# Patient Record
Sex: FEMALE | HISPANIC OR LATINO | Employment: FULL TIME | URBAN - METROPOLITAN AREA
[De-identification: names, ages, dates, MRNs, and addresses within clinical notes are randomized per-mention and may not be internally consistent; named-entity substitution may affect disease eponyms.]

---

## 2017-10-20 ENCOUNTER — TELEPHONE (OUTPATIENT)
Dept: MEDICAL GROUP | Age: 62
End: 2017-10-20

## 2017-10-20 NOTE — TELEPHONE ENCOUNTER
ESTABLISHED PATIENT PRE-VISIT PLANNING     Note: Patient will not be contacted if there is no indication to call.     1.  Reviewed notes from the last few office visits within the medical group: Yes    2.  If any orders were placed at last visit or intended to be done for this visit (i.e. 6 mos follow-up), do we have Results/Consult Notes?        •  Labs - patient coming in for HB   Note: If patient appointment is for lab review and patient did not complete labs,                check with provider if OK to reschedule patient until labs completed.       •  Imaging - Imaging was not ordered at last office visit.       •  Referrals - No referrals were ordered at last office visit.    3. Is this appointment scheduled as a Hospital Follow-Up? No    4.  Immunizations were updated in Epic using WebIZ?: Epic matches WebIZ       •  Web Iz Recommendations: FLU, TD and ZOSTAVAX (Shingles)    5.  Patient is due for the following Health Maintenance Topics:   Health Maintenance Due   Topic Date Due   • IMM ZOSTER VACCINE  11/10/2016   • MAMMOGRAM  03/08/2017   • PAP SMEAR  07/13/2017   • IMM INFLUENZA (1) 09/01/2017           6.  Patient was NOT informed to arrive 15 min prior to their scheduled appointment and bring in their medication bottles.

## 2017-10-21 NOTE — ASSESSMENT & PLAN NOTE
She stopped simvastatin 10 mg. Simvastatin was started on 12/9/16. But she did not take it as instructed due to lack of follow-up and lack of insurance. [facility] on 10[facility]14/16 showed that total cholesterol 239, triglycerides 182, , HDL 57, non-HDL cholesterol 182. Last lipid panel on 10/14/16 refluxed the level of cholesterol without statin treatment.    Follow-up blood tests were ordered on 12/9/16 but patient has not done yet.  Patient agreed to take simvastatin daily.

## 2017-10-21 NOTE — ASSESSMENT & PLAN NOTE
Patient is taking atenolol 25 mg daily. Her blood pressure is not well controlled with current regimen. Patient was told by her pharmacy that atenolol is no longer available. She agrees to switch to alternate medicine, metoprolol. We discussed to take metoprolol XL 25 mg daily.

## 2017-10-21 NOTE — ASSESSMENT & PLAN NOTE
Patient had right breast biopsy on 4/1/16 showed benign rupture cyst with calcification without malignancy or atypical cells. Radiologist recommended to repeat diagnostic mammogram on right breast in 6 months for follow-up. I ordered a diagnostic mammogram of right breast for her, but she has not done yet. I reprinted mammogram ordered for her and strongly advised her to repeat mammogram for close observation.  I also reviewed her previous mammogram and biopsy report with her.

## 2017-10-23 ENCOUNTER — OFFICE VISIT (OUTPATIENT)
Dept: MEDICAL GROUP | Age: 62
End: 2017-10-23

## 2017-10-23 VITALS
HEIGHT: 63 IN | DIASTOLIC BLOOD PRESSURE: 72 MMHG | TEMPERATURE: 97.5 F | HEART RATE: 70 BPM | SYSTOLIC BLOOD PRESSURE: 128 MMHG | OXYGEN SATURATION: 98 % | WEIGHT: 161.4 LBS | BODY MASS INDEX: 28.6 KG/M2

## 2017-10-23 DIAGNOSIS — I10 ESSENTIAL HYPERTENSION: ICD-10-CM

## 2017-10-23 DIAGNOSIS — R92.0 MAMMOGRAPHIC MICROCALCIFICATION: ICD-10-CM

## 2017-10-23 DIAGNOSIS — E78.5 DYSLIPIDEMIA: ICD-10-CM

## 2017-10-23 PROCEDURE — 99214 OFFICE O/P EST MOD 30 MIN: CPT | Performed by: INTERNAL MEDICINE

## 2017-10-23 RX ORDER — SIMVASTATIN 10 MG
10 TABLET ORAL EVERY EVENING
Qty: 90 TAB | Refills: 3 | Status: SHIPPED | OUTPATIENT
Start: 2017-10-23 | End: 2018-06-21

## 2017-10-23 RX ORDER — METOPROLOL SUCCINATE 25 MG/1
25 TABLET, EXTENDED RELEASE ORAL DAILY
Qty: 90 TAB | Refills: 3 | Status: SHIPPED | OUTPATIENT
Start: 2017-10-23 | End: 2018-09-26 | Stop reason: SDUPTHER

## 2017-10-23 ASSESSMENT — PAIN SCALES - GENERAL: PAINLEVEL: 1=MINIMAL PAIN

## 2017-10-23 NOTE — LETTER
[facility]  Janet Sheffield M.D.  [address]  [facility] [address]  Fax: [phone]   Authorization for Release/Disclosure of   Protected Health Information   Name: NILA AJ : 1955 SSN: [id number]   Address: [address] [place] [address] Phone:    [phone] (home)    I authorize the entity listed below to release/disclose the PHI below to:   [facility]/Janet Sheffield M.D. and Janet Sheffield M.D.   Provider or Entity Name:  [facility]   Address   City, State, [facility]   Phone:      Fax:     Reason for request: continuity of care   Information to be released:    [  ] LAST COLONOSCOPY,  including any PATH REPORT and follow-up  [  ] LAST FIT/COLOGUARD RESULT [  ] LAST DEXA  [  ] LAST MAMMOGRAM  [xxxx  ] LAST PAP  [  ] LAST LABS [  ] RETINA EXAM REPORT  [  ] IMMUNIZATION RECORDS  [  ] Release all info      [  ] Check here and initial the line next to each item to release ALL health information INCLUDING  _____ Care and treatment for drug and / or alcohol abuse  _____ HIV testing, infection status, or AIDS  _____ Genetic Testing    DATES OF SERVICE OR TIME PERIOD TO BE DISCLOSED: _____________  I understand and acknowledge that:  * This Authorization may be revoked at any time by you in writing, except if your health information has already been used or disclosed.  * Your health information that will be used or disclosed as a result of you signing this authorization could be re-disclosed by the recipient. If this occurs, your re-disclosed health information may no longer be protected by State or Federal laws.  * You may refuse to sign this Authorization. Your refusal will not affect your ability to obtain treatment.  * This Authorization becomes effective upon signing and will  on (date) __________.      If no date is indicated, this Authorization will  one (1) year from the signature date.    Name: Nila Aj    Signature:   Date:     10/23/2017            PLEASE FAX REQUESTED RECORDS BACK TO: [phone]

## 2017-10-23 NOTE — PROGRESS NOTES
Subjective:   Nila Quispe is a 61 y.o. female here today for evaluation and management of:    Patient speaks Kyrgyz only. This encounter is translated by her daughter.    Mammographic microcalcification on right breast and biopsy on 4/1/16 ruled out malignancy   Patient had right breast biopsy on 4/1/16 showed benign rupture cyst with calcification without malignancy or atypical cells. Radiologist recommended to repeat diagnostic mammogram on right breast in 6 months for follow-up. I ordered a diagnostic mammogram of right breast for her, but she has not done yet. I reprinted mammogram ordered for her and strongly advised her to repeat mammogram for close observation.  I also reviewed her previous mammogram and biopsy report with her.    Essential hypertension  Patient is taking atenolol 25 mg daily. Her blood pressure is not well controlled with current regimen. Patient was told by her pharmacy that atenolol is no longer available. She agrees to switch to alternate medicine, metoprolol. We discussed to take metoprolol XL 25 mg daily.    Dyslipidemia  She stopped simvastatin 10 mg. Simvastatin was started on 12/9/16. But she did not take it as instructed due to lack of follow-up and lack of insurance. [facility]16 showed that total cholesterol 239, triglycerides 182, , HDL 57, non-HDL cholesterol 182. Last lipid panel on 10/14/16 refluxed the level of cholesterol without statin treatment.    Follow-up blood tests were ordered on 12/9/16 but patient has not done yet.  Patient agreed to take simvastatin daily.         Current medicines (including changes today)  Current Outpatient Prescriptions   Medication Sig Dispense Refill   • metoprolol SR (TOPROL XL) 25 MG TABLET SR 24 HR Take 1 Tab by mouth every day. 90 Tab 3   • simvastatin (ZOCOR) 10 MG Tab Take 1 Tab by mouth every evening. 90 Tab 3     No current facility-administered medications for this visit.   "    She  has a past medical history of Hyperlipidemia and Hypertension.    ROS   No chest pain, no shortness of breath, no abdominal pain       Objective:     Blood pressure 128/72, pulse 70, temperature 36.4 °C (97.5 °F), height 1.598 m (5' 2.9\"), weight 73.2 kg (161 lb 6.4 oz), SpO2 98 %, not currently breastfeeding. Body mass index is 28.68 kg/m².   Physical Exam:  General: Alert, oriented and no acute distress.  Eye contact is good, speech goal directed, affect calm  HEENT: conjunctiva non-injected, sclera non-icteric.  Oral mucous membranes pink and moist with no lesions.  Pinna normal.   Lungs: Normal respiratory effort, clear to auscultation bilaterally with good excursion.  CV: regular rate and rhythm. No murmurs.   Abdomen: soft, non distended, nontender, Bowel sound normal.  Ext: no edema, color normal, vascularity normal, temperature normal      Assessment and Plan:   The following treatment plan was discussed     1. Mammographic microcalcification on right breast and biopsy on 4/1/16 ruled out malignancy   - Review previous mammogram report and biopsy report with patient and daughter. Reprinted right breast diagnostic mammogram to her.   - Counseling for importance of follow-up with mammogram.     2. Essential hypertension  - Well-controlled. Continue current regimens. Recheck lab 1-2 weeks before next follow up visit.  - Switched atenolol to metoprolol due to shortage of atenolol.  - Recommend to monitor blood pressure and heart rate at home.   - metoprolol SR (TOPROL XL) 25 MG TABLET SR 24 HR; Take 1 Tab by mouth every day.  Dispense: 90 Tab; Refill: 3  - CBC WITH DIFFERENTIAL; Future  - COMP METABOLIC PANEL; Future    3. Dyslipidemia  - Restart simvastatin 10 mg every evening. Review potential side effects of simvastatin with her.  - Advised to eat low fat, low carbohydrate and high fiber diet as well as do cardio physical exercise regularly.   - simvastatin (ZOCOR) 10 MG Tab; Take 1 Tab by mouth " every evening.  Dispense: 90 Tab; Refill: 3  - COMP METABOLIC PANEL; Future  - LIPID PROFILE; Future    4. Health Maintenance   - Patient stated that she had Pap smear in [facility] clinic 2 years ago and a report was normal. We will request the report for Pap smear. Patient will schedule for MA visit for Flu vaccine.     Followup: Return in about 3 months (around 1/23/2018), or if symptoms worsen or fail to improve, for hypertension, hyperlipidemia, lab review.      Please note that this dictation was created using voice recognition software. I have made every reasonable attempt to correct obvious errors, but I expect that there may have unintended errors in text, spelling, punctuation, or grammar that I did not discover.

## 2018-06-21 ENCOUNTER — OFFICE VISIT (OUTPATIENT)
Dept: MEDICAL GROUP | Age: 63
End: 2018-06-21
Payer: COMMERCIAL

## 2018-06-21 ENCOUNTER — HOSPITAL ENCOUNTER (OUTPATIENT)
Facility: MEDICAL CENTER | Age: 63
End: 2018-06-21
Attending: INTERNAL MEDICINE
Payer: COMMERCIAL

## 2018-06-21 VITALS
SYSTOLIC BLOOD PRESSURE: 122 MMHG | WEIGHT: 167 LBS | TEMPERATURE: 97.4 F | HEIGHT: 62 IN | BODY MASS INDEX: 30.73 KG/M2 | OXYGEN SATURATION: 98 % | DIASTOLIC BLOOD PRESSURE: 68 MMHG | HEART RATE: 61 BPM

## 2018-06-21 DIAGNOSIS — E66.9 OBESITY (BMI 30.0-34.9): ICD-10-CM

## 2018-06-21 DIAGNOSIS — E78.5 DYSLIPIDEMIA: ICD-10-CM

## 2018-06-21 DIAGNOSIS — Z12.31 VISIT FOR SCREENING MAMMOGRAM: ICD-10-CM

## 2018-06-21 DIAGNOSIS — Z01.419 ENCOUNTER FOR GYNECOLOGICAL EXAMINATION: ICD-10-CM

## 2018-06-21 DIAGNOSIS — Z12.4 SCREENING FOR CERVICAL CANCER: ICD-10-CM

## 2018-06-21 DIAGNOSIS — R10.2 PELVIC PAIN IN FEMALE: ICD-10-CM

## 2018-06-21 DIAGNOSIS — I10 ESSENTIAL HYPERTENSION: ICD-10-CM

## 2018-06-21 PROCEDURE — 99396 PREV VISIT EST AGE 40-64: CPT | Mod: 25 | Performed by: INTERNAL MEDICINE

## 2018-06-21 PROCEDURE — 99214 OFFICE O/P EST MOD 30 MIN: CPT | Performed by: INTERNAL MEDICINE

## 2018-06-21 PROCEDURE — 88175 CYTOPATH C/V AUTO FLUID REDO: CPT

## 2018-06-21 PROCEDURE — 87624 HPV HI-RISK TYP POOLED RSLT: CPT

## 2018-06-21 RX ORDER — LOVASTATIN 10 MG/1
10 TABLET ORAL EVERY EVENING
Qty: 90 TAB | Refills: 3 | Status: SHIPPED | OUTPATIENT
Start: 2018-06-21 | End: 2018-09-26 | Stop reason: CLARIF

## 2018-06-21 ASSESSMENT — PATIENT HEALTH QUESTIONNAIRE - PHQ9: CLINICAL INTERPRETATION OF PHQ2 SCORE: 0

## 2018-06-21 NOTE — ASSESSMENT & PLAN NOTE
Patient reported that she stopped taking simvastatin more than 6 months as she had rash from taking simvastatin.  Her cholesterol is not well controlled yet.  Patient agreed to try different statin.  She will try lovastatin 10 mg 1 tablet every evening.  I reviewed the risks and benefits of lovastatin with patient.

## 2018-06-21 NOTE — ASSESSMENT & PLAN NOTE
Patient reported that she has pain on left pelvic for 4 months.  Her symptoms is intermittent. She did not notice any aggravating factor or relieving factor.  Patient reported  that her mom  from cervical cancer at age 56.  Patient had 5 pregnancies in the past and she had 1 miscarriage,  3 C sections and one normal vaginal delivery.  Patient denied stress incontinence.

## 2018-06-21 NOTE — ASSESSMENT & PLAN NOTE
Patient states that she is taking metoprolol XL 25 mg daily.  Her blood pressure is stable and well controlled with current regimen.  She has been taking beta-blocker for many years without complication or side effects, so I am not going to switch to other antihypertensive medication.

## 2018-06-22 DIAGNOSIS — Z12.4 SCREENING FOR CERVICAL CANCER: ICD-10-CM

## 2018-06-22 NOTE — PROGRESS NOTES
SUBJECTIVE: 62 y.o. female for annual routine gynecologic exam  Chief Complaint   Patient presents with   • Gynecologic Exam     PAP   • Pelvic Pain     Left pelvic for 4 months   • Hyperlipidemia   • Hypertension       Obstetric History     No data available      Last Pap: 16  History   Sexual Activity   • Sexual activity: Yes   • Partners: Male   • Birth control/ protection: Post-Menopausal     Sexual history: previously sexually active but currently abstaining   H/O Abnormal Pap: No but has HPV positive in previous Pap on 16  She  reports that she has never smoked. She has never used smokeless tobacco.     HPI:    Pelvic pain in female  Patient reported that she has pain on left pelvic for 4 months.  Her symptoms is intermittent. She did not notice any aggravating factor or relieving factor.  Patient reported  that her mom  from cervical cancer at age 56.  Patient had 5 pregnancies in the past and she had 1 miscarriage,  3 C sections and one normal vaginal delivery.  Patient denied stress incontinence.    Dyslipidemia  Patient reported that she stopped taking simvastatin more than 6 months as she had rash from taking simvastatin.  Her cholesterol is not well controlled yet.  Patient agreed to try different statin.  She will try lovastatin 10 mg 1 tablet every evening.  I reviewed the risks and benefits of lovastatin with patient.    Essential hypertension  Patient states that she is taking metoprolol XL 25 mg daily.  Her blood pressure is stable and well controlled with current regimen.  She has been taking beta-blocker for many years without complication or side effects, so I am not going to switch to other antihypertensive medication.        LMP Date:  (none)   Allergies: Penicillins     ROS:    Reports no menopause symptoms of hot flashes, night sweats, sleep disruption, mood changes.Denies vaginal dryness.   No significant bloating/fluid retention, pelvic pain, or dyspareunia. No vaginal  "discharge   No breast tenderness, mass, nipple discharge, changes in size or contour, or abnormal cyclic discomfort.  No urinary tract symptoms, no incontinence.   No abdominal pain, change in bowel habits, black or bloody stools.    No unusual headaches, no visual changes, menstrual migraines   No prolonged cough. No dyspnea or chest pain on exertion.  No depression, labile mood, anxiety, libido changes, insomnia.  No polydipsia, polyuria, temperature intolerance.  No new/concerning skin lesions, concerns.     Exercise: no regular exercise program  Preventive Care: Counseling for breast cancer screening with mammogram.    Current medicines (including changes today)  Current Outpatient Prescriptions   Medication Sig Dispense Refill   • lovastatin (MEVACOR) 10 MG tablet Take 1 Tab by mouth every evening. 90 Tab 3   • metoprolol SR (TOPROL XL) 25 MG TABLET SR 24 HR Take 1 Tab by mouth every day. 90 Tab 3     No current facility-administered medications for this visit.      She  has a past medical history of Hyperlipidemia and Hypertension.  She  has a past surgical history that includes breast biopsy; us-needle core bx-breast panel (Left); cholecystectomy (2009); and primary c section.     Family History:   Family History   Problem Relation Age of Onset   • Cancer Mother      Cervical Cancer   • Heart Attack Father           OBJECTIVE:   /68   Pulse 61   Temp 36.3 °C (97.4 °F)   Ht 1.57 m (5' 1.8\")   Wt 75.8 kg (167 lb)   SpO2 98%   BMI 30.74 kg/m²   Body mass index is 30.74 kg/m².    HEAD AND NECK:  Ears normal.  Throat, oral cavity and tongue normal.  Neck supple. No adenopathy or masses in the neck or supraclavicular regions.  No carotid bruits. No thyromegaly. NEURO: Cranial nerves are normal. DTR's normal and symmetric.  CHEST:  Clear, good air entry, no wheezes or rales. HEART:  Regular rate and rhythm.  S1 and S2 normal. No edema or JVD. ABDOMEN:  Soft without tenderness, guarding, mass or " organomegaly.  No CVA tenderness or inguinal adenopathy. EXTREMITIES:  Extremities, reflexes and peripheral pulses are normal. SKIN: color normal, vascularity normal, no edema, temperature normal   No rashes or suspicious skin lesions noted.     Breast Exam: Performed with instruction during examination. No axillary lymphadenopathy, no skin changes, no dominant masses. No nipple retraction  Pelvic Exam -  Normal external genitalia with no lesions. Normal vaginal mucosa with normal rugation and no discharge. Cervix with no visible lesions. No cervical motion tenderness. Uterus is normal sized with no masses. No adnexal tenderness or enlargement appreciated. Thin Prep Pap is obtained, vaginal swab is not obtained and specimen(s) sent to lab  Rectal: deferred    <ASSESSMENT and PLAN>  1. Encounter for gynecological examination      Counseling for self breast exam once a month, annual screening mammogram, Kegel's exercise, vitamin D and calcium supplements daily.   2. Screening for cervical cancer  THINPREP PAP WITH HPV    Obtained Pap and sent to lab. Will advise for result.   3. Visit for screening mammogram  MA-SCREEN MAMMO W/CAD-BILAT    Counseled for breast cancer screening.  Ordered for screening mammogram.   4. Dyslipidemia  lovastatin (MEVACOR) 10 MG tablet    Discontinue simvastatin as patient reported having rash from taking it.  Cholesterol is not well controlled yet.  She agreed to try lovastatin 10 mg every evening.  Reviewed potential side effects of lovastatin with patient.   5. Pelvic pain in female  US-GYN-PELVIS TRANSVAGINAL    Discussed possible causes with patient.  Ordered pelvic ultrasound for further evaluation.   6. Essential hypertension      Well-controlled. Continue metoprolol XL 25 mg daily. Advised to check blood pressure and pulse at home. Reprinted blood tests order for patient.   7. Obesity (BMI 30.0-34.9)  Patient identified as having weight management issue.  Appropriate orders and  counseling given.    Counseled for healthy diet and regular physical exercise to lose weight.        Discussed  breast self exam, mammography screening, adequate intake of calcium and vitamin D, diet and exercise, Kegel's exercises   Follow-up in 1 years for next Gyn exam and 3 years for next Pap.   Next office visit for recheck of chronic medical conditions is due in 3 months.

## 2018-06-26 LAB
CYTOLOGY REG CYTOL: ABNORMAL
HPV HR 12 DNA CVX QL NAA+PROBE: POSITIVE
HPV16 DNA SPEC QL NAA+PROBE: NEGATIVE
HPV18 DNA SPEC QL NAA+PROBE: NEGATIVE
SPECIMEN SOURCE: ABNORMAL

## 2018-08-24 ENCOUNTER — HOSPITAL ENCOUNTER (OUTPATIENT)
Dept: LAB | Facility: MEDICAL CENTER | Age: 63
End: 2018-08-24
Attending: INTERNAL MEDICINE
Payer: COMMERCIAL

## 2018-08-24 DIAGNOSIS — E78.5 DYSLIPIDEMIA: ICD-10-CM

## 2018-08-24 DIAGNOSIS — I10 ESSENTIAL HYPERTENSION: ICD-10-CM

## 2018-08-24 LAB
ALBUMIN SERPL BCP-MCNC: 3.8 G/DL (ref 3.2–4.9)
ALBUMIN/GLOB SERPL: 1.1 G/DL
ALP SERPL-CCNC: 117 U/L (ref 30–99)
ALT SERPL-CCNC: 19 U/L (ref 2–50)
ANION GAP SERPL CALC-SCNC: 6 MMOL/L (ref 0–11.9)
AST SERPL-CCNC: 21 U/L (ref 12–45)
BASOPHILS # BLD AUTO: 0.7 % (ref 0–1.8)
BASOPHILS # BLD: 0.06 K/UL (ref 0–0.12)
BILIRUB SERPL-MCNC: 0.8 MG/DL (ref 0.1–1.5)
BUN SERPL-MCNC: 17 MG/DL (ref 8–22)
CALCIUM SERPL-MCNC: 9.1 MG/DL (ref 8.5–10.5)
CHLORIDE SERPL-SCNC: 105 MMOL/L (ref 96–112)
CHOLEST SERPL-MCNC: 221 MG/DL (ref 100–199)
CO2 SERPL-SCNC: 27 MMOL/L (ref 20–33)
CREAT SERPL-MCNC: 0.73 MG/DL (ref 0.5–1.4)
EOSINOPHIL # BLD AUTO: 0.17 K/UL (ref 0–0.51)
EOSINOPHIL NFR BLD: 2.1 % (ref 0–6.9)
ERYTHROCYTE [DISTWIDTH] IN BLOOD BY AUTOMATED COUNT: 44.6 FL (ref 35.9–50)
GLOBULIN SER CALC-MCNC: 3.6 G/DL (ref 1.9–3.5)
GLUCOSE SERPL-MCNC: 91 MG/DL (ref 65–99)
HCT VFR BLD AUTO: 40.5 % (ref 37–47)
HDLC SERPL-MCNC: 53 MG/DL
HGB BLD-MCNC: 13.6 G/DL (ref 12–16)
IMM GRANULOCYTES # BLD AUTO: 0.04 K/UL (ref 0–0.11)
IMM GRANULOCYTES NFR BLD AUTO: 0.5 % (ref 0–0.9)
LDLC SERPL CALC-MCNC: 132 MG/DL
LYMPHOCYTES # BLD AUTO: 3.34 K/UL (ref 1–4.8)
LYMPHOCYTES NFR BLD: 41.2 % (ref 22–41)
MCH RBC QN AUTO: 31.3 PG (ref 27–33)
MCHC RBC AUTO-ENTMCNC: 33.6 G/DL (ref 33.6–35)
MCV RBC AUTO: 93.1 FL (ref 81.4–97.8)
MONOCYTES # BLD AUTO: 0.62 K/UL (ref 0–0.85)
MONOCYTES NFR BLD AUTO: 7.6 % (ref 0–13.4)
NEUTROPHILS # BLD AUTO: 3.88 K/UL (ref 2–7.15)
NEUTROPHILS NFR BLD: 47.9 % (ref 44–72)
NRBC # BLD AUTO: 0 K/UL
NRBC BLD-RTO: 0 /100 WBC
PLATELET # BLD AUTO: 265 K/UL (ref 164–446)
PMV BLD AUTO: 10.4 FL (ref 9–12.9)
POTASSIUM SERPL-SCNC: 4.1 MMOL/L (ref 3.6–5.5)
PROT SERPL-MCNC: 7.4 G/DL (ref 6–8.2)
RBC # BLD AUTO: 4.35 M/UL (ref 4.2–5.4)
SODIUM SERPL-SCNC: 138 MMOL/L (ref 135–145)
TRIGL SERPL-MCNC: 182 MG/DL (ref 0–149)
WBC # BLD AUTO: 8.1 K/UL (ref 4.8–10.8)

## 2018-08-24 PROCEDURE — 80061 LIPID PANEL: CPT

## 2018-08-24 PROCEDURE — 80053 COMPREHEN METABOLIC PANEL: CPT

## 2018-08-24 PROCEDURE — 36415 COLL VENOUS BLD VENIPUNCTURE: CPT

## 2018-08-24 PROCEDURE — 85025 COMPLETE CBC W/AUTO DIFF WBC: CPT

## 2018-08-28 ENCOUNTER — HOSPITAL ENCOUNTER (OUTPATIENT)
Dept: RADIOLOGY | Facility: MEDICAL CENTER | Age: 63
End: 2018-08-28
Attending: INTERNAL MEDICINE
Payer: COMMERCIAL

## 2018-08-28 ENCOUNTER — TELEPHONE (OUTPATIENT)
Dept: MEDICAL GROUP | Age: 63
End: 2018-08-28

## 2018-08-28 DIAGNOSIS — R10.2 PELVIC PAIN IN FEMALE: ICD-10-CM

## 2018-08-28 DIAGNOSIS — Z12.31 VISIT FOR SCREENING MAMMOGRAM: ICD-10-CM

## 2018-08-28 PROCEDURE — 76830 TRANSVAGINAL US NON-OB: CPT

## 2018-08-28 PROCEDURE — 77067 SCR MAMMO BI INCL CAD: CPT

## 2018-08-28 NOTE — LETTER
August 29, 2018        Nila Rodríguez Brittaney  [address] [facility] [place] [address]        Dear Nila,    We've been trying to get in contact with you in regards to your recent results of your pelvic ultrasound. Everything came out normal. No tumor was found.       If you have any questions regarding your results, please give us a call at [phone].      Sincerely,        [facility]

## 2018-08-28 NOTE — TELEPHONE ENCOUNTER
----- Message from Janet Sheffield M.D. sent at 8/28/2018  9:14 AM PDT -----  Please call to inform patient that her recent pelvic ultrasound is normal and ovaries are not visualized in ultrasound.  No tumor is found on ultrasound.    Thanks!  Janet Sheffield M.D.

## 2018-08-28 NOTE — TELEPHONE ENCOUNTER
Phone Number Called: [phone] (home)       Message: LVM for patient to discuss note below.     Left Message for patient to call back: yes

## 2018-08-29 ENCOUNTER — TELEPHONE (OUTPATIENT)
Dept: MEDICAL GROUP | Age: 63
End: 2018-08-29

## 2018-08-29 NOTE — TELEPHONE ENCOUNTER
----- Message from Janet Sheffield M.D. sent at 8/29/2018 12:29 PM PDT -----  Normal mammogram  Recommend monthly self examinations  Followup mammogram in one year    Janet Sheffield M.D.

## 2018-08-29 NOTE — TELEPHONE ENCOUNTER
Phone Number Called: [phone] (home)       Message: LVM for patient to discuss note below.     Left Message for patient to call back: yes

## 2018-08-29 NOTE — LETTER
August 30, 2018        Nila Rodríguez Brittaney  [address] [facility] [address]        Dear Nila    We have been trying to contact you in regards to your results  Your recent mammogram came out normal.  It is still recommended to perform a monthly self breast exam and to do a follow up mammogram in 1 year.    If you have any questions, please contact us at [phone].        Sincerely,    Carol Romero

## 2018-08-29 NOTE — TELEPHONE ENCOUNTER
Phone Number Called: [phone] (home)       Message: LVM for patient.    Left Message for patient to call back: yes

## 2018-08-30 NOTE — TELEPHONE ENCOUNTER
Phone Number Called: [phone] (home)       Message: LVM for patient. Letter being sent.     Left Message for patient to call back: yes

## 2018-09-26 ENCOUNTER — OFFICE VISIT (OUTPATIENT)
Dept: MEDICAL GROUP | Age: 63
End: 2018-09-26
Payer: COMMERCIAL

## 2018-09-26 VITALS
OXYGEN SATURATION: 99 % | TEMPERATURE: 97.4 F | DIASTOLIC BLOOD PRESSURE: 62 MMHG | BODY MASS INDEX: 30.91 KG/M2 | WEIGHT: 168 LBS | SYSTOLIC BLOOD PRESSURE: 118 MMHG | HEART RATE: 51 BPM | HEIGHT: 62 IN

## 2018-09-26 DIAGNOSIS — R10.2 PELVIC PAIN IN FEMALE: ICD-10-CM

## 2018-09-26 DIAGNOSIS — Z23 NEED FOR VACCINATION: ICD-10-CM

## 2018-09-26 DIAGNOSIS — E78.5 DYSLIPIDEMIA: ICD-10-CM

## 2018-09-26 DIAGNOSIS — I10 ESSENTIAL HYPERTENSION: ICD-10-CM

## 2018-09-26 PROCEDURE — 90471 IMMUNIZATION ADMIN: CPT | Performed by: INTERNAL MEDICINE

## 2018-09-26 PROCEDURE — 99214 OFFICE O/P EST MOD 30 MIN: CPT | Mod: 25 | Performed by: INTERNAL MEDICINE

## 2018-09-26 PROCEDURE — 90686 IIV4 VACC NO PRSV 0.5 ML IM: CPT | Performed by: INTERNAL MEDICINE

## 2018-09-26 RX ORDER — METOPROLOL SUCCINATE 25 MG/1
25 TABLET, EXTENDED RELEASE ORAL DAILY
Qty: 90 TAB | Refills: 3 | Status: SHIPPED | OUTPATIENT
Start: 2018-09-26 | End: 2019-10-01 | Stop reason: SDUPTHER

## 2018-09-26 RX ORDER — ATORVASTATIN CALCIUM 20 MG/1
20 TABLET, FILM COATED ORAL EVERY EVENING
Qty: 90 TAB | Refills: 3 | Status: SHIPPED | OUTPATIENT
Start: 2018-09-26 | End: 2019-10-01 | Stop reason: SDUPTHER

## 2018-09-26 NOTE — ASSESSMENT & PLAN NOTE
Patient reported that her left-sided pelvic pain and lower abdominal pain resolved.  Her pelvic ultrasound was done on 18 and I reviewed ultrasound report with her in clinic today.  Ultrasound report showed that uterus is unremarkable, ovaries are not visualized, no evidence of adnexal mass, no free fluid.  I provided reassurance to patient for having normal pelvic ultrasound report.  Patient also stated that her pelvic pain resolved.  Patient reported that her mom  from cervical cancer at age 56.  Patient has Pap smear on 18 showed that normal Pap with positive HPV.  Patient is advised to repeat Pap smear within a year for follow-up.  Patient agreed with the plan.

## 2018-09-26 NOTE — PROGRESS NOTES
Subjective:   Amos Quispe is a 62 y.o. female here today for evaluation and management of:      Pelvic pain in female  Patient reported that her left-sided pelvic pain and lower abdominal pain resolved.  Her pelvic ultrasound was done on 18 and I reviewed ultrasound report with her in clinic today.  Ultrasound report showed that uterus is unremarkable, ovaries are not visualized, no evidence of adnexal mass, no free fluid.  I provided reassurance to patient for having normal pelvic ultrasound report.  Patient also stated that her pelvic pain resolved.  Patient reported that her mom  from cervical cancer at age 56.  Patient has Pap smear on 18 showed that normal Pap with positive HPV.  Patient is advised to repeat Pap smear within a year for follow-up.  Patient agreed with the plan.    Essential hypertension  Patient is taking metoprolol XL 25 mg daily.  She did take metoprolol for palpitation and blood pressure control.  Her blood pressure and heart rate improved with metoprolol.  She denies side effects from taking metoprolol.  She requested to refill medication today.    Dyslipidemia  Patient reported having rash from taking simvastatin.  She stopped taking simvastatin last year.  Given her high cholesterol, we restarted lovastatin 10 mg on 18.  After 3 months trial of lovastatin, her cholesterol remains high.  She denies side effects from taking lovastatin.  Given her uncontrolled high cholesterol, we discussed to switch lovastatin to atorvastatin 20 mg daily.  She agreed to try atorvastatin.  I reviewed the risks and benefits of atorvastatin with patient.  I reviewed blood test result with her in clinic today.  She has slightly elevated alkaline phosphatase but the rest of liver enzymes are normal.  She denied abdominal pain or jaundice or anorexia.    Results for AMOS RAMIRES (MRN [id number]) as of 2018 11:59   Ref. Range 2018 06:49   Cholesterol,Tot  "Latest Ref Range: 100 - 199 mg/dL 221 (H)   Triglycerides Latest Ref Range: 0 - 149 mg/dL 182 (H)   HDL Latest Ref Range: >=40 mg/dL 53   LDL Latest Ref Range: <100 mg/dL 132 (H)          Current medicines (including changes today)  Current Outpatient Prescriptions   Medication Sig Dispense Refill   • atorvastatin (LIPITOR) 20 MG Tab Take 1 Tab by mouth every evening. 90 Tab 3   • metoprolol SR (TOPROL XL) 25 MG TABLET SR 24 HR Take 1 Tab by mouth every day. 90 Tab 3     No current facility-administered medications for this visit.      She  has a past medical history of Hyperlipidemia and Hypertension.    ROS   No chest pain, no shortness of breath, no abdominal pain       Objective:     Blood pressure 118/62, pulse (!) 51, temperature 36.3 °C (97.4 °F), temperature source Temporal, height 1.57 m (5' 1.8\"), weight 76.2 kg (168 lb), SpO2 99 %, not currently breastfeeding. Body mass index is 30.93 kg/m².   Physical Exam:  General: Alert, oriented and no acute distress.  Eye contact is good, speech goal directed, affect calm  HEENT: conjunctiva non-injected, sclera non-icteric.  Oral mucous membranes pink and moist with no lesions.  Pinna normal.   Lungs: Normal respiratory effort, clear to auscultation bilaterally with good excursion.  CV: regular rate and rhythm. No murmurs.  Abdomen: soft, non distended, nontender, Bowel sound normal.  Ext: no edema, color normal, vascularity normal, temperature normal        Assessment and Plan:   The following treatment plan was discussed     1. Essential hypertension  - Well-controlled. Continue current regimens, metoprolol XL 25 mg daily. Recheck lab 1-2 weeks before next follow up visit.  - Reviewed the risks and benefits as well as potential side effects of medications with patient.  Patient denies side effects from taking metoprolol or denied fatigue or tiredness from taking metoprolol.  - Discussed to eat low-sodium diet and encouraged to do regular physical exercise.  - " Recommend to monitor blood pressure and heart rate at home.  - metoprolol SR (TOPROL XL) 25 MG TABLET SR 24 HR; Take 1 Tab by mouth every day.  Dispense: 90 Tab; Refill: 3  - COMP METABOLIC PANEL; Future    2. Dyslipidemia  - Not well-controlled with lovastatin 10 mg.  Discontinue lovastatin.  Start atorvastatin 20 mg 1 tab every evening.  Recheck lab 1-2 weeks before next follow up visit.  - Reviewed the risks and benefits of treatment and potential side effects of medication.  - Advised to eat low fat, low carbohydrate and high fiber diet as well as do cardio physical exercise regularly.  - atorvastatin (LIPITOR) 20 MG Tab; Take 1 Tab by mouth every evening.  Dispense: 90 Tab; Refill: 3  - COMP METABOLIC PANEL; Future  - LIPID PROFILE; Future    3. Pelvic pain in female  - Pain resolved.  Pelvic ultrasound on 8/28/18 was normal.  Provide reassurance.  Patient has HPV positive with normal Pap smear on 6/21/18.  She is advised to repeat Pap smear in 1 year for follow-up.    4. Need for vaccination  - Influenza vaccine was given today after reviewing risks and benefits as well as side effects of vaccine.  - Influenza Vaccine Quad Injection >3Y (PF)        Followup: Return in about 6 months (around 3/26/2019) for Hyperlipidemia, Hypertension, Lab review.      Please note that this dictation was created using voice recognition software. I have made every reasonable attempt to correct obvious errors, but I expect that there may have unintended errors in text, spelling, punctuation, or grammar that I did not discover.

## 2018-09-26 NOTE — ASSESSMENT & PLAN NOTE
Patient is taking metoprolol XL 25 mg daily.  She did take metoprolol for palpitation and blood pressure control.  Her blood pressure and heart rate improved with metoprolol.  She denies side effects from taking metoprolol.  She requested to refill medication today.

## 2018-09-26 NOTE — ASSESSMENT & PLAN NOTE
Patient reported having rash from taking simvastatin.  She stopped taking simvastatin last year.  Given her high cholesterol, we restarted lovastatin 10 mg on 6/21/18.  After 3 months trial of lovastatin, her cholesterol remains high.  She denies side effects from taking lovastatin.  Given her uncontrolled high cholesterol, we discussed to switch lovastatin to atorvastatin 20 mg daily.  She agreed to try atorvastatin.  I reviewed the risks and benefits of atorvastatin with patient.  I reviewed blood test result with her in clinic today.  She has slightly elevated alkaline phosphatase but the rest of liver enzymes are normal.  She denied abdominal pain or jaundice or anorexia.    Results for AMOS RAMIRES (MRN [id number]) as of 9/26/2018 11:59   Ref. Range 8/24/2018 06:49   Cholesterol,Tot Latest Ref Range: 100 - 199 mg/dL 221 (H)   Triglycerides Latest Ref Range: 0 - 149 mg/dL 182 (H)   HDL Latest Ref Range: >=40 mg/dL 53   LDL Latest Ref Range: <100 mg/dL 132 (H)

## 2019-02-26 ENCOUNTER — TELEPHONE (OUTPATIENT)
Dept: MEDICAL GROUP | Age: 64
End: 2019-02-26

## 2019-02-26 NOTE — TELEPHONE ENCOUNTER
Patient stated medication has been  and would like her future pharmacy to be   [facility]/pharmacy #[id number] - [place], [place] - [id number] [facility]  [place] [address]  Phone: [phone] Fax: [phone]

## 2019-02-26 NOTE — TELEPHONE ENCOUNTER
[place] SPEAKER NEEDED        1. Caller Name: Nila Quispe                                           Call Back Number: [phone] (home)         Patient approves a detailed voicemail message: yes      Patient called requesting refill of medication Metoprolol, she states that [facility] [address] has closed.      Need to call patient to see which pharmacy she would like

## 2019-10-01 ENCOUNTER — HOSPITAL ENCOUNTER (OUTPATIENT)
Facility: MEDICAL CENTER | Age: 64
End: 2019-10-01
Attending: INTERNAL MEDICINE
Payer: COMMERCIAL

## 2019-10-01 ENCOUNTER — OFFICE VISIT (OUTPATIENT)
Dept: MEDICAL GROUP | Age: 64
End: 2019-10-01
Payer: COMMERCIAL

## 2019-10-01 VITALS
HEIGHT: 63 IN | BODY MASS INDEX: 31.82 KG/M2 | SYSTOLIC BLOOD PRESSURE: 110 MMHG | TEMPERATURE: 98 F | DIASTOLIC BLOOD PRESSURE: 64 MMHG | WEIGHT: 179.6 LBS | HEART RATE: 54 BPM | OXYGEN SATURATION: 97 %

## 2019-10-01 DIAGNOSIS — Z11.3 SCREEN FOR STD (SEXUALLY TRANSMITTED DISEASE): ICD-10-CM

## 2019-10-01 DIAGNOSIS — Z01.419 ENCOUNTER FOR GYNECOLOGICAL EXAMINATION: ICD-10-CM

## 2019-10-01 DIAGNOSIS — E66.9 OBESITY (BMI 30.0-34.9): ICD-10-CM

## 2019-10-01 DIAGNOSIS — E78.5 DYSLIPIDEMIA: ICD-10-CM

## 2019-10-01 DIAGNOSIS — Z12.4 SCREENING FOR CERVICAL CANCER: ICD-10-CM

## 2019-10-01 DIAGNOSIS — Z11.59 NEED FOR HEPATITIS C SCREENING TEST: ICD-10-CM

## 2019-10-01 DIAGNOSIS — R10.13 EPIGASTRIC PAIN: ICD-10-CM

## 2019-10-01 DIAGNOSIS — I10 ESSENTIAL HYPERTENSION: ICD-10-CM

## 2019-10-01 DIAGNOSIS — Z12.31 VISIT FOR SCREENING MAMMOGRAM: ICD-10-CM

## 2019-10-01 PROCEDURE — 87510 GARDNER VAG DNA DIR PROBE: CPT

## 2019-10-01 PROCEDURE — 87624 HPV HI-RISK TYP POOLED RSLT: CPT

## 2019-10-01 PROCEDURE — 87480 CANDIDA DNA DIR PROBE: CPT

## 2019-10-01 PROCEDURE — 87491 CHLMYD TRACH DNA AMP PROBE: CPT

## 2019-10-01 PROCEDURE — 99396 PREV VISIT EST AGE 40-64: CPT | Mod: 25 | Performed by: INTERNAL MEDICINE

## 2019-10-01 PROCEDURE — 88175 CYTOPATH C/V AUTO FLUID REDO: CPT

## 2019-10-01 PROCEDURE — 87660 TRICHOMONAS VAGIN DIR PROBE: CPT

## 2019-10-01 PROCEDURE — 99214 OFFICE O/P EST MOD 30 MIN: CPT | Performed by: INTERNAL MEDICINE

## 2019-10-01 PROCEDURE — 87591 N.GONORRHOEAE DNA AMP PROB: CPT

## 2019-10-01 RX ORDER — METOPROLOL SUCCINATE 25 MG/1
25 TABLET, EXTENDED RELEASE ORAL DAILY
Qty: 90 TAB | Refills: 3 | Status: SHIPPED | OUTPATIENT
Start: 2019-10-01 | End: 2020-03-13 | Stop reason: SDUPTHER

## 2019-10-01 RX ORDER — RANITIDINE 150 MG/1
150 TABLET ORAL 2 TIMES DAILY
Qty: 60 TAB | Refills: 11 | Status: SHIPPED | OUTPATIENT
Start: 2019-10-01 | End: 2019-10-29

## 2019-10-01 RX ORDER — ATORVASTATIN CALCIUM 20 MG/1
20 TABLET, FILM COATED ORAL EVERY EVENING
Qty: 90 TAB | Refills: 3 | Status: SHIPPED | OUTPATIENT
Start: 2019-10-01 | End: 2020-03-13 | Stop reason: SDUPTHER

## 2019-10-01 ASSESSMENT — PAIN SCALES - GENERAL: PAINLEVEL: 5=MODERATE PAIN

## 2019-10-01 ASSESSMENT — PATIENT HEALTH QUESTIONNAIRE - PHQ9: CLINICAL INTERPRETATION OF PHQ2 SCORE: 0

## 2019-10-01 NOTE — ASSESSMENT & PLAN NOTE
This is a new problem.  Patient reported that she has epigastric discomfort and pain for 2 weeks.  She also noticed loose to in the last 2 days.  She denies blood in stool or black tarry stool.  She denies nausea or vomiting or fever or chills or anorexia or jaundice or unintentional weight loss.  Patient reported that she tried Prilosec last year and she could not tolerate Prilosec due to itchy rash.

## 2019-10-01 NOTE — PROGRESS NOTES
SUBJECTIVE: 63 y.o. female for annual routine gynecologic exam  Chief Complaint   Patient presents with   • Gynecologic Exam     pap   • Hypertension   • Hyperlipidemia   • Epigastric Pain       OB History   No data available      Last Pap: 6/21/18  Social History     Substance and Sexual Activity   Sexual Activity Yes   • Partners: Male   • Birth control/protection: Post-Menopausal     Sexual history: currently sexually active, single partner, history of chlamydia   H/O Abnormal Pap yes.  Patient has normal Pap smear with positive HPV on 6/21/2018.  She  reports that she has never smoked. She has never used smokeless tobacco.        Allergies: Penicillins     ROS:    Reports none menopause symptoms of hot flashes, night sweats, sleep disruption, mood changes.Denies vaginal dryness.   No significant bloating/fluid retention, pelvic pain, or dyspareunia. No vaginal discharge   No breast tenderness, mass, nipple discharge, changes in size or contour, or abnormal cyclic discomfort.  No urinary tract symptoms, no incontinence.   No abdominal pain, change in bowel habits, black or bloody stools.    No unusual headaches, no visual changes, menstrual migraines   No prolonged cough. No dyspnea or chest pain on exertion.  No depression, labile mood, anxiety, libido changes, insomnia.  No polydipsia, polyuria, temperature intolerance.  No new/concerning skin lesions, concerns.     Exercise: no regular exercise program  Preventive Care: Patient is due for influenza vaccine and shingles vaccine.  She is advised to receive influenza vaccine and shingles vaccine in the local pharmacy due to clinic shortage.  Patient has normal screening mammogram on 8/28/2018.  She will repeat mammogram this year.  Screening mammogram was placed today.    Current medicines (including changes today)  Current Outpatient Medications   Medication Sig Dispense Refill   • atorvastatin (LIPITOR) 20 MG Tab Take 1 Tab by mouth every evening. 90 Tab 3   •  "metoprolol SR (TOPROL XL) 25 MG TABLET SR 24 HR Take 1 Tab by mouth every day. 90 Tab 3   • raNITidine (ZANTAC) 150 MG Tab Take 1 Tab by mouth 2 times a day. 60 Tab 11     No current facility-administered medications for this visit.      She  has a past medical history of Hyperlipidemia and Hypertension.  She  has a past surgical history that includes breast biopsy; us-needle core bx-breast panel (Left); cholecystectomy (2009); and primary c section.     Family History:   Family History   Problem Relation Age of Onset   • Cancer Mother         Cervical Cancer   • Heart Attack Father           OBJECTIVE:   /64 (BP Location: Left arm, Patient Position: Sitting, BP Cuff Size: Adult)   Pulse (!) 54   Temp 36.7 °C (98 °F) (Temporal)   Ht 1.6 m (5' 3\")   Wt 81.5 kg (179 lb 9.6 oz)   SpO2 97%   BMI 31.81 kg/m²   Body mass index is 31.81 kg/m².    HEAD AND NECK:  Ears normal.  Throat, oral cavity and tongue normal.  Neck supple. No adenopathy or masses in the neck or supraclavicular regions.  No carotid bruits. No thyromegaly. NEURO: Cranial nerves are normal. DTR's normal and symmetric.  CHEST:  Clear, good air entry, no wheezes or rales. HEART:  Regular rate and rhythm.  S1 and S2 normal. No edema or JVD. ABDOMEN:  Soft without tenderness, guarding, mass or organomegaly.  No CVA tenderness or inguinal adenopathy. EXTREMITIES:  Extremities, reflexes and peripheral pulses are normal. SKIN: color normal, vascularity normal, no edema, temperature normal   No rashes or suspicious skin lesions noted.     Breast Exam: Performed with instruction during examination. No axillary lymphadenopathy, no skin changes, no dominant masses. No nipple retraction  Pelvic Exam -  Normal external genitalia with no lesions. Normal vaginal mucosa with normal rugation and no discharge. Cervix with no visible lesions. No cervical motion tenderness. Uterus is normal sized with no masses. No adnexal tenderness or enlargement appreciated. " Thin Prep Pap is obtained, vaginal swab is obtained and specimen(s) sent to lab  Rectal: deferred    <ASSESSMENT and PLAN>  1. Encounter for gynecological examination      Counseled for healthy diet, regular physical exercise, vitamin D and calcium supplement, Kegel exercise, self breast exam once a month and annual screening mammogram.     2. Screening for cervical cancer  THINPREP PAP WITH HPV    Obtained Pap smear and sent to lab.  Will advise for result.     3. Visit for screening mammogram  MA-SCREEN MAMMO W/CAD-BILAT    Counseled for breast cancer screening.  Patient agreed to do screening mammogram.  Ordered screening mammogram.     4. Dyslipidemia  atorvastatin (LIPITOR) 20 MG Tab    Comp Metabolic Panel    Lipid Profile    Not well controlled yet. Continue atorvastatin 20 mg every evening.  Recheck lab in 4 weeks and follow-up in clinic.  Advised to eat low fat, low carbohydrate and high fiber diet as well as do cardio physical exercise regularly.     5. Essential hypertension  metoprolol SR (TOPROL XL) 25 MG TABLET SR 24 HR    CBC WITH DIFFERENTIAL    Comp Metabolic Panel    Well-controlled.  Continue metoprolol XL 25 mg daily. Recommend to monitor blood pressure and heart rate at home.     6. Epigastric pain  raNITidine (ZANTAC) 150 MG Tab    I discussed possible causes with patient including, but not limited to gastritis or gastroenteritis or GERD. Will try ranitidine 150 mg twice a day for 1 to 2 weeks.  Advised to eat salt and bland diet and avoid spicy foods and acidic food.  Advised patient to avoid over-the-counter NSAIDs.  She is advised to return back to clinic sooner if she has any worsening symptoms.  Patient is advised to seek urgent medical attention or go to ER if she has worsening abdominal pain, or black tarry stool or bloody bowel movement or intractable nausea and vomiting.  Otherwise, she will try ranitidine as prescribed and add probiotic once a day for 4-week and return back to clinic  in 4 weeks for follow-up.     7. Need for hepatitis C screening test  HEP C VIRUS ANTIBODY    Counseled for hepatitis C screening.  Patient agreed to do the hepatitis C screening blood test.     8. Obesity (BMI 30.0-34.9)  Patient identified as having weight management issue.  Appropriate orders and counseling given.    Counseled for healthy diet and regular physical exercise to lose weight.      9. Screen for STD (sexually transmitted disease)  VAGINAL PATHOGENS DNA PANEL    Chlamydia/GC PCR Urine Or Swab    Patient reported that she has history of chlamydia and was treated 8 years ago.  She has one sexual partner, her  for 40 years.  She denies any symptoms currently.  However, patient has HPV positive and previous Pap smear on 6/21/2018. Obtained vaginal swab for STD screening.         Discussed  breast self exam, mammography screening, menopause, adequate intake of calcium and vitamin D, diet and exercise, Kegel's exercises   Follow-up in 1 years for next Gyn exam and 3 years for next Pap.   Next office visit for recheck of chronic medical conditions is due in 1 month.

## 2019-10-01 NOTE — ASSESSMENT & PLAN NOTE
Patient is taking metoprolol XL 25 mg daily for palpitation and blood pressure control.  She denies side effects from taking it.  Her blood pressure is stable and well controlled.  Patient request to refill medication today.

## 2019-10-01 NOTE — ASSESSMENT & PLAN NOTE
Patient is taking atorvastatin 20 mg every evening.  She denies side effects from taking it.  She request to refill medication today.  Previous lipid panel showed that her cholesterol is not well controlled on 8/24/2018.  So we discussed to switch simvastatin to atorvastatin.  Patient did not complete blood test due to lack of insurance.  She request to reorder blood test for cholesterol follow-up.  I reviewed previous blood test result with her in clinic today.    Results for BRAD AJ AMOS COLLINS (MRN [id number]) as of 10/1/2019 11:54   Ref. Range 8/24/2018 06:49   Cholesterol,Tot Latest Ref Range: 100 - 199 mg/dL 221 (H)   Triglycerides Latest Ref Range: 0 - 149 mg/dL 182 (H)   HDL Latest Ref Range: >=40 mg/dL 53   LDL Latest Ref Range: <100 mg/dL 132 (H)

## 2019-10-02 LAB
CANDIDA DNA VAG QL PROBE+SIG AMP: NEGATIVE
CYTOLOGY REG CYTOL: ABNORMAL
G VAGINALIS DNA VAG QL PROBE+SIG AMP: NEGATIVE
HPV HR 12 DNA CVX QL NAA+PROBE: POSITIVE
HPV16 DNA SPEC QL NAA+PROBE: NEGATIVE
HPV18 DNA SPEC QL NAA+PROBE: NEGATIVE
SPECIMEN SOURCE: ABNORMAL
T VAGINALIS DNA VAG QL PROBE+SIG AMP: NEGATIVE

## 2019-10-03 DIAGNOSIS — R87.810 CERVICAL HIGH RISK HPV (HUMAN PAPILLOMAVIRUS) TEST POSITIVE: ICD-10-CM

## 2019-10-03 LAB
C TRACH DNA SPEC QL NAA+PROBE: NEGATIVE
N GONORRHOEA DNA SPEC QL NAA+PROBE: NEGATIVE
SPECIMEN SOURCE: NORMAL

## 2019-10-04 NOTE — PROGRESS NOTES
Patient has positive high risk HPV twice in consecutive Pap smear.  The referral to gynecologist was placed for further evaluation and possible colposcopy.    Janet Sheffield M.D.

## 2019-10-17 ENCOUNTER — TELEPHONE (OUTPATIENT)
Dept: MEDICAL GROUP | Facility: MEDICAL CENTER | Age: 64
End: 2019-10-17

## 2019-10-17 LAB
ALBUMIN SERPL-MCNC: 3.7 G/DL (ref 3.6–4.8)
ALBUMIN/GLOB SERPL: 1.1 {RATIO} (ref 1.2–2.2)
ALP SERPL-CCNC: 116 IU/L (ref 39–117)
ALT SERPL-CCNC: 10 IU/L (ref 0–32)
AST SERPL-CCNC: 20 IU/L (ref 0–40)
BASOPHILS # BLD AUTO: 0 X10E3/UL (ref 0–0.2)
BASOPHILS NFR BLD AUTO: 0 %
BILIRUB SERPL-MCNC: 0.5 MG/DL (ref 0–1.2)
BUN SERPL-MCNC: 12 MG/DL (ref 8–27)
BUN/CREAT SERPL: 19 (ref 12–28)
CALCIUM SERPL-MCNC: 9 MG/DL (ref 8.7–10.3)
CHLORIDE SERPL-SCNC: 107 MMOL/L (ref 96–106)
CHOLEST SERPL-MCNC: 190 MG/DL (ref 100–199)
CO2 SERPL-SCNC: 22 MMOL/L (ref 20–29)
CREAT SERPL-MCNC: 0.64 MG/DL (ref 0.57–1)
EOSINOPHIL # BLD AUTO: 0.1 X10E3/UL (ref 0–0.4)
EOSINOPHIL NFR BLD AUTO: 1 %
ERYTHROCYTE [DISTWIDTH] IN BLOOD BY AUTOMATED COUNT: 14.1 % (ref 12.3–15.4)
GLOBULIN SER CALC-MCNC: 3.3 G/DL (ref 1.5–4.5)
GLUCOSE SERPL-MCNC: 90 MG/DL (ref 65–99)
HCT VFR BLD AUTO: 40.1 % (ref 34–46.6)
HCV AB S/CO SERPL IA: 0.1 S/CO RATIO (ref 0–0.9)
HDLC SERPL-MCNC: 48 MG/DL
HGB BLD-MCNC: 13.3 G/DL (ref 11.1–15.9)
IMM GRANULOCYTES # BLD AUTO: 0 X10E3/UL (ref 0–0.1)
IMM GRANULOCYTES NFR BLD AUTO: 0 %
IMMATURE CELLS  115398: NORMAL
LABORATORY COMMENT REPORT: ABNORMAL
LDLC SERPL CALC-MCNC: 110 MG/DL (ref 0–99)
LYMPHOCYTES # BLD AUTO: 2.4 X10E3/UL (ref 0.7–3.1)
LYMPHOCYTES NFR BLD AUTO: 32 %
MCH RBC QN AUTO: 30.1 PG (ref 26.6–33)
MCHC RBC AUTO-ENTMCNC: 33.2 G/DL (ref 31.5–35.7)
MCV RBC AUTO: 91 FL (ref 79–97)
MONOCYTES # BLD AUTO: 0.7 X10E3/UL (ref 0.1–0.9)
MONOCYTES NFR BLD AUTO: 9 %
MORPHOLOGY BLD-IMP: NORMAL
NEUTROPHILS # BLD AUTO: 4.3 X10E3/UL (ref 1.4–7)
NEUTROPHILS NFR BLD AUTO: 58 %
NRBC BLD AUTO-RTO: NORMAL %
PLATELET # BLD AUTO: 280 X10E3/UL (ref 150–450)
POTASSIUM SERPL-SCNC: 4.4 MMOL/L (ref 3.5–5.2)
PROT SERPL-MCNC: 7 G/DL (ref 6–8.5)
RBC # BLD AUTO: 4.42 X10E6/UL (ref 3.77–5.28)
SODIUM SERPL-SCNC: 143 MMOL/L (ref 134–144)
TRIGL SERPL-MCNC: 159 MG/DL (ref 0–149)
VLDLC SERPL CALC-MCNC: 32 MG/DL (ref 5–40)
WBC # BLD AUTO: 7.6 X10E3/UL (ref 3.4–10.8)

## 2019-10-17 NOTE — TELEPHONE ENCOUNTER
----- Message from Janet Sheffield M.D. sent at 10/3/2019  8:01 PM PDT -----  Please call to inform patient that her recent Pap smear result is negative for cancer.  However, she has positive HPV twice in recent Pap smear result and previous test 1 year ago.  So she needs to follow with gynecologist to have colposcopy for further evaluation.  The referral to gynecologist was already placed and the referral coordinator will call her in 1 to 2 weeks.  She has negative vaginal swabs for gonorrhea, chlamydia, candida, trichomonas, bacterial vaginosis.    Janet Sheffield M.D.

## 2019-10-24 ENCOUNTER — HOSPITAL ENCOUNTER (OUTPATIENT)
Dept: RADIOLOGY | Facility: MEDICAL CENTER | Age: 64
End: 2019-10-24
Attending: INTERNAL MEDICINE
Payer: COMMERCIAL

## 2019-10-24 DIAGNOSIS — Z12.31 VISIT FOR SCREENING MAMMOGRAM: ICD-10-CM

## 2019-10-24 PROCEDURE — 77067 SCR MAMMO BI INCL CAD: CPT

## 2019-10-29 ENCOUNTER — OFFICE VISIT (OUTPATIENT)
Dept: MEDICAL GROUP | Age: 64
End: 2019-10-29
Payer: COMMERCIAL

## 2019-10-29 VITALS
HEIGHT: 63 IN | SYSTOLIC BLOOD PRESSURE: 124 MMHG | HEART RATE: 65 BPM | TEMPERATURE: 97.1 F | OXYGEN SATURATION: 97 % | WEIGHT: 179.8 LBS | DIASTOLIC BLOOD PRESSURE: 80 MMHG | BODY MASS INDEX: 31.86 KG/M2

## 2019-10-29 DIAGNOSIS — R87.810 CERVICAL HIGH RISK HPV (HUMAN PAPILLOMAVIRUS) TEST POSITIVE: ICD-10-CM

## 2019-10-29 DIAGNOSIS — Z23 NEED FOR VACCINATION: ICD-10-CM

## 2019-10-29 DIAGNOSIS — R10.13 EPIGASTRIC PAIN: ICD-10-CM

## 2019-10-29 DIAGNOSIS — I10 ESSENTIAL HYPERTENSION: ICD-10-CM

## 2019-10-29 DIAGNOSIS — E78.5 DYSLIPIDEMIA: ICD-10-CM

## 2019-10-29 PROCEDURE — 90686 IIV4 VACC NO PRSV 0.5 ML IM: CPT | Performed by: INTERNAL MEDICINE

## 2019-10-29 PROCEDURE — 99214 OFFICE O/P EST MOD 30 MIN: CPT | Mod: 25 | Performed by: INTERNAL MEDICINE

## 2019-10-29 PROCEDURE — 90471 IMMUNIZATION ADMIN: CPT | Performed by: INTERNAL MEDICINE

## 2019-10-29 NOTE — LETTER
October 29, 2019         Patient: Nila Quispe   YOB: 1955   Date of Visit: 10/29/2019           To Whom it May Concern:    Nila Quispe was seen in my clinic on 10/29/2019. Please excuse her from work today.  If you have any questions or concerns, please don't hesitate to call.        Sincerely,           Janet Sheffield M.D.  Electronically Signed

## 2019-10-29 NOTE — PROGRESS NOTES
Subjective:   Amos Quispe is a 63 y.o. female here today for evaluation and management of:    Essential hypertension  Patient is taking Metoprolol XL 25 mg daily, without side effects. Her blood pressure is stable and well controlled with current regimens. BP is 124/80 in clinic today.  Even though metoprolol is not the first-line for antihypertensive medication, we will continue metoprolol at this point time since patient has been taking metoprolol for a long time since previous primary care provider.    Dyslipidemia  Patient was taking Lovastatin 10 mg however cholesterol levels were not well controlled. She was started on Atorvastatin 20 mg every evening, which she was taking without side effects, up until she lost insurance. Due to financial issues, she was unable to afford the the medication. Cholesterol levels are still elevated however improved since starting Atorvastatin. She does currently have insurance now. I discussed the blood test with the patient in clinic today indicating that her kidney and liver function is within normal.    Results for AMOS RAMIRES (MRN [id number]) as of 10/29/2019 16:49   Ref. Range 8/24/2018 06:49 10/16/2019 07:32   Cholesterol,Tot Latest Ref Range: 100 - 199 mg/dL 221 (H) 190   Triglycerides Latest Ref Range: 0 - 149 mg/dL 182 (H) 159 (H)   HDL Latest Ref Range: >39 mg/dL 53 48   LDL Latest Ref Range: 0 - 99 mg/dL 132 (H) 110 (H)     Cervical high risk HPV (human papillomavirus) test positive  I discussed the pap smear results with the patient. Her pap smear in August 2018 was positive for HPV. Repeat pap smear in October 2019 was still positive for HPV. Due to 2 times positive HPV testing she was referred to follow-up with OBGYN. It was noted that she was called however she declined as she was not ready. She agrees to follow-up with OBGYN.      Epigastric pain  Patient presented to the clinic on 10/1/19 with complaints of epigastric pain and diarrhea  "for 2 weeks. Her diarrhea has resolved but she continues to have intermittent epigastric pain. She describes her abdominal pain as feeling like a menstrual cramp or squeezing from the inside. She was prescribed Ranitidine 150 mg for her symptoms however she states that this was making her abdominal pain worse therefore she stopped taking it. Per patient, her abdominal pain started suddenly one morning after eating eggs. Since onset, she has not been able to eat eggs due to exacerbated abdominal pain. She also endorses associated dysuria with her abdominal pain. She does not have abdominal pain or dysuria currently. Patient states that she tries to monitor her diet and avoid fatty meals. She does not frequently taking NSAID's and will take Ibuprofen maybe twice every 6 months. Discussed trying Prilosec and stopped Ranitidine as it is on recall. She states that she has tried Prilosec in the past which made her break out in hives. Denies melena, hematochezia, or hematuria.      Current medicines (including changes today)  Current Outpatient Medications   Medication Sig Dispense Refill   • atorvastatin (LIPITOR) 20 MG Tab Take 1 Tab by mouth every evening. 90 Tab 3   • metoprolol SR (TOPROL XL) 25 MG TABLET SR 24 HR Take 1 Tab by mouth every day. 90 Tab 3     No current facility-administered medications for this visit.      She  has a past medical history of Hyperlipidemia and Hypertension.    ROS   No chest pain, no shortness of breath, no abdominal pain       Objective:     /80 (BP Location: Left arm, Patient Position: Sitting, BP Cuff Size: Adult)   Pulse 65   Temp 36.2 °C (97.1 °F) (Temporal)   Ht 1.6 m (5' 3\")   Wt 81.6 kg (179 lb 12.8 oz)   SpO2 97%  Body mass index is 31.85 kg/m².     Physical Exam:  General: Alert, oriented and no acute distress.  Eye contact is good, speech goal directed, affect calm  HEENT: conjunctiva non-injected, sclera non-icteric.  Oral mucous membranes pink and moist with no " lesions.  Pinna normal.   Lungs: Normal respiratory effort, clear to auscultation bilaterally with good excursion.  CV: regular rate and rhythm. No murmurs.   Abdomen: soft, non distended, nontender, Bowel sound normal.  Ext: no edema, color normal, vascularity normal, temperature normal    Assessment and Plan:   The following treatment plan was discussed     1. Essential hypertension  - Well-controlled. Continue current regimens, Metoprolol XL 25 mg daily. Recheck lab 1-2 weeks before next follow up visit.  - Reviewed the risks and benefits as well as potential side effects of medications with patient.  - Discussed to eat low-sodium diet and encouraged to do regular physical exercise.  - Recommend to monitor blood pressure and heart rate at home.    2. Dyslipidemia  - Not well controlled. Patient was taking Atorvastatin 20 mg 1 tablet every evening and she will continue current regimens. We will make no new changes for now and she is advised to make lifestyle modifications. Recheck lab 1-2 weeks before next follow up visit.  - Reviewed the risks and benefits of treatment and potential side effects of medication.  - Advised to eat low fat, low carbohydrate and high fiber diet as well as do cardio physical exercise regularly.     3. Cervical high risk HPV (human papillomavirus) test positive  - Patient has 2 positive pap smears for HPV. She is referred to OBGYN clinic for follow-up.    4. Epigastric pain  - Patient continues to have epigastric pain. She states that she has already stopped taking Ranitidine due to worsening abdominal pain. Discussed different treatment options with the patient including trying Prilosec however patient declined as she states this caused hives.  Patient would like to postpone taking medication as she does not have any symptoms currently.  However, she has intermittent epigastric pain and left lower abdominal pain and she did not recall any triggering factor.  So I discussed with patient  to check for H. pylori and she agreed with the plan.  - She would like a referral to follow up with GI for her epigastric pain and left lower abdominal pain.   - Advised patient to avoid spicy foods, acidic foods, fatty meals, or excessive caffeine.   - Advised to avoid NSAID's. She can take Tylenol for pain as needed. She will continue to monitor.  - Patient will do a stool test before follow-up with GI.   - H.PYLORI STOOL ANTIGEN; Future  - REFERRAL TO GASTROENTEROLOGY    5. Need for vaccination  - Patient is due for the flu shot and would like to receive this in clinic today.   - Influenza vaccine was given today after reviewing risks and benefits as well as side effects of vaccine.  - Influenza Vaccine Quad Injection (PF)    6. Health Maintenance   - Patient will follow-up with her local pharmacy for shingles vaccine as she understands that we do not have this available in clinic today due to shortage.      Followup: Return in about 3 months (around 1/29/2020), or if symptoms worsen or fail to improve, for Hypertension, Hyperlipidemia, epigastric pain.      Please note that this dictation was created using voice recognition software. I have made every reasonable attempt to correct obvious errors, but I expect that there may have unintended errors in text, spelling, punctuation, or grammar that I did not discover.    I, Radha Hope (Majo), am scribing for, and in the presence of, Janet Sheffield M.D..    Electronically signed by: Radha Hope (Majo), 10/29/2019    I, Janet Sheffield M.D., personally performed the services described in this documentation, as scribed by Radha Hope in my presence, and it is both accurate and complete.

## 2019-12-24 ENCOUNTER — OFFICE VISIT (OUTPATIENT)
Dept: URGENT CARE | Facility: PHYSICIAN GROUP | Age: 64
End: 2019-12-24
Payer: COMMERCIAL

## 2019-12-24 ENCOUNTER — HOSPITAL ENCOUNTER (OUTPATIENT)
Dept: RADIOLOGY | Facility: MEDICAL CENTER | Age: 64
End: 2019-12-24
Attending: PHYSICIAN ASSISTANT
Payer: COMMERCIAL

## 2019-12-24 VITALS
RESPIRATION RATE: 18 BRPM | SYSTOLIC BLOOD PRESSURE: 142 MMHG | HEART RATE: 72 BPM | HEIGHT: 63 IN | BODY MASS INDEX: 31.01 KG/M2 | TEMPERATURE: 97.6 F | DIASTOLIC BLOOD PRESSURE: 86 MMHG | OXYGEN SATURATION: 99 % | WEIGHT: 175 LBS

## 2019-12-24 DIAGNOSIS — J98.01 BRONCHOSPASM: ICD-10-CM

## 2019-12-24 LAB
FLUAV+FLUBV AG SPEC QL IA: NEGATIVE
INT CON NEG: NORMAL
INT CON POS: NORMAL

## 2019-12-24 PROCEDURE — 87804 INFLUENZA ASSAY W/OPTIC: CPT | Performed by: PHYSICIAN ASSISTANT

## 2019-12-24 PROCEDURE — 71046 X-RAY EXAM CHEST 2 VIEWS: CPT

## 2019-12-24 PROCEDURE — 99214 OFFICE O/P EST MOD 30 MIN: CPT | Performed by: PHYSICIAN ASSISTANT

## 2019-12-24 RX ORDER — CODEINE PHOSPHATE AND GUAIFENESIN 10; 100 MG/5ML; MG/5ML
5 SOLUTION ORAL EVERY 4 HOURS PRN
Qty: 120 ML | Refills: 0 | Status: SHIPPED | OUTPATIENT
Start: 2019-12-24 | End: 2020-01-03

## 2019-12-24 RX ORDER — PREDNISONE 20 MG/1
TABLET ORAL
Qty: 10 TAB | Refills: 0 | Status: SHIPPED | OUTPATIENT
Start: 2019-12-24 | End: 2020-03-13

## 2019-12-24 RX ORDER — IPRATROPIUM BROMIDE AND ALBUTEROL SULFATE 2.5; .5 MG/3ML; MG/3ML
3 SOLUTION RESPIRATORY (INHALATION) ONCE
Status: COMPLETED | OUTPATIENT
Start: 2019-12-24 | End: 2019-12-24

## 2019-12-24 RX ORDER — ALBUTEROL SULFATE 90 UG/1
2 AEROSOL, METERED RESPIRATORY (INHALATION) EVERY 6 HOURS PRN
Qty: 8.5 G | Refills: 0 | Status: SHIPPED | OUTPATIENT
Start: 2019-12-24 | End: 2020-04-03

## 2019-12-24 RX ADMIN — IPRATROPIUM BROMIDE AND ALBUTEROL SULFATE 3 ML: 2.5; .5 SOLUTION RESPIRATORY (INHALATION) at 10:48

## 2019-12-24 ASSESSMENT — ENCOUNTER SYMPTOMS
SHORTNESS OF BREATH: 1
HEMOPTYSIS: 0
SPUTUM PRODUCTION: 1
FEVER: 0
CHILLS: 0
COUGH: 1
WHEEZING: 1
SORE THROAT: 1
PALPITATIONS: 0

## 2019-12-24 NOTE — PROGRESS NOTES
Subjective:      Nila Quispe is a 64 y.o. female who presents with Cough (cough, wheezing, SOB x3 days)            Cough   This is a new problem. The current episode started in the past 7 days. The problem has been unchanged. The cough is non-productive. Associated symptoms include a sore throat, shortness of breath and wheezing. Pertinent negatives include no chest pain, chills, ear pain, fever or hemoptysis. The symptoms are aggravated by lying down. She has tried OTC cough suppressant for the symptoms. The treatment provided no relief.       Review of Systems   Constitutional: Positive for malaise/fatigue. Negative for chills and fever.   HENT: Positive for congestion and sore throat. Negative for ear pain.    Respiratory: Positive for cough, sputum production, shortness of breath and wheezing. Negative for hemoptysis.    Cardiovascular: Negative for chest pain and palpitations.   All other systems reviewed and are negative.    PMH:  has a past medical history of Hyperlipidemia and Hypertension.  MEDS:   Current Outpatient Medications:   •  predniSONE (DELTASONE) 20 MG Tab, Take 40 mg by mouth once daily for 5 days., Disp: 10 Tab, Rfl: 0  •  albuterol 108 (90 Base) MCG/ACT Aero Soln inhalation aerosol, Inhale 2 Puffs by mouth every 6 hours as needed for Shortness of Breath., Disp: 8.5 g, Rfl: 0  •  guaifenesin-codeine (ROBITUSSIN AC) Solution oral solution, Take 5 mL by mouth every four hours as needed for Cough for up to 10 days., Disp: 120 mL, Rfl: 0  •  metoprolol SR (TOPROL XL) 25 MG TABLET SR 24 HR, Take 1 Tab by mouth every day., Disp: 90 Tab, Rfl: 3  •  atorvastatin (LIPITOR) 20 MG Tab, Take 1 Tab by mouth every evening. (Patient not taking: Reported on 12/24/2019), Disp: 90 Tab, Rfl: 3  ALLERGIES:   Allergies   Allergen Reactions   • Penicillins Hives     SURGHX:   Past Surgical History:   Procedure Laterality Date   • CHOLECYSTECTOMY  2009   • BREAST BIOPSY     • PRIMARY C SECTION      3  "times, last one in 1986   • US-NEEDLE CORE BX-BREAST PANEL Left      SOCHX:  reports that she has never smoked. She has never used smokeless tobacco. She reports that she does not drink alcohol or use drugs.  FH: Family history was reviewed, no pertinent findings to report  Medications, Allergies, and current problem list reviewed today in Epic         Objective:     Blood Pressure 142/86   Pulse 72   Temperature 36.4 °C (97.6 °F)   Respiration 18   Height 1.6 m (5' 3\")   Weight 79.4 kg (175 lb)   Last Menstrual Period  (LMP Unknown)   Oxygen Saturation 99%   Body Mass Index 31.00 kg/m²      Physical Exam  Vitals signs reviewed.   Constitutional:       General: She is not in acute distress.     Appearance: She is well-developed. She is not ill-appearing or toxic-appearing.      Interventions: She is not intubated.  HENT:      Head: Normocephalic and atraumatic.      Right Ear: Hearing, tympanic membrane, ear canal and external ear normal.      Left Ear: Hearing, tympanic membrane, ear canal and external ear normal.      Nose: Nose normal.      Mouth/Throat:      Pharynx: Uvula midline.   Eyes:      General: Lids are normal.      Conjunctiva/sclera: Conjunctivae normal.   Neck:      Musculoskeletal: Full passive range of motion without pain, normal range of motion and neck supple.   Cardiovascular:      Rate and Rhythm: Regular rhythm.      Heart sounds: Normal heart sounds, S1 normal and S2 normal. No murmur. No friction rub. No gallop.    Pulmonary:      Effort: Pulmonary effort is normal. No tachypnea, bradypnea, accessory muscle usage or respiratory distress. She is not intubated.      Breath sounds: Wheezing present. No decreased breath sounds, rhonchi or rales.   Chest:      Chest wall: No tenderness.   Musculoskeletal: Normal range of motion.   Skin:     General: Skin is warm and dry.   Neurological:      Mental Status: She is alert and oriented to person, place, and time.   Psychiatric:         " Speech: Speech normal.         Behavior: Behavior normal.         Thought Content: Thought content normal.         Judgment: Judgment normal.            12/24/2019 9:56 AM     HISTORY/REASON FOR EXAM:  Shortness of Breath.        TECHNIQUE/EXAM DESCRIPTION AND NUMBER OF VIEWS:  Two views of the chest.     COMPARISON:  None.     FINDINGS:  The heart is normal in size.  No pulmonary infiltrates or consolidations are noted.  No pleural effusions are appreciated.        IMPRESSION:     No active disease.    Rapid Flu: NEG     Assessment/Plan:       1. Bronchospasm  - Pt felt much better after duoneb  - DX-CHEST-2 VIEWS; Future  - ipratropium-albuterol (DUONEB) nebulizer solution  - POCT Influenza A/B  - predniSONE (DELTASONE) 20 MG Tab; Take 40 mg by mouth once daily for 5 days.  Dispense: 10 Tab; Refill: 0  - albuterol 108 (90 Base) MCG/ACT Aero Soln inhalation aerosol; Inhale 2 Puffs by mouth every 6 hours as needed for Shortness of Breath.  Dispense: 8.5 g; Refill: 0  - guaifenesin-codeine (ROBITUSSIN AC) Solution oral solution; Take 5 mL by mouth every four hours as needed for Cough for up to 10 days.  Dispense: 120 mL; Refill: 0    Differential diagnosis, natural history, supportive care discussed. Follow-up with primary care provider within 7-10 days, emergency room precautions discussed.  Patient and/or family appears understanding of information.  Handout and review of patients diagnosis and treatment was discussed extensively.

## 2019-12-24 NOTE — PATIENT INSTRUCTIONS
Broncoespasmo - Adultos  (Bronchospasm, Adult)  Broncoespasmo significa que hay un espasmo o restricción de las vías aéreas que llevan el aire a los pulmones. Debbie el broncoespasmo, la respiración se hace más difícil debido a que las vías respiratorias se contraen. Cuando esto ocurre, puede milton tos, un silbido al respirar (sibilancias) presión en el pecho y dificultad para respirar. Generalmente se asocia al asma, odilia no todos los pacientes que experimentan broncoespasmos sufren asma.  CAUSAS  La causa del broncoespasmo es la inflamación o la irritación de las vías respiratorias. La inflamación o la irritación pueden milton sido desencadenadas por:  · Alergias (por ejemplo a animales, polen, alimentos y moho). Los alérgenos que causan el broncoespasmo pueden producir sibilancias inmediatamente después de la exposición, o varias horas después.  · Infección. Se considera que la causa más frecuente son las infecciones virales.  · Ejercicios.  · Irritantes (tri la polución, humo de cigarrillos, olores radha, aerosoles y vapores de pintura).  · Los cambios climáticos. El viento aumenta la cantidad de moho y polen del aire. La pepe limpia el aire arrastrando las sustancias irritantes. El aire frío puede causar inflamación.  · Estrés y problemas emocionales.  SIGNOS Y SÍNTOMAS  · Sibilancias.  · Tos excesiva debbie la noche.  · Tos frecuente o intensa debbie un resfrío común.  · Opresión en el pecho.  · Falta de aire.  DIAGNÓSTICO  El broncoespasmo se diagnostica con la historia clínica y un examen físico. En algunos casos se indican otros estudios, tri radiografías de tórax, para descartar otras enfermedades.  TRATAMIENTO  · Le indicarán medicamentos por vía inhalatoria para abrir las vías respiratorias y ayudarlo a respirar. Los medicamentos se administran por medio de un inhalador o un nebulizador.  · Le administrarán corticoides en los casos de broncoespasmo grave, generalmente cuando se asocia al  asma.  INSTRUCCIONES PARA EL CUIDADO EN EL HOGAR  · Siempre tenga un plan preparado para pedir asistencia médica. Sepa cuando debe llamar al médico y a los servicios de emergencia de valverde localidad (911 en [place]). Sepa donde puede acceder a un servicio de emergencias.  · [place] todos los medicamentos tri le indicó el médico.  · Si le indicaron el uso de un inhalador o nebulizador, consulte a valverde médico para que le explique cómo usarlo correctamente. Siempre use un espaciador con el inhalador, si le indicaron valverde uso.  · Es necesario permanecer muy tranquilo debbie el ataque. Trate de relajarse y respirar más lentamente.  · Controle el ambiente del hogar del siguiente modo:  ¨ Cambie el filtro de la calefacción y del aire acondicionado al menos chey vez al mes.  ¨ Limite el uso de hogares o estufas a leña.  ¨ No fume y nopermita que fumen en valverde hogar.  ¨ Evite la exposición a perfumes y fragancias.  ¨ Elimine las plagas (tri cucarachas, ratones) y estrellita excrementos.  ¨ Elimine las plantas si observa moho en ellas.  ¨ Mantenga valverde casa limpia y neris de polvo.  ¨ Reemplace las alfombras por pisos de clark, baldosas o vinilo. Las alfombras pueden retener las escamas o pelos de los animales y el polvo.  ¨ Use almohadas, mantas y cubre colchones antialérgicos.  ¨ Lave las sábanas y las mantas todas las semanas con [place] y séquelas con aire caliente.  ¨ Use mantas de poliester o algodón.  ¨ Lávese las kerline con frecuencia.  SOLICITE ATENCIÓN MÉDICA SI:  · Tiene mariel musculares.  · Siente dolor en el pecho.  · El esputo cambia de un color derick o campuzano a un color amarillo, yong, stefanie o sanguinolento.  · El esputo que elimina es más espeso.  · Tiene problemas relacionados con el medicamento que recibe. tri urticaria, picazón, hinchazón o dificultades respiratorias.  SOLICITE ATENCIÓN MÉDICA DE INMEDIATO SI:  · Empeoran las sibilancias y la tos, aún después de sheryl los medicamentos recetados.  · Tiene chey creciente  dificultad para respirar.  · Siente un dolor intenso en el pecho.  ASEGÚRESE DE QUE:  · Comprende estas instrucciones.  · Controlará valverde afección.  · Recibirá ayuda de inmediato si no mejora o si empeora.  Esta información no tiene tri fin reemplazar el consejo del médico. Asegúrese de hacerle al médico cualquier pregunta que tenga.  Document Released: 03/26/2009 Document Revised: 01/08/2016 Document Reviewed: 06/09/2014  Elsevier Interactive Patient Education © 2017 [employer]

## 2020-01-26 ENCOUNTER — OFFICE VISIT (OUTPATIENT)
Dept: URGENT CARE | Facility: PHYSICIAN GROUP | Age: 65
End: 2020-01-26
Payer: COMMERCIAL

## 2020-01-26 ENCOUNTER — HOSPITAL ENCOUNTER (OUTPATIENT)
Dept: RADIOLOGY | Facility: MEDICAL CENTER | Age: 65
End: 2020-01-26
Attending: PHYSICIAN ASSISTANT
Payer: COMMERCIAL

## 2020-01-26 VITALS
BODY MASS INDEX: 30.12 KG/M2 | DIASTOLIC BLOOD PRESSURE: 76 MMHG | OXYGEN SATURATION: 98 % | HEIGHT: 63 IN | TEMPERATURE: 97.4 F | SYSTOLIC BLOOD PRESSURE: 136 MMHG | HEART RATE: 54 BPM | RESPIRATION RATE: 12 BRPM | WEIGHT: 170 LBS

## 2020-01-26 DIAGNOSIS — S83.92XA SPRAIN OF LEFT KNEE, UNSPECIFIED LIGAMENT, INITIAL ENCOUNTER: ICD-10-CM

## 2020-01-26 DIAGNOSIS — S01.81XA FACIAL LACERATION, INITIAL ENCOUNTER: Primary | ICD-10-CM

## 2020-01-26 DIAGNOSIS — S01.81XA FACIAL LACERATION, INITIAL ENCOUNTER: ICD-10-CM

## 2020-01-26 DIAGNOSIS — S09.90XA INJURY OF HEAD, INITIAL ENCOUNTER: ICD-10-CM

## 2020-01-26 DIAGNOSIS — S00.03XA HEMATOMA OF SCALP, INITIAL ENCOUNTER: ICD-10-CM

## 2020-01-26 PROCEDURE — 90471 IMMUNIZATION ADMIN: CPT | Performed by: PHYSICIAN ASSISTANT

## 2020-01-26 PROCEDURE — 12052 INTMD RPR FACE/MM 2.6-5.0 CM: CPT | Performed by: PHYSICIAN ASSISTANT

## 2020-01-26 PROCEDURE — 90715 TDAP VACCINE 7 YRS/> IM: CPT | Performed by: PHYSICIAN ASSISTANT

## 2020-01-26 PROCEDURE — 70450 CT HEAD/BRAIN W/O DYE: CPT

## 2020-01-26 PROCEDURE — 99214 OFFICE O/P EST MOD 30 MIN: CPT | Mod: 25 | Performed by: PHYSICIAN ASSISTANT

## 2020-01-26 ASSESSMENT — ENCOUNTER SYMPTOMS
VOMITING: 0
COUGH: 0
FEVER: 0
CONSTIPATION: 0
FOCAL WEAKNESS: 0
NAUSEA: 0
TINGLING: 0
DIZZINESS: 0
SENSORY CHANGE: 0
DIARRHEA: 0
JOINT SWELLING: 0
CHILLS: 0
LOSS OF CONSCIOUSNESS: 0
ABDOMINAL PAIN: 0
HEADACHES: 0
SHORTNESS OF BREATH: 0
SPEECH CHANGE: 0
WEAKNESS: 0

## 2020-01-26 NOTE — LETTER
January 26, 2020         Patient: Nila Quispe   YOB: 1955   Date of Visit: 1/26/2020           To Whom it May Concern:    Nila Quispe was seen in my clinic on 1/26/2020. She may return to work on 1/29/20 or sooner if symptoms improve.    If you have any questions or concerns, please don't hesitate to call.        Sincerely,           Francine Street P.A.-C.  Electronically Signed

## 2020-01-26 NOTE — PROGRESS NOTES
Subjective:   Nila Quispe is a 64 y.o. female who presents for Head Injury (fell in bathrub, head lac, left knee pain )        Laceration    The incident occurred 1 to 3 hours ago. The laceration is located on the face (L forehead). The laceration is 6 cm in size. The laceration mechanism was a blunt object (pt fell in shower and hit forehead). The pain is mild. The pain has been constant since onset. She reports no foreign bodies present. Her tetanus status is unknown.   Knee Pain   This is a new problem. The current episode started today. The problem has been unchanged. Pertinent negatives include no abdominal pain, chills, coughing, fever, headaches, joint swelling, nausea, vomiting or weakness. She has tried nothing for the symptoms.     The patient slipped and fell in the shower this morning hitting her left forehead and left knee on the tub.  She denies loss of consciousness.  She sustained a large laceration and bruising over the left forehead.  She denies recent head injury.  She is not currently on blood thinners.  She denies nausea or vomiting.  No visual changes.  No numbness or tingling.  No weakness in extremities.  Knee pain is worse with palpation.  She is able to weight-bear.  No previous injury or surgeries on the left knee.    Review of Systems   Constitutional: Negative for chills, fever and malaise/fatigue.   Respiratory: Negative for cough and shortness of breath.    Gastrointestinal: Negative for abdominal pain, constipation, diarrhea, nausea and vomiting.   Musculoskeletal: Positive for joint pain (Left knee). Negative for joint swelling.   Skin:        Pos face laceration   Neurological: Negative for dizziness, tingling, sensory change, speech change, focal weakness, loss of consciousness, weakness and headaches.   All other systems reviewed and are negative.      PMH:  has a past medical history of Hyperlipidemia and Hypertension.  MEDS:   Current Outpatient Medications:   •   "albuterol 108 (90 Base) MCG/ACT Aero Soln inhalation aerosol, Inhale 2 Puffs by mouth every 6 hours as needed for Shortness of Breath., Disp: 8.5 g, Rfl: 0  •  metoprolol SR (TOPROL XL) 25 MG TABLET SR 24 HR, Take 1 Tab by mouth every day., Disp: 90 Tab, Rfl: 3  •  predniSONE (DELTASONE) 20 MG Tab, Take 40 mg by mouth once daily for 5 days. (Patient not taking: Reported on 1/26/2020), Disp: 10 Tab, Rfl: 0  •  atorvastatin (LIPITOR) 20 MG Tab, Take 1 Tab by mouth every evening. (Patient not taking: Reported on 12/24/2019), Disp: 90 Tab, Rfl: 3  ALLERGIES:   Allergies   Allergen Reactions   • Penicillins Hives     SURGHX:   Past Surgical History:   Procedure Laterality Date   • CHOLECYSTECTOMY  2009   • BREAST BIOPSY     • PRIMARY C SECTION      3 times, last one in 1986   • US-NEEDLE CORE BX-BREAST PANEL Left      SOCHX:  reports that she has never smoked. She has never used smokeless tobacco. She reports current alcohol use. She reports that she does not use drugs.  Family History   Problem Relation Age of Onset   • Cancer Mother         Cervical Cancer   • Heart Attack Father         Objective:   /76   Pulse (!) 54   Temp 36.3 °C (97.4 °F)   Resp 12   Ht 1.6 m (5' 3\")   Wt 77.1 kg (170 lb)   LMP  (LMP Unknown)   SpO2 98%   BMI 30.11 kg/m²     Physical Exam  Vitals signs reviewed.   Constitutional:       General: She is not in acute distress.     Appearance: She is well-developed.   HENT:      Head: Normocephalic.        Right Ear: External ear normal.      Left Ear: External ear normal.      Nose: Nose normal.      Mouth/Throat:      Mouth: Mucous membranes are moist.   Eyes:      General: No visual field deficit.     Conjunctiva/sclera: Conjunctivae normal.      Pupils: Pupils are equal, round, and reactive to light.   Neck:      Musculoskeletal: Normal range of motion and neck supple.      Trachea: No tracheal deviation.   Cardiovascular:      Rate and Rhythm: Normal rate and regular rhythm. "   Pulmonary:      Effort: Pulmonary effort is normal.      Breath sounds: Normal breath sounds.   Musculoskeletal:      Left knee: She exhibits normal range of motion, no swelling, no effusion, no ecchymosis, normal patellar mobility and no bony tenderness. Tenderness found. Lateral joint line tenderness noted.   Skin:     General: Skin is warm and dry.      Capillary Refill: Capillary refill takes less than 2 seconds.   Neurological:      General: No focal deficit present.      Mental Status: She is alert and oriented to person, place, and time.      Cranial Nerves: Cranial nerves are intact. No cranial nerve deficit or facial asymmetry.      Sensory: Sensation is intact.      Motor: Motor function is intact. No weakness, tremor, atrophy or pronator drift.      Coordination: Coordination is intact. Romberg sign negative. Finger-Nose-Finger Test and Heel to Shin Test normal.      Gait: Gait is intact. Gait normal.   Psychiatric:         Mood and Affect: Mood normal.         Behavior: Behavior normal.         IMPRESSION:     No acute intracranial abnormality.     Left frontal scalp aspiration/contusion.      Assessment/Plan:     1. Facial laceration, initial encounter  CT-HEAD W/O    Tdap Vaccine =>8YO IM   2. Sprain of left knee, unspecified ligament, initial encounter     3. Injury of head, initial encounter  CT-HEAD W/O   4. Hematoma of scalp, initial encounter  CT-HEAD W/O     Procedure Note: The area was prepped and draped in the usual sterile fashion. Local anesthesia was achieved using 5.5 cc of  Lidocaine 1% with epinephrine. The wound was copiously irrigated. 5-0 vycril x 2 subcutaneous sutures, 6-0 Nylon interrupted sutures x 8 were placed. Estimated blood loss was less than 0.5 mL.     A dressing was applied to the area and anticipatory guidance, as well as standard post-procedure care, was explained. Return precautions are given. The patient tolerated the procedure well without complications.    Rest ice  elevate left knee.  Alternate ibuprofen 600 mg and Tylenol.  Laceration wound care handout provided.    Follow-up visit in 5 days for suture removal and evaluation of the laceration.    If symptoms worsen or persist patient can return to clinic for reevaluation.  Red flags and emergency room precautions discussed. All side effects of medication discussed including allergic response, GI upset, tendon injury, etc. Patient and significant other confirmed understanding of information.    Please note that this dictation was created using voice recognition software. I have made every reasonable attempt to correct obvious errors, but I expect that there are errors of grammar and possibly content that I did not discover before finalizing the note.

## 2020-01-26 NOTE — PATIENT INSTRUCTIONS
Cuidado de las heridas suturadas  (Sutured Wound Care)  Las suturas son puntos que pueden usarse para cerrar heridas. El cuidado correcto de las heridas puede ayudar a evitar el dolor y a prevenir las infecciones. Además puede ayudar a que la cicatrización sea más rápida.  CÓMO CUIDAR LA HERIDA SUTURADA  Cuidados de la herida   · Mantenga la herida limpia y seca.  · Si le colocaron chey venda (vendaje), deberá cambiarla por lo menos chey vez al día o tri se lo haya indicado el médico. También debe cambiarla si se moja o se ensucia.  · Mantenga la herida completamente seca debbie las primeras 24 horas o tri se lo haya indicado el médico. Transcurrido mara tiempo, puede ducharse o sheryl maggie de inmersión. No obstante, asegúrese de no sumergir la herida en agua hasta que le hayan quitado las suturas.  · Limpie la herida chey vez al día o tri se lo haya indicado el médico.  ¨ Lave la herida con agua y jabón.  ¨ Enjuáguela con agua para quitar todo el jabón.  ¨ Seque dando palmaditas con chey toalla limpia. No frote la herida.  · Después de limpiar la herida, aplique chey capa delgada de ungüento con antibiótico tri se lo haya indicado el médico. Lavelle ayudará a prevenir las infecciones y a evitar que el vendaje se adhiera a la herida.  · El retiro de las suturas debe hacerse tri se lo haya indicado el médico.  Instrucciones generales   · [place] o aplíquese los medicamentos solamente tri se lo haya indicado el médico.  · Para ayudar a evitar la formación de cicatrices, cúbrase la herida con pantalla solar siempre que esté al aire neris, después de que se hayan retirado las suturas y la herida haya cicatrizado. Use chey pantalla solar con factor de protección solar (FPS) de por lo menos 30.  · Si le recetaron antibióticos o un ungüento, asegúrese de terminarlos, incluso si comienza a sentirse mejor.  · No se rasque ni se toque la herida.  · Concurra a todas las visitas de control tri se lo haya indicado el médico. Lavelle es  importante.  · Controle la herida todos los días para detectar signos de infección. Esté atento a lo siguiente:  ¨ Dolor, hinchazón o enrojecimiento.  ¨ Líquido, reina o pus.  · Cuando esté sentado o acostado, eleve la pino de la lesión por encima del nivel del corazón, si es posible.  · No estire la herida.  · Luz Maria suficiente líquido para mantener la orina remy o de color amarillo pálido.  SOLICITE ATENCIÓN MÉDICA SI:  · Le aplicaron la antitetánica y tiene hinchazón, dolor intenso, enrojecimiento o hemorragia en el sitio de la inyección.  · Tiene fiebre.  · La herida estaba cerrada y se abre.  · Percibe que sale mal olor de la herida.  · Nota un cuerpo extraño en la herida, tri un trozo de clark o edwin.  · El dolor no se ara con los medicamentos.  · Tiene más enrojecimiento, hinchazón o dolor en el lugar de la herida.  · Observa líquido, reina o pus que salen de la herida.  · Observa que la piel cerca de la herida cambia de color.  · Debe cambiar el vendaje con frecuencia debido a que hay secreción de líquido, reina o pus de la herida.  · Aparece chey nueva erupción cutánea.  · Tiene entumecimiento alrededor de la herida.  SOLICITE ATENCIÓN MÉDICA DE INMEDIATO SI:  · Tiene mucha hinchazón alrededor del lugar de la lesión.  · El dolor aumenta repentinamente y es intenso.  · Tiene bultos dolorosos cerca de la herida o en la piel en cualquier parte del cuerpo.  · Tiene chey línea sid que sale de la herida.  · La herida está en la mano o en el pie y no puede  correctamente jaxson de los dedos.  · La herida está en la mano o en el pie y observa que los dedos tienen un shania pálido o azulado.  Esta información no tiene tri fin reemplazar el consejo del médico. Asegúrese de hacerle al médico cualquier pregunta que tenga.  Document Released: 12/18/2006 Document Revised: 05/03/2016 Document Reviewed: 07/30/2014  Elsevier Interactive Patient Education © 2017 Elsevier Inc[employer]

## 2020-01-31 ENCOUNTER — OFFICE VISIT (OUTPATIENT)
Dept: URGENT CARE | Facility: PHYSICIAN GROUP | Age: 65
End: 2020-01-31
Payer: COMMERCIAL

## 2020-01-31 VITALS
HEIGHT: 63 IN | SYSTOLIC BLOOD PRESSURE: 140 MMHG | TEMPERATURE: 98.3 F | BODY MASS INDEX: 30.12 KG/M2 | DIASTOLIC BLOOD PRESSURE: 82 MMHG | WEIGHT: 170 LBS | OXYGEN SATURATION: 99 % | RESPIRATION RATE: 15 BRPM | HEART RATE: 66 BPM

## 2020-01-31 DIAGNOSIS — Z48.02 VISIT FOR SUTURE REMOVAL: ICD-10-CM

## 2020-01-31 PROCEDURE — 99024 POSTOP FOLLOW-UP VISIT: CPT | Performed by: NURSE PRACTITIONER

## 2020-01-31 ASSESSMENT — PAIN SCALES - GENERAL: PAINLEVEL: 7=MODERATE-SEVERE PAIN

## 2020-01-31 NOTE — PROGRESS NOTES
Pt presents for suture removal from a lac that was repaired 5 days ago  Healing well  States she is having some headaches and facial tenderness at contusion site post fall  Discussed with patient symptoms likely concussion syndrome secondary to fall  CT was WNL  Sutures removed, pt tolerated well  Concussion handout provided  Work restrictions provided for one week  Please RTC if symptoms do not improve

## 2020-01-31 NOTE — LETTER
January 31, 2020         Patient: Nila Quispe   YOB: 1955   Date of Visit: 1/31/2020           To Whom it May Concern:    Nila Quispe was seen in my clinic on 1/31/2020. Please allow her light duty at work.  She cannot lift anything greater than 20 lbs, cannot bend over at the waist or throw heavy items. These restrictions are from 2/1/20-2/8/20. She will return to full duty on 2/9/20.        Sincerely,           DUTCH Day.  Electronically Signed

## 2020-01-31 NOTE — PATIENT INSTRUCTIONS
Conmoción cerebral - Adultos  (Concussion, Adult)  Chey conmoción cerebral, o traumatismo cerebral cerrado, es chey lesión cerebral causada por un golpe directo en la luz o por un movimiento rápido y brusco (sacudida) de la luz o el ann marie. Generalmente no pone en peligro la ulises. Aún así, los efectos de chey conmoción cerebral pueden ser graves. Si sufrió chey conmoción cerebral antes, es más probable que experimente síntomas similares a chey conmoción cerebral después de un golpe directo en la luz.  CAUSAS  · Un golpe directo en la luz, tri al chocar contra otro jugador en un partido de fútbol, recibir un golpe en chey lai o golpearse la luz contra chey superficie dura.  · Chey sacudida de la luz o el ann marie que hace que el cerebro se mueva hacia adelante y hacia atrás dentro del cráneo, tri en un choque automovilístico.  SIGNOS Y SÍNTOMAS  Los signos de chey conmoción cerebral pueden ser difíciles de determinar. En un primer momento, los pacientes, familiares y profesionales joi vez no los adviertan. Puede ser que aparentemente esté normal odilia que actúe o se sienta diferente.  Por lo general, los síntomas son transitorios odilia pueden durar algunos días, semanas o aún más. Algunos síntomas pueden aparecer inmediatamente mientras otros pueden manifestarse después de algunas horas o días. Cada lesión en la luz es diferente. Los síntomas son:  · Dolor de luz leve a moderado, que no se ara.  · Sensación de presión dentro de la luz.  · Presentar más dificultad que lo habitual para:  ¨ Aprender o recordar cosas que ha escuchado.  ¨ Responder preguntas.  ¨ Prestar atención o concentrarse.  ¨ Organizar las tareas diarias.  ¨ Bernardino decisiones y resolver problemas.  · Lentitud para pensar, actuar o reaccionar, hablar o leer.  · Sentirse perdido o confundirse con facilidad.  · Sentirse cansado todo el tiempo o con falta de energía (fatigado).  · Sentirse somnoliento.  · Trastornos del  sueño.  ¨ Dormir más que lo habitual.  ¨ Dormir menos que lo habitual.  ¨ Problemas para conciliar el sueño.  ¨ Problemas para dormir (insomnio).  · Pérdida del equilibrio, sensación de mareo.  · Náuseas o vómitos.  · Adormecimiento u hormigueo.  · Mayor sensibilidad para:  ¨ Los sonidos.  ¨ Las luces.  ¨ Distracciones.  · Problemas visuales o cansancio en los ojos.  · Disminución del sentido del gusto o el olfato.  · Zumbidos en los oídos.  · Cambios de humor, tri sentirse manny o ansioso.  · Irritación o enojo por cosas pequeñas o sin motivos.  · Falta de motivación.  · Douglas o escuchar cosas que otras personas no rhys ni escuchan (alucinaciones).  DIAGNÓSTICO  El médico diagnosticará chey conmoción cerebral basándose en la descripción de la lesión y los síntomas. Le preguntará si se desmayó (perdió el conocimiento) y si tiene dificultad para recordar los sucesos ocurridos inmediatamente antes y debbie el traumatismo.  La evaluación también puede incluir:  · Un escaneo cerebral para encontrar signos de lesión cerebral. Aunque los estudios no muestren lesiones, igual puede milton sufrido chey conmoción cerebral.  · Análisis de reina para asegurarse de que no hay otros problemas.  TRATAMIENTO  · La mayor parte de las conmociones se tratan en el servicio de emergencias, el área de atención de urgencia o el consultorio médico. Es posible que deba permanecer en el hospital debbie la noche para completar el tratamiento.  · Informe a valverde médico si aissatou medicamentos, incluidos los medicamentos recetados, medicamentos de venta neris y giovanni naturales. Algunos medicamentos, tri los anticoagulantes y la aspirina, pueden aumentar la probabilidad de sufrir complicaciones. También informe a valverde médico si ha bebido alcohol o consume drogas. Esta información puede afectar el tratamiento.  · El médico le dará el mayela con instrucciones importantes que deberá seguir.  · La velocidad de recuperación de chey conmoción cerebral depende  de varios factores. Entre ellos se incluyen la gravedad de la conmoción cerebral, la pino del cerebro lesionada, la edad y el estado de dimitri previo a la conmoción cerebral.  · La mayoría de las personas que mclaughlin sufrido chey lesión leve se recuperan completamente. La recuperación puede llevar tiempo. En general, la recuperación es más lenta en las personas mayores. Además, a aquellas personas que ya mclaughlin sufrido chey conmoción cerebral en el pasado les llevará más tiempo recuperarse de la lesión actual.  INSTRUCCIONES PARA EL CUIDADO EN EL HOGAR  Instrucciones generales   · Siga cuidadosamente las indicaciones que valverde médico le ha dado.  · Utilice los medicamentos de venta neris o recetados para calmar el dolor, el malestar o la fiebre, según se lo indique el médico.  · [place] sólo los medicamentos que valverde médico le haya autorizado.  · No aristeo alcohol hasta que valverde médico lo autorice. El alcohol y algunas otras sustancias pueden demorar valverde recuperación y ponerlo en riesgo de nuevas lesiones.  · Si le resulta más difícil que lo habitual recordar las cosas, escríbalas.  · Trate de hacer chey cosa por vez si se distrae con facilidad. Por ejemplo, no sy televisión mientras prepara la lonnie.  · Consulte con familiares y amigos si debe sheryl decisiones importantes.  · Cumpla con todas las visitas de control. Se recomienda realizar varias evaluaciones de los síntomas para favorecer valverde recuperación.  · Controle estrellita síntomas y pídale a otras personas que también lo saira. En algunos casos pueden aparecer complicaciones luego de chey conmoción cerebral. Los adultos mayores con lesión cerebral pueden tener un mayor riesgo de complicaciones graves, tri un coágulo sanguíneo en el cerebro.  · Informe a los maestros, el departamento de enfermería de la escuela, el consejero escolar, el entrenador o director acerca de valverde lesión, los síntomas y las restricciones que tiene. Hágales saber lo que puede y lo que no puede hacer. Ellos deberán  observar:  ¨ Aumento en los problemas de atención o concentración.  ¨ Más dificultades para recordar o aprender información nueva.  ¨ Aumento del tiempo que necesita para completar tareas o encargos.  ¨ Aumento de la irritabilidad o disminución de la capacidad para enfrentar el estrés.  ¨ Aparición de nuevos síntomas.  · Nazanin reposo. El descanso favorece la curación del cerebro. Asegúrese de que:  ¨ Duerme andrea por la noche. Evite quedarse despierto muy tarde por la noche.  ¨ Se va a dormir a la misma hora los días de semana y los fines de semana.  ¨ Descanse debbie el día. Angelita siestas debbie el día o momentos de descanso cuando se siente cansado.  · Limita las actividades que requieren mucha atención o concentración. Estas pueden ser:  ¨ Tareas para el hogar o trabajos relacionados con el empleo.  ¨ Mirar televisión.  ¨ Trabajar en la computadora.  · Evite toda situación en la que se pudiera producir otra lesión cerebral (fútbol americano, hockey, fútbol, básquet, artes marciales, deportes de azam jd abajo y andar a ella). El problema empeorará cada vez que experimente chey conmoción cerebral. Debe evitar estas actividades hasta que sea evaluado por el médico correspondiente.  Regreso a las actividades habituales   Debe volver a estrellita actividades habituales gradualmente, no de chey roberta vez. Debe darles al cuerpo y el cerebro valverde tiempo para recuperarse.  · No retome la práctica de deportes ni otras actividades deportivas hasta que valverde médico le diga que puede hacerlo.  · Consulte a valverde médico sobre cuándo puede conducir automóviles, andar en bicicleta u operar máquinas pesadas. La capacidad para reaccionar puede ser más lenta luego de chey lesión cerebral. Nunca realice estas actividades si se siente mareado.  · Pregunte a valverde médico cuando podrá volver a la escuela o al trabajo.  Prevención de otra conmoción cerebral   Es muy importante que evite otra lesión cerebral, especialmente antes de que se haya  recuperado. En casos raros, chey nueva lesión puede causar daños cerebrales permanentes, hinchazón del cerebro o la muerte. El riesgo es mayor debbie los primeros 7 a 10 días después de chey lesión en la luz. Evite las lesiones:  · Use el cinturón de seguridad al conducir un automóvil.  · Luz Maria alcohol solo con moderación.  · Use un kathleen cuando sandor en bicicleta, esquíe, patine o realice actividades similares.  · Evite actividades que podrían causar chey segunda conmoción cerebral, tri deportes de contacto o recreativos hasta que valverde médico lo autorice.  · Implemente medidas de seguridad en el hogar.  ¨ Evite el desorden y objetos que puedan ser peligrosos en pisos y escaleras.  ¨ Coloque barras en los maggie y pasamanos en las escaleras.  ¨ Ponga alfombras antideslizantes en pisos y bañeras.  ¨ Mejore la iluminación en zonas de penumbra.  SOLICITE ATENCIÓN MÉDICA SI:  · Aumentan estrellita problemas de atención o concentración.  · Aumentan estrellita dificultades para recordar o aprender información nueva.  · Necesita más tiempo que antes para completar las tareas o asignaciones.  · Aumenta la irritabilidad o disminuye la capacidad para enfrentar el estrés.  · Tiene más síntomas que antes.  Solicite atención médica si presenta alguno de los siguientes síntomas debbie más de 2 semanas después de valverde lesión:  · Dolor de luz que perdura (crónico).  · Mareos o problemas con el equilibrio.  · Náuseas.  · Problemas de visión.  · Aumento de la sensibilidad a los ruidos o la melanie.  · Depresión y cambios en el estado de ánimo.  · Ansiedad o irritabilidad.  · Problemas de memoria.  · Dificultad para concentrarse o mantener la atención.  · Problemas para dormir.  · Cansancio permanente.  SOLICITE ATENCIÓN MÉDICA DE INMEDIATO SI:  · Los mariel de luz son intensos o empeoran. [place] puede ser un signo de que hay un coágulo sanguíneo en la luz.  · Siente debilidad (aun si es solo en chey mano, chey pierna o parte del elin).  · Siente  entumecimiento.  · Le falta coordinación.  · Vomita repetidas veces.  · Está más somnoliento.  · Chey pupila está más lukas que la otra.  · Tiene convulsiones.  · Tiene dificultad para hablar.  · Siente chey confusión que va en aumento. [place] puede ser un signo de que hay un coágulo sanguíneo en el cerebro.  · Aumenta la intranquilidad, la agitación o la irritabilidad.  · No puede reconocer personas o lugares.  · Siente dolor en el ann marie.  · Le resulta difícil despertarse.  · Tiene cambios de conducta inusuales.  · Pierde la conciencia.  ASEGÚRESE DE QUE:  · Comprende estas instrucciones.  · Controlará valverde afección.  · Recibirá ayuda de inmediato si no mejora o si empeora.  Esta información no tiene tri fin reemplazar el consejo del médico. Asegúrese de hacerle al médico cualquier pregunta que tenga.  Document Released: 11/30/2009 Document Revised: 01/08/2016 Document Reviewed: 07/10/2014  Elsevier Interactive Patient Education © 2017 [employer] Inc.

## 2020-03-13 ENCOUNTER — OFFICE VISIT (OUTPATIENT)
Dept: MEDICAL GROUP | Facility: PHYSICIAN GROUP | Age: 65
End: 2020-03-13
Payer: COMMERCIAL

## 2020-03-13 VITALS
OXYGEN SATURATION: 96 % | WEIGHT: 178 LBS | HEIGHT: 63 IN | HEART RATE: 64 BPM | TEMPERATURE: 98.8 F | RESPIRATION RATE: 16 BRPM | SYSTOLIC BLOOD PRESSURE: 122 MMHG | DIASTOLIC BLOOD PRESSURE: 78 MMHG | BODY MASS INDEX: 31.54 KG/M2

## 2020-03-13 DIAGNOSIS — E78.5 DYSLIPIDEMIA: ICD-10-CM

## 2020-03-13 DIAGNOSIS — I10 ESSENTIAL HYPERTENSION: ICD-10-CM

## 2020-03-13 DIAGNOSIS — R87.810 CERVICAL HIGH RISK HPV (HUMAN PAPILLOMAVIRUS) TEST POSITIVE: ICD-10-CM

## 2020-03-13 PROCEDURE — 99214 OFFICE O/P EST MOD 30 MIN: CPT | Performed by: FAMILY MEDICINE

## 2020-03-13 RX ORDER — METOPROLOL SUCCINATE 25 MG/1
25 TABLET, EXTENDED RELEASE ORAL DAILY
Qty: 90 TAB | Refills: 3 | Status: SHIPPED | OUTPATIENT
Start: 2020-03-13 | End: 2021-05-21

## 2020-03-13 RX ORDER — ATORVASTATIN CALCIUM 20 MG/1
20 TABLET, FILM COATED ORAL EVERY EVENING
Qty: 90 TAB | Refills: 3 | Status: SHIPPED | OUTPATIENT
Start: 2020-03-13 | End: 2021-05-10

## 2020-03-13 SDOH — HEALTH STABILITY: MENTAL HEALTH: HOW OFTEN DO YOU HAVE 6 OR MORE DRINKS ON ONE OCCASION?: NEVER

## 2020-03-13 SDOH — HEALTH STABILITY: MENTAL HEALTH: HOW OFTEN DO YOU HAVE A DRINK CONTAINING ALCOHOL?: MONTHLY OR LESS

## 2020-03-13 SDOH — HEALTH STABILITY: MENTAL HEALTH: HOW MANY STANDARD DRINKS CONTAINING ALCOHOL DO YOU HAVE ON A TYPICAL DAY?: 1 OR 2

## 2020-03-13 ASSESSMENT — PATIENT HEALTH QUESTIONNAIRE - PHQ9: CLINICAL INTERPRETATION OF PHQ2 SCORE: 0

## 2020-03-13 ASSESSMENT — FIBROSIS 4 INDEX: FIB4 SCORE: 1.423024947075770699

## 2020-03-13 NOTE — ASSESSMENT & PLAN NOTE
This is a chronic problem.  The patient has a history of hyperlipidemia currently on atorvastatin 20 mg daily.  Last lipid panel was checked October 2018 with acceptable values.  She is requesting a refill for this medicine today.  She is reinforcing lifestyle modification.

## 2020-03-13 NOTE — ASSESSMENT & PLAN NOTE
Stable. Monitoring BP at home. Currently taking metoprolol 25mg daily as directed.  Denies lightheadedness, vision changes, headache, palpitations or leg swelling.

## 2020-03-14 NOTE — ASSESSMENT & PLAN NOTE
This is a chronic problem.  The patient presents today reporting that her Pap smear was abnormal back in October 2019.  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  She had tested positive the same way 1 year prior.  As such, previous PCP Dr. Sheffield had referred to gynecology but the patient did not follow-up.  She is requesting new referral today.

## 2020-03-14 NOTE — PROGRESS NOTES
Subjective:     Chief Complaint   Patient presents with   • Establish Care     Prior PCP unknown        HPI:   Nila presents today to discuss the following.  Prior PCP: Dr. Sheffield.    Hypertension  Stable. Monitoring BP at home. Currently taking metoprolol 25mg daily as directed.  Denies lightheadedness, vision changes, headache, palpitations or leg swelling.      Dyslipidemia  This is a chronic problem.  The patient has a history of hyperlipidemia currently on atorvastatin 20 mg daily.  Last lipid panel was checked October 2018 with acceptable values.  She is requesting a refill for this medicine today.  She is reinforcing lifestyle modification.    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  The patient presents today reporting that her Pap smear was abnormal back in October 2019.  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  She had tested positive the same way 1 year prior.  As such, previous PCP Dr. Sheffield had referred to gynecology but the patient did not follow-up.  She is requesting new referral today.      Past Medical History:   Diagnosis Date   • Hyperlipidemia    • Hypertension        Social History     Tobacco Use   • Smoking status: Never Smoker   • Smokeless tobacco: Never Used   Substance Use Topics   • Alcohol use: Not Currently     Frequency: Monthly or less     Drinks per session: 1 or 2     Binge frequency: Never     Comment: 1 drink per month    • Drug use: No       Current Outpatient Medications Ordered in Epic   Medication Sig Dispense Refill   • metoprolol SR (TOPROL XL) 25 MG TABLET SR 24 HR Take 1 Tab by mouth every day. 90 Tab 3   • atorvastatin (LIPITOR) 20 MG Tab Take 1 Tab by mouth every evening. 90 Tab 3   • albuterol 108 (90 Base) MCG/ACT Aero Soln inhalation aerosol Inhale 2 Puffs by mouth every 6 hours as needed for Shortness of Breath. (Patient not taking:  "Reported on 1/31/2020) 8.5 g 0     No current Epic-ordered facility-administered medications on file.        Allergies:  Penicillins    Health Maintenance: Completed    ROS:  Gen: no fevers/chills, no changes in weight  Eyes: no changes in vision  ENT: no sore throat, no hearing loss, no bloody nose  Pulm: no sob, no cough  CV: no chest pain, no palpitations  GI: no nausea/vomiting, no diarrhea      Objective:     Exam:  /78 (BP Location: Left arm, Patient Position: Sitting, BP Cuff Size: Adult)   Pulse 64   Temp 37.1 °C (98.8 °F) (Temporal)   Resp 16   Ht 1.6 m (5' 3\")   Wt 80.7 kg (178 lb)   LMP  (LMP Unknown)   SpO2 96%   BMI 31.53 kg/m²  Body mass index is 31.53 kg/m².    Constitutional: Alert, no distress, well-groomed.  Skin: Warm, dry, good turgor, no rashes in visible areas.  Eye: Equal, round and reactive, conjunctiva clear, lids normal.  ENMT: Lips without lesions, good dentition, moist mucous membranes.  Neck: Trachea midline, no masses, no thyromegaly.  Respiratory: Unlabored respiratory effort, no cough.  MSK: Normal gait, moves all extremities.  Neuro: Grossly non-focal.   Psych: Alert and oriented x3, normal affect and mood.    Assessment & Plan:     64 y.o. female with the following -     1. 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  The patient is noted to have persistent positivity for high risk HPV for the second time with normal Pap smear.  She is a candidate for colposcopy.  I explained to the patient the meaning of this diagnosis and she is amenable to proceeding with gynecology referral and procedure.  - REFERRAL TO GYNECOLOGY    2. Essential hypertension  This is a chronic, stable condition.  The patient has a history of essential hypertension well-controlled with metoprolol 25 mg daily.  Blood pressure in the office today is 122/78.  Refill is requested today.  - metoprolol SR (TOPROL XL) 25 MG TABLET SR 24 HR; Take 1 " Tab by mouth every day.  Dispense: 90 Tab; Refill: 3    3. Dyslipidemia  This is a chronic, stable condition.  The patient has a history of dyslipidemia controlled with Lipitor 20 mg daily.  Last cholesterol panel was checked October 2019 and it was within acceptable limits.  She is requesting a refill for Lipitor today.  She is actively reinforcing lifestyle modification by walking on a daily basis and is also try and eat healthier.  We plan on repeating lipid panel October 2020.  We may have to go up on her current dose if it is not optimally controlled by then.  The patient is agreeable to plan.  - atorvastatin (LIPITOR) 20 MG Tab; Take 1 Tab by mouth every evening.  Dispense: 90 Tab; Refill: 3      Return in about 3 months (around 6/13/2020) for FU on HPV infection .    Please note that this dictation was created using voice recognition software. I have made every reasonable attempt to correct obvious errors, but I expect that there are errors of grammar and possibly content that I did not discover before finalizing the note.

## 2020-03-19 ENCOUNTER — TELEPHONE (OUTPATIENT)
Dept: HEALTH INFORMATION MANAGEMENT | Facility: OTHER | Age: 65
End: 2020-03-19

## 2020-03-19 NOTE — TELEPHONE ENCOUNTER
1. Caller Name: Yi                       Call Back Number:   [facility] PCP or Specialty Provider: Yes John Lockett        2.  Does patient have any active symptoms of respiratory illness (fever OR cough OR shortness of breath)? No.    3.  Does patient have any comoribidities? None     4.  In the last 30 days, has the patient traveled outside of the country OR in a high risk area within the [place] OR have any known contact with someone who has or is suspected to have COVID-19?  No.    5. Disposition: Cleared by RN Triage; OK to keep/schedule appointment    Note routed to PCP: FYI only.

## 2020-04-03 ENCOUNTER — OFFICE VISIT (OUTPATIENT)
Dept: URGENT CARE | Facility: PHYSICIAN GROUP | Age: 65
End: 2020-04-03
Payer: COMMERCIAL

## 2020-04-03 ENCOUNTER — HOSPITAL ENCOUNTER (OUTPATIENT)
Facility: MEDICAL CENTER | Age: 65
End: 2020-04-03
Attending: PHYSICIAN ASSISTANT
Payer: COMMERCIAL

## 2020-04-03 VITALS
HEART RATE: 61 BPM | RESPIRATION RATE: 14 BRPM | WEIGHT: 173 LBS | TEMPERATURE: 97.1 F | HEIGHT: 63 IN | SYSTOLIC BLOOD PRESSURE: 144 MMHG | DIASTOLIC BLOOD PRESSURE: 84 MMHG | OXYGEN SATURATION: 99 % | BODY MASS INDEX: 30.65 KG/M2

## 2020-04-03 DIAGNOSIS — R30.0 DYSURIA: ICD-10-CM

## 2020-04-03 PROCEDURE — 87510 GARDNER VAG DNA DIR PROBE: CPT

## 2020-04-03 PROCEDURE — 87186 SC STD MICRODIL/AGAR DIL: CPT

## 2020-04-03 PROCEDURE — 87480 CANDIDA DNA DIR PROBE: CPT

## 2020-04-03 PROCEDURE — 87077 CULTURE AEROBIC IDENTIFY: CPT

## 2020-04-03 PROCEDURE — 87660 TRICHOMONAS VAGIN DIR PROBE: CPT

## 2020-04-03 PROCEDURE — 99214 OFFICE O/P EST MOD 30 MIN: CPT | Performed by: PHYSICIAN ASSISTANT

## 2020-04-03 PROCEDURE — 87086 URINE CULTURE/COLONY COUNT: CPT

## 2020-04-03 RX ORDER — SULFAMETHOXAZOLE AND TRIMETHOPRIM 800; 160 MG/1; MG/1
1 TABLET ORAL EVERY 12 HOURS
Qty: 14 TAB | Refills: 0 | Status: SHIPPED | OUTPATIENT
Start: 2020-04-03 | End: 2020-04-10

## 2020-04-03 ASSESSMENT — ENCOUNTER SYMPTOMS
HEARTBURN: 0
VOMITING: 0
FEVER: 0
NAUSEA: 1
CONSTIPATION: 0
DIARRHEA: 0
CHILLS: 0
FLANK PAIN: 1
ABDOMINAL PAIN: 1
BLOOD IN STOOL: 0
BACK PAIN: 1

## 2020-04-03 ASSESSMENT — FIBROSIS 4 INDEX: FIB4 SCORE: 1.423024947075770699

## 2020-04-03 NOTE — PROGRESS NOTES
Subjective:   Nila Quispe is a 64 y.o. female who presents today with   Chief Complaint   Patient presents with   • Abdominal Pain     dysuria x 3 days       Dysuria    This is a new problem. Episode onset: 3 days. The problem occurs every urination. The problem has been gradually worsening. The quality of the pain is described as burning. The pain is mild. There has been no fever. Associated symptoms include flank pain, frequency, nausea and urgency. Pertinent negatives include no chills, hematuria or vomiting. Treatments tried: AZO. The treatment provided no relief.       PMH:  has a past medical history of Hyperlipidemia and Hypertension.  MEDS:   Current Outpatient Medications:   •  Phenazopyridine HCl (AZO TABS PO), Take  by mouth., Disp: , Rfl:   •  sulfamethoxazole-trimethoprim (BACTRIM DS) 800-160 MG tablet, Take 1 Tab by mouth every 12 hours for 7 days., Disp: 14 Tab, Rfl: 0  •  metoprolol SR (TOPROL XL) 25 MG TABLET SR 24 HR, Take 1 Tab by mouth every day., Disp: 90 Tab, Rfl: 3  •  atorvastatin (LIPITOR) 20 MG Tab, Take 1 Tab by mouth every evening., Disp: 90 Tab, Rfl: 3  ALLERGIES:   Allergies   Allergen Reactions   • Penicillins Hives     SURGHX:   Past Surgical History:   Procedure Laterality Date   • CHOLECYSTECTOMY  2009   • BREAST BIOPSY     • PRIMARY C SECTION      3 times, last one in 1986   • US-NEEDLE CORE BX-BREAST PANEL Left      SOCHX:  reports that she has never smoked. She has never used smokeless tobacco. She reports previous alcohol use. She reports that she does not use drugs.  FH: Reviewed with patient, not pertinent to this visit.       Review of Systems   Constitutional: Negative for chills and fever.   Gastrointestinal: Positive for abdominal pain and nausea. Negative for blood in stool, constipation, diarrhea, heartburn, melena and vomiting.   Genitourinary: Positive for dysuria, flank pain, frequency and urgency. Negative for hematuria.   Musculoskeletal: Positive  "for back pain.   All other systems reviewed and are negative.       Objective:   /84   Pulse 61   Temp 36.2 °C (97.1 °F)   Resp 14   Ht 1.6 m (5' 3\")   Wt 78.5 kg (173 lb)   LMP  (LMP Unknown)   SpO2 99%   BMI 30.65 kg/m²   Physical Exam  Vitals signs and nursing note reviewed.   Constitutional:       General: She is not in acute distress.     Appearance: She is well-developed.   HENT:      Head: Normocephalic and atraumatic.      Right Ear: Hearing normal.      Left Ear: Hearing normal.   Eyes:      Pupils: Pupils are equal, round, and reactive to light.   Cardiovascular:      Rate and Rhythm: Normal rate and regular rhythm.      Heart sounds: Normal heart sounds.   Pulmonary:      Effort: Pulmonary effort is normal.      Breath sounds: Normal breath sounds.   Abdominal:      Tenderness: There is abdominal tenderness in the suprapubic area. There is right CVA tenderness and left CVA tenderness.   Genitourinary:     Comments: Patient deferred  exam  Musculoskeletal:      Comments: Normal movement in all 4 extremities   Skin:     General: Skin is warm and dry.   Neurological:      Mental Status: She is alert.      Coordination: Coordination normal.   Psychiatric:         Mood and Affect: Mood normal.       UA consistent with UTI    Assessment/Plan:   Assessment    1. Dysuria  - POCT Urinalysis  - Urine Culture; Future  - VAGINAL PATHOGENS DNA PANEL; Future  - sulfamethoxazole-trimethoprim (BACTRIM DS) 800-160 MG tablet; Take 1 Tab by mouth every 12 hours for 7 days.  Dispense: 14 Tab; Refill: 0    Other orders  - Phenazopyridine HCl (AZO TABS PO); Take  by mouth.  Will follow up with tests and treat accordingly at that time.   Differential diagnosis, natural history, supportive care, and indications for immediate follow-up discussed.   Patient given instructions and understanding of medications and treatment.    If not improving in 3-5 days, F/U with PCP or return to UC if symptoms worsen.    Patient " agreeable to plan.      Please note that this dictation was created using voice recognition software. I have made every reasonable attempt to correct obvious errors, but I expect that there are errors of grammar and possibly content that I did not discover before finalizing the note.    Shay Gonzalez PA-C

## 2020-04-04 LAB
CANDIDA DNA VAG QL PROBE+SIG AMP: NEGATIVE
G VAGINALIS DNA VAG QL PROBE+SIG AMP: NEGATIVE
T VAGINALIS DNA VAG QL PROBE+SIG AMP: NEGATIVE

## 2020-04-06 ENCOUNTER — TELEPHONE (OUTPATIENT)
Dept: URGENT CARE | Facility: CLINIC | Age: 65
End: 2020-04-06

## 2020-04-06 LAB
BACTERIA UR CULT: ABNORMAL
BACTERIA UR CULT: ABNORMAL
SIGNIFICANT IND 70042: ABNORMAL
SITE SITE: ABNORMAL
SOURCE SOURCE: ABNORMAL

## 2020-04-06 NOTE — TELEPHONE ENCOUNTER
Called and discussed results with patient at this time.  She is understanding and appreciative of my call.

## 2020-06-18 ENCOUNTER — OFFICE VISIT (OUTPATIENT)
Dept: MEDICAL GROUP | Facility: PHYSICIAN GROUP | Age: 65
End: 2020-06-18
Payer: COMMERCIAL

## 2020-06-18 VITALS
HEART RATE: 90 BPM | BODY MASS INDEX: 32.92 KG/M2 | OXYGEN SATURATION: 97 % | SYSTOLIC BLOOD PRESSURE: 138 MMHG | HEIGHT: 63 IN | RESPIRATION RATE: 16 BRPM | TEMPERATURE: 98.5 F | DIASTOLIC BLOOD PRESSURE: 80 MMHG | WEIGHT: 185.8 LBS

## 2020-06-18 DIAGNOSIS — E78.5 DYSLIPIDEMIA: ICD-10-CM

## 2020-06-18 DIAGNOSIS — M25.561 ACUTE PAIN OF RIGHT KNEE: ICD-10-CM

## 2020-06-18 DIAGNOSIS — R87.810 CERVICAL HIGH RISK HUMAN PAPILLOMAVIRUS (HPV) DNA TEST POSITIVE: ICD-10-CM

## 2020-06-18 DIAGNOSIS — I10 ESSENTIAL HYPERTENSION: ICD-10-CM

## 2020-06-18 PROCEDURE — 99214 OFFICE O/P EST MOD 30 MIN: CPT | Performed by: FAMILY MEDICINE

## 2020-06-18 ASSESSMENT — FIBROSIS 4 INDEX: FIB4 SCORE: 1.423024947075770699

## 2020-06-18 NOTE — ASSESSMENT & PLAN NOTE
Stable. Monitoring BP at home. Currently taking metoprolol 25mg daily as directed. Denies lightheadedness, vision changes, headache, palpitations or leg swelling.  BP in the office today is 138/80

## 2020-06-18 NOTE — ASSESSMENT & PLAN NOTE
This is a chronic problem.  The patient has a hx of abnormal back in 10/19  The patient had high risk HPV for the second time. Colposcopy indicated.  Hasnt connected with GYN due to covid-19

## 2020-06-18 NOTE — ASSESSMENT & PLAN NOTE
This is a chronic problem  The patient has HLD and has been on atorvastatin 20mg daily.   Cholesterol values have been well controlled with this regimen   Last lipid panel 10/19

## 2020-06-18 NOTE — ASSESSMENT & PLAN NOTE
This is a new problem  The patient began to develop right knee pain extending down her leg. She feels like her right foot is burning.   No trauma  Overall, it is getting better  She has been taking TENS unit which helps and ice  Also takes APAP.  Patient mentions that her knee was initially swollen down her extremity but this is doing much better now.  Denies any shortness of breath or chest pain.

## 2020-06-19 ENCOUNTER — HOSPITAL ENCOUNTER (OUTPATIENT)
Dept: LAB | Facility: MEDICAL CENTER | Age: 65
End: 2020-06-19
Attending: FAMILY MEDICINE
Payer: COMMERCIAL

## 2020-06-19 ENCOUNTER — HOSPITAL ENCOUNTER (OUTPATIENT)
Dept: RADIOLOGY | Facility: MEDICAL CENTER | Age: 65
End: 2020-06-19
Attending: FAMILY MEDICINE
Payer: COMMERCIAL

## 2020-06-19 ENCOUNTER — TELEPHONE (OUTPATIENT)
Dept: MEDICAL GROUP | Facility: PHYSICIAN GROUP | Age: 65
End: 2020-06-19

## 2020-06-19 DIAGNOSIS — M25.561 ACUTE PAIN OF RIGHT KNEE: ICD-10-CM

## 2020-06-19 PROCEDURE — 36415 COLL VENOUS BLD VENIPUNCTURE: CPT

## 2020-06-19 PROCEDURE — 93971 EXTREMITY STUDY: CPT | Mod: RT

## 2020-06-19 PROCEDURE — 73562 X-RAY EXAM OF KNEE 3: CPT | Mod: RT

## 2020-06-19 PROCEDURE — 84550 ASSAY OF BLOOD/URIC ACID: CPT

## 2020-06-19 PROCEDURE — 93971 EXTREMITY STUDY: CPT | Mod: 26,RT | Performed by: INTERNAL MEDICINE

## 2020-06-19 NOTE — TELEPHONE ENCOUNTER
1. Caller Name: Chetna CALDERON                        Call Back Number: [phone]    Imaging states patient is negative for DVT and is getting her xray done now.

## 2020-06-20 LAB — URATE SERPL-MCNC: 5.1 MG/DL (ref 1.9–8.2)

## 2020-06-22 ENCOUNTER — TELEPHONE (OUTPATIENT)
Dept: MEDICAL GROUP | Facility: PHYSICIAN GROUP | Age: 65
End: 2020-06-22

## 2020-06-22 NOTE — TELEPHONE ENCOUNTER
----- Message from John Lockett M.D. sent at 6/22/2020  9:08 AM PDT -----  Please call patient to let know:    Kyrgyz-speaking    Her knee x-ray of the right is normal.  No mention of arthritis    Thank you,    Dr. AARON Lockett  Family Medicine Physician  [facility]  [phone]

## 2020-06-22 NOTE — TELEPHONE ENCOUNTER
Phone Number Called: [phone] (home)     Call outcome: Spoke to patient regarding message below.    Message: Let her know all results came back normal - US, Xray and uric acid. Said she understood, no questions at this time.

## 2020-07-16 ENCOUNTER — OFFICE VISIT (OUTPATIENT)
Dept: URGENT CARE | Facility: PHYSICIAN GROUP | Age: 65
End: 2020-07-16
Payer: COMMERCIAL

## 2020-07-16 ENCOUNTER — HOSPITAL ENCOUNTER (OUTPATIENT)
Dept: RADIOLOGY | Facility: MEDICAL CENTER | Age: 65
End: 2020-07-16
Attending: FAMILY MEDICINE | Admitting: FAMILY MEDICINE
Payer: COMMERCIAL

## 2020-07-16 VITALS
BODY MASS INDEX: 32.96 KG/M2 | RESPIRATION RATE: 16 BRPM | OXYGEN SATURATION: 97 % | SYSTOLIC BLOOD PRESSURE: 108 MMHG | TEMPERATURE: 97.3 F | HEART RATE: 71 BPM | HEIGHT: 63 IN | DIASTOLIC BLOOD PRESSURE: 72 MMHG | WEIGHT: 186 LBS

## 2020-07-16 DIAGNOSIS — M54.16 LUMBAR RADICULOPATHY: ICD-10-CM

## 2020-07-16 PROCEDURE — 72148 MRI LUMBAR SPINE W/O DYE: CPT

## 2020-07-16 PROCEDURE — 99214 OFFICE O/P EST MOD 30 MIN: CPT | Performed by: FAMILY MEDICINE

## 2020-07-16 RX ORDER — CYCLOBENZAPRINE HCL 5 MG
5 TABLET ORAL 3 TIMES DAILY PRN
Qty: 30 TAB | Refills: 0 | Status: SHIPPED | OUTPATIENT
Start: 2020-07-16 | End: 2020-07-23

## 2020-07-16 ASSESSMENT — FIBROSIS 4 INDEX: FIB4 SCORE: 1.423024947075770699

## 2020-07-16 ASSESSMENT — PAIN SCALES - GENERAL: PAINLEVEL: 5=MODERATE PAIN

## 2020-07-16 NOTE — PROGRESS NOTES
"Chief Complaint   Patient presents with   • Leg Pain     L leg pain, x1 week            This is a new problem. The current episode started in the past 7 days. The problem occurs constantly. The problem is unchanged. The pain is present in the left lower back and left buttock area        . The quality of the pain is described as dull. The pain does radiate down into left buttock and into left leg. The pain is moderate. The symptoms are aggravated by position. Pertinent negatives include no bladder incontinence, bowel incontinence, dysuria, fever, headaches,  or weakness.  also has some left foot tingling/numbness.   Treatments tried: motrin.  The treatment provided no relief.      Family hx was reviewed - no pertinent past family hx         Past Medical History:   Diagnosis Date   • Hyperlipidemia    • Hypertension          Social History     Tobacco Use   • Smoking status: Never Smoker   • Smokeless tobacco: Never Used   Substance Use Topics   • Alcohol use: Not Currently     Frequency: Monthly or less     Drinks per session: 1 or 2     Binge frequency: Never     Comment: 1 drink per month    • Drug use: No             Review of Systems   Constitutional: Negative for fever, chills and malaise/fatigue.   Eyes: Negative for vision changes, d/c.    Respiratory: Negative for cough and sputum production.    Cardiovascular: Negative for chest pain and palpitations.   Gastrointestinal: Negative for nausea, vomiting, abdominal pain, diarrhea and constipation.   Genitourinary: Negative for dysuria, urgency and frequency.   Skin: Negative for rash or  itching.   Neurological: Negative for dizziness and headaches.   Psychiatric/Behavioral: Negative for depression.   Hematologic/lymphatic - denies bruising or excessive bleeding  All other systems reviewed and are negative.         Objective:     /72   Pulse 71   Temp 36.3 °C (97.3 °F) (Temporal)   Resp 16   Ht 1.6 m (5' 3\")   Wt 84.4 kg (186 lb)   SpO2 97% "       Physical Exam   Constitutional: pt is oriented to person, place, and time. Pt appears well-developed and well-nourished. No distress.   HENT:   Head: Normocephalic and atraumatic.   Eyes: EOM are normal. Pupils are equal, round, and reactive to light. No scleral icterus.   Neck: Normal range of motion. Neck supple. No thyromegaly present.   Cardiovascular: Normal rate, regular rhythm and normal heart sounds.    Pulmonary/Chest: Effort normal and breath sounds normal. No respiratory distress.  no wheezes.   no rales.  no tenderness.   Musculoskeletal: pt exhibits no edema.                  Lumbar spine: pt exhibits decreased range of motion, spasm and left sided tenderness . Pt exhibits no bony tenderness, no swelling, no edema, no deformity  .   Neurological: patient is alert and oriented to person, place, and time. Patient has normal reflexes. No cranial nerve deficit. Patient exhibits normal muscle tone.      STRAIGHT LEG RAISE POSITIVE ON LEFT   Quad, calf, ant tib strength 5/5 on left  Skin: Skin is warm and dry. No rash noted. No erythema.   Psychiatric: patient has a normal mood and affect.  behavior is normal.   Nursing note and vitals reviewed.               Assessment/Plan:       1. Lumbar radiculopathy     - Diclofenac Sodium (VOLTAREN) 1 % Gel; Apply 4 g to skin as directed 3 times a day as needed.  Dispense: 40 g; Refill: 0  - cyclobenzaprine (FLEXERIL) 5 MG tablet; Take 1 Tab by mouth 3 times a day as needed.  Dispense: 30 Tab; Refill: 0  - MR-LUMBAR SPINE-W/O; Future         Follow up in one week if no improvement, sooner if symptoms worsen.

## 2020-07-21 ENCOUNTER — TELEPHONE (OUTPATIENT)
Dept: URGENT CARE | Facility: PHYSICIAN GROUP | Age: 65
End: 2020-07-21

## 2020-07-22 ENCOUNTER — TELEPHONE (OUTPATIENT)
Dept: URGENT CARE | Facility: PHYSICIAN GROUP | Age: 65
End: 2020-07-22

## 2020-07-22 NOTE — TELEPHONE ENCOUNTER
----- Message from Oneal Young M.D. sent at 7/17/2020  9:29 AM PDT -----  Very small bulging disk at L5.    This is likely the cause of her symptoms.       She has been prescribed mediation for the pain.        Possible treatment includes:  Physical therapy, epidural steroid injection.    Sometimes resolves over time as well.    She can f/u with her PCP regarding this.

## 2020-07-23 ENCOUNTER — OFFICE VISIT (OUTPATIENT)
Dept: MEDICAL GROUP | Facility: PHYSICIAN GROUP | Age: 65
End: 2020-07-23
Payer: COMMERCIAL

## 2020-07-23 VITALS
HEART RATE: 88 BPM | HEIGHT: 63 IN | DIASTOLIC BLOOD PRESSURE: 84 MMHG | BODY MASS INDEX: 33.06 KG/M2 | WEIGHT: 186.6 LBS | OXYGEN SATURATION: 93 % | RESPIRATION RATE: 16 BRPM | SYSTOLIC BLOOD PRESSURE: 130 MMHG | TEMPERATURE: 98.6 F

## 2020-07-23 DIAGNOSIS — M51.26 HERNIATION OF LEFT SIDE OF L4-L5 INTERVERTEBRAL DISC: ICD-10-CM

## 2020-07-23 DIAGNOSIS — M54.42 ACUTE LEFT-SIDED LOW BACK PAIN WITH LEFT-SIDED SCIATICA: ICD-10-CM

## 2020-07-23 PROBLEM — M54.40 ACUTE LEFT-SIDED LOW BACK PAIN WITH SCIATICA: Status: ACTIVE | Noted: 2020-07-23

## 2020-07-23 PROCEDURE — 99214 OFFICE O/P EST MOD 30 MIN: CPT | Performed by: FAMILY MEDICINE

## 2020-07-23 RX ORDER — MELOXICAM 7.5 MG/1
7.5 TABLET ORAL DAILY
Qty: 14 TAB | Refills: 0 | Status: SHIPPED | OUTPATIENT
Start: 2020-07-23 | End: 2020-08-06

## 2020-07-23 RX ORDER — CYCLOBENZAPRINE HCL 10 MG
10 TABLET ORAL 3 TIMES DAILY PRN
Qty: 30 TAB | Refills: 0 | Status: SHIPPED | OUTPATIENT
Start: 2020-07-23 | End: 2021-04-04

## 2020-07-23 ASSESSMENT — FIBROSIS 4 INDEX: FIB4 SCORE: 1.423024947075770699

## 2020-07-23 NOTE — LETTER
July 23, 2020        Nila Rodríguez Brittaney  [address] [facility] [place] [address]        To whom it may concern:    Nila should not be lifting or pushing heavy items heavier than 50lbs due to her acute back condition. Okay to resume regular activities 6 weeks from today.     Sincerely,        John Lockett M.D.    Electronically Signed

## 2020-07-23 NOTE — PROGRESS NOTES
Subjective:     Chief Complaint   Patient presents with   • Follow-Up      back pain not better        HPI:   Nila presents today to discuss the following.    Acute left-sided low back pain with sciatica  This is an established problem  Symptom onset: Started developing left sciatica symptoms 1 week ago  Current symptoms: Continues to experience the pain  Since onset symptoms are: Unchanged  Modifying factors: She has a hx of low back injury in 2008 due to a fall. No recent injury. Went to  2 days ago and had MRI which demonstrated mild L5 disk bulging with minimal narrowing.   Treatments tried: Has been taking flexeril   Associated symptoms: Denies any        Past Medical History:   Diagnosis Date   • Hyperlipidemia    • Hypertension        Social History     Tobacco Use   • Smoking status: Never Smoker   • Smokeless tobacco: Never Used   Substance Use Topics   • Alcohol use: Not Currently     Frequency: Monthly or less     Drinks per session: 1 or 2     Binge frequency: Never     Comment: 1 drink per month    • Drug use: No       Current Outpatient Medications Ordered in Epic   Medication Sig Dispense Refill   • cyclobenzaprine (FLEXERIL) 10 MG Tab Take 1 Tab by mouth 3 times a day as needed. 30 Tab 0   • meloxicam (MOBIC) 7.5 MG Tab Take 1 Tab by mouth every day for 14 days. 14 Tab 0   • Phenazopyridine HCl (AZO TABS PO) Take  by mouth.     • metoprolol SR (TOPROL XL) 25 MG TABLET SR 24 HR Take 1 Tab by mouth every day. 90 Tab 3   • atorvastatin (LIPITOR) 20 MG Tab Take 1 Tab by mouth every evening. 90 Tab 3     No current Epic-ordered facility-administered medications on file.        Allergies:  Penicillins    Health Maintenance: Completed    ROS:  Gen: no fevers/chills, no changes in weight  Eyes: no changes in vision  ENT: no sore throat, no hearing loss, no bloody nose  Pulm: no sob, no cough  CV: no chest pain, no palpitations      Objective:     Exam:  /84 (BP Location: Left arm, Patient  "Position: Sitting, BP Cuff Size: Adult)   Pulse 88   Temp 37 °C (98.6 °F) (Temporal)   Resp 16   Ht 1.6 m (5' 3\")   Wt 84.6 kg (186 lb 9.6 oz)   LMP  (LMP Unknown)   SpO2 93%   BMI 33.05 kg/m²  Body mass index is 33.05 kg/m².    Constitutional: Alert, no distress, well-groomed.  Skin: Warm, dry, good turgor, no rashes in visible areas.  Eye: Equal, round and reactive, conjunctiva clear, lids normal.  ENMT: Lips without lesions, good dentition, moist mucous membranes.  Neck: Trachea midline, no masses, no thyromegaly.  Respiratory: Unlabored respiratory effort, no cough.  MSK: Normal gait, moves all extremities.  Neuro: Grossly non-focal.   Psych: Alert and oriented x3, normal affect and mood.        Assessment & Plan:     64 y.o. female with the following -     1. Acute left-sided low back pain with left-sided sciatica  This is an established, worsening condition.  Patient has been experiencing worsening left-sided lumbar back pain with sciatica over the past week.  She had to go to urgent care for this and was given low-dose Flexeril.  MRI was also obtained which did demonstrate L5 disc herniation.  She mentions that the Flexeril is too low dose and does not bring her full relief.  At this time I will refer to a spine specialist and refer to physical therapy.  We will increase the Flexeril dose to 10 mg as needed and will also add meloxicam.  Return precautions were given today.  - cyclobenzaprine (FLEXERIL) 10 MG Tab; Take 1 Tab by mouth 3 times a day as needed.  Dispense: 30 Tab; Refill: 0  - meloxicam (MOBIC) 7.5 MG Tab; Take 1 Tab by mouth every day for 14 days.  Dispense: 14 Tab; Refill: 0  - REFERRAL TO PHYSICAL THERAPY Reason for Therapy: Eval/Treat/Report  - REFERRAL TO SPINE SURGERY  [facility]    2. Herniation of left side of L4-L5 intervertebral disc  This is a new problem.  MRI completed on 7/16/2020 demonstrated an annular disc bulge localized to L5.  No other acute findings were visualized on MRI.  " However, given this finding I will refer to spine specialist.  We will do a trial of physical therapy as stated above.  We will try to achieve pain control with meloxicam.  She denies any cauda equina symptomatology.      Return if symptoms worsen or fail to improve.    Please note that this dictation was created using voice recognition software. I have made every reasonable attempt to correct obvious errors, but I expect that there are errors of grammar and possibly content that I did not discover before finalizing the note.

## 2020-07-23 NOTE — ASSESSMENT & PLAN NOTE
This is an established problem  Symptom onset: Started developing left sciatica symptoms 1 week ago  Current symptoms: Continues to experience the pain  Since onset symptoms are: Unchanged  Modifying factors: She has a hx of low back injury in 2008 due to a fall. No recent injury. Went to UC 2 days ago and had MRI which demonstrated mild L5 disk bulging with minimal narrowing.   Treatments tried: Has been taking flexeril   Associated symptoms: Denies any

## 2020-07-30 ENCOUNTER — TELEPHONE (OUTPATIENT)
Dept: URGENT CARE | Facility: PHYSICIAN GROUP | Age: 65
End: 2020-07-30

## 2020-08-02 ENCOUNTER — TELEPHONE (OUTPATIENT)
Dept: URGENT CARE | Facility: PHYSICIAN GROUP | Age: 65
End: 2020-08-02

## 2020-09-15 ENCOUNTER — PHYSICAL THERAPY (OUTPATIENT)
Dept: PHYSICAL THERAPY | Facility: REHABILITATION | Age: 65
End: 2020-09-15
Attending: FAMILY MEDICINE
Payer: COMMERCIAL

## 2020-09-15 DIAGNOSIS — M54.42 ACUTE LEFT-SIDED LOW BACK PAIN WITH LEFT-SIDED SCIATICA: ICD-10-CM

## 2020-09-15 DIAGNOSIS — M53.86 LOW BACK DERANGEMENT SYNDROME: ICD-10-CM

## 2020-09-15 PROCEDURE — 97161 PT EVAL LOW COMPLEX 20 MIN: CPT

## 2020-09-15 PROCEDURE — 97110 THERAPEUTIC EXERCISES: CPT

## 2020-09-15 PROCEDURE — 97014 ELECTRIC STIMULATION THERAPY: CPT

## 2020-09-15 ASSESSMENT — ENCOUNTER SYMPTOMS
PAIN SCALE AT HIGHEST: 10
PAIN SCALE AT LOWEST: 5
PAIN SCALE: 7
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
## 2020-09-17 ENCOUNTER — PHYSICAL THERAPY (OUTPATIENT)
Dept: PHYSICAL THERAPY | Facility: REHABILITATION | Age: 65
End: 2020-09-17
Attending: FAMILY MEDICINE
Payer: COMMERCIAL

## 2020-09-17 DIAGNOSIS — M54.42 ACUTE LEFT-SIDED LOW BACK PAIN WITH LEFT-SIDED SCIATICA: ICD-10-CM

## 2020-09-17 DIAGNOSIS — M53.86 LOW BACK DERANGEMENT SYNDROME: ICD-10-CM

## 2020-09-17 PROCEDURE — 97014 ELECTRIC STIMULATION THERAPY: CPT

## 2020-09-17 PROCEDURE — 97110 THERAPEUTIC EXERCISES: CPT

## 2020-09-17 NOTE — OP THERAPY DAILY TREATMENT
Outpatient Physical Therapy  DAILY TREATMENT     [facility] Physical Therapy [address]  [address]  [place] [address]  Phone:  [phone]  Fax:  [phone]    Date: 09/17/2020    Patient: Nila Quispe  YOB: 1955  MRN: [id number]     Time Calculation    Start time: 0445  Stop time: 0515 Time Calculation (min): 30 minutes         Chief Complaint: No chief complaint on file.    Visit #: 2    SUBJECTIVE:  Epidural yesterday and a little sore with a h/a but back is better    OBJECTIVE          Therapeutic Treatments and Modalities:     Therapeutic Treatment and Modalities Summary: Core stab level I w/ BFB:   --ball in/out   --marching  [place] to l/s in ext 5/5/ x 15' mh    Time-based treatments/modalities:    Physical Therapy Timed Treatment Charges  Therapeutic exercise minutes (CPT 70605): 20 minutes      Pain rating (1-10) before treatment:  3  Pain rating (1-10) after treatment:  1    ASSESSMENT:   Limited hip dissociation but improving control    PLAN/RECOMMENDATIONS:   Core level 2

## 2020-09-22 ENCOUNTER — PHYSICAL THERAPY (OUTPATIENT)
Dept: PHYSICAL THERAPY | Facility: REHABILITATION | Age: 65
End: 2020-09-22
Attending: FAMILY MEDICINE
Payer: COMMERCIAL

## 2020-09-22 DIAGNOSIS — M54.42 ACUTE LEFT-SIDED LOW BACK PAIN WITH LEFT-SIDED SCIATICA: ICD-10-CM

## 2020-09-22 PROCEDURE — 97110 THERAPEUTIC EXERCISES: CPT

## 2020-09-22 PROCEDURE — 97012 MECHANICAL TRACTION THERAPY: CPT

## 2020-09-23 NOTE — OP THERAPY DAILY TREATMENT
"  Outpatient Physical Therapy  DAILY TREATMENT     [facility] Physical Therapy [address].  [address]  [place] [address]  Phone:  [phone]  Fax:  [phone]    Date: 09/22/2020    Patient: Nila Quispe  YOB: 1955  MRN: [id number]     Time Calculation    Start time: 0415  Stop time: 0509 Time Calculation (min): 54 minutes         Chief Complaint: No chief complaint on file.    Visit #: 3    SUBJECTIVE:  Back is more sore and still having n/t and pain into her foot and toes--patient is doing some ex but has not yet purchased a ball.    OBJECTIVE          Therapeutic Treatments and Modalities:     Therapeutic Treatment and Modalities Summary: Core stab level I w/ BFB:   --ball in/out   --marching  REIl with L hip flexion//abolished n/t and centralized pain to above the knee--returned to foot with axial loading  URMILA against wall with L hip flexion  Ball bridges x 30-40\" x 6//no more n/t but still having pain in the foot  Mechanical traction  80/50 x 60/20 x 15' w/ mhp    Time-based treatments/modalities:    Physical Therapy Timed Treatment Charges  Therapeutic exercise minutes (CPT 55273): 30 minutes      Pain rating (1-10) before treatment:  4/10 foot pain and n/t in to all of the toes  Pain rating (1-10) after treatment:  No foot pain or n/t    ASSESSMENT:   Limited hep and patient has not yet purchased a ball--centralized with ext protocol but peripheralizes with axial loading    PLAN/RECOMMENDATIONS:   Core level 2, tx     "

## 2020-09-24 ENCOUNTER — PHYSICAL THERAPY (OUTPATIENT)
Dept: PHYSICAL THERAPY | Facility: REHABILITATION | Age: 65
End: 2020-09-24
Attending: FAMILY MEDICINE
Payer: COMMERCIAL

## 2020-09-24 DIAGNOSIS — M53.86 LOW BACK DERANGEMENT SYNDROME: ICD-10-CM

## 2020-09-24 PROCEDURE — 97110 THERAPEUTIC EXERCISES: CPT

## 2020-09-24 PROCEDURE — 97012 MECHANICAL TRACTION THERAPY: CPT

## 2020-09-24 NOTE — OP THERAPY DAILY TREATMENT
"  Outpatient Physical Therapy  DAILY TREATMENT     [facility] Physical Therapy [address].  [address]  [place] [address]  Phone:  [phone]  Fax:  [phone]    Date: 09/24/2020    Patient: Nila Quispe  YOB: 1955  MRN: [id number]     Time Calculation    Start time: 0445  Stop time: 0525 Time Calculation (min): 40 minutes         Chief Complaint: No chief complaint on file.    Visit #: 4    SUBJECTIVE:  Back is more sore and still having n/t and pain into her foot and toes--no  Ball yet    OBJECTIVE          Therapeutic Treatments and Modalities:     Therapeutic Treatment and Modalities Summary: Core stab level I w/ BFB:   --ball in/out   --bridges x 5 x 20-40 \"// no change  REIl with L hip flexion//no change in foot sx  URMILA against wall--reviewed  Ball bridges x 30-40\" x 6//no more n/t but still having pain in the foot    supermans x 2 x 30-40\"// no n/t while doing ex  Mechanical traction  80/50 x 60/20 x 15' w/ mhp    Time-based treatments/modalities:    Physical Therapy Timed Treatment Charges  Therapeutic exercise minutes (CPT 56510): 23 minutes      Pain rating (1-10) before treatment:  4/10 foot pain and n/t in to all of the toes  Pain rating (1-10) after treatment:  No foot pain or n/t    ASSESSMENT:   Limited hep and patient has not yet purchased a ball--centralized with ext protocol but peripheralizes with axial loading--patient was able to abolish sx while performing  supermans but returned when she stood up    PLAN/RECOMMENDATIONS:   Core level 2, tx     "

## 2020-09-29 ENCOUNTER — APPOINTMENT (OUTPATIENT)
Dept: PHYSICAL THERAPY | Facility: REHABILITATION | Age: 65
End: 2020-09-29
Attending: FAMILY MEDICINE
Payer: COMMERCIAL

## 2020-10-01 ENCOUNTER — PHYSICAL THERAPY (OUTPATIENT)
Dept: PHYSICAL THERAPY | Facility: REHABILITATION | Age: 65
End: 2020-10-01
Attending: FAMILY MEDICINE
Payer: COMMERCIAL

## 2020-10-01 DIAGNOSIS — M53.86 LOW BACK DERANGEMENT SYNDROME: ICD-10-CM

## 2020-10-01 PROCEDURE — 97140 MANUAL THERAPY 1/> REGIONS: CPT

## 2020-10-01 PROCEDURE — 97110 THERAPEUTIC EXERCISES: CPT

## 2020-10-02 NOTE — OP THERAPY DAILY TREATMENT
"     Outpatient Physical Therapy  DAILY TREATMENT     [facility] Physical Therapy [address].  [address]  [place] [address]  Phone:  [phone]  Fax:  [phone]    Date: 10/01/2020    Patient: Nila Quispe  YOB: 1955  MRN: [id number]     Time Calculation    Start time: 0451  Stop time: 0543 Time Calculation (min): 52 minutes         Chief Complaint: No chief complaint on file.    Visit #: 5    SUBJECTIVE:  Saw the doctor two days ago and he said that she may need surgery but surgeries are not always good--patient veronica not want sx    Patient reports that her sx are worse after work and with increased sitting    OBJECTIVE:            Therapeutic Treatments and Modalities:     Therapeutic Treatment and Modalities Summary: RONNIE with and without L l.e. hip flexion//decreased leg pain  P/a l4-5 with L hip flexion gd 3-4//abolished l.e. pain and n/t  Ball bridges 4x 30-45\" // no increase in n/t  Ball roll with focus on l/s neutral position  Cupping to l/s with reil and rocking   training and how to manage peripheralization with standing--patient stood up and reproted return of n/t in foot but was able to abolish n/t with increasing l/s lordosis  Reil/seil with and without hip flexion with MHP x 5'  Discussed dry needling with risks and benefits--patient expressed interest to try next visit    Time-based treatments/modalities:    Physical Therapy Timed Treatment Charges  Manual therapy minutes (CPT 92268): 33 minutes  Therapeutic exercise minutes (CPT 04321): 20 minutes      Pain rating (1-10) before treatment:  4--buttock, leg pain and n/t into foot  Pain rating (1-10) after treatment:  1lbp and no n/t in her foot    ASSESSMENT:   Patient abolished n/t in standing with correcting psoture--able to centralize and abolish sx during treatment--mmt revealed good strength 4+/5 l3-s1 bilaterally equal    PLAN/RECOMMENDATIONS:   progress core stab, review body " mechanics and maintainin eutral spine in standing, dry needle??

## 2020-10-16 ENCOUNTER — PATIENT OUTREACH (OUTPATIENT)
Dept: HEALTH INFORMATION MANAGEMENT | Facility: OTHER | Age: 65
End: 2020-10-16

## 2020-10-21 ENCOUNTER — GYNECOLOGY VISIT (OUTPATIENT)
Dept: OBGYN | Facility: CLINIC | Age: 65
End: 2020-10-21
Payer: COMMERCIAL

## 2020-10-21 VITALS — DIASTOLIC BLOOD PRESSURE: 78 MMHG | WEIGHT: 186 LBS | BODY MASS INDEX: 32.95 KG/M2 | SYSTOLIC BLOOD PRESSURE: 142 MMHG

## 2020-10-21 DIAGNOSIS — B97.7 HIGH RISK HPV INFECTION: ICD-10-CM

## 2020-10-21 PROCEDURE — 99203 OFFICE O/P NEW LOW 30 MIN: CPT | Performed by: OBSTETRICS & GYNECOLOGY

## 2020-10-21 ASSESSMENT — FIBROSIS 4 INDEX: FIB4 SCORE: 1.423024947075770699

## 2020-10-21 NOTE — NON-PROVIDER
Pt here for new pt appt  Pt states she is here to discuss an abnormal PAP and colpo  Pharmacy verified   [phone]

## 2020-10-21 NOTE — PROGRESS NOTES
Subjective:      Nila Quispe is a 64 y.o. female who presents with New Patient (Abnormal PAP)        CC: abnormal pap smear    HPI: 64-year-old  5 para 4-0-1-4 postmenopausal female presents for referral for abnormal Pap smear.  The patient's Pap smear from 10/1/2019 returned negative for intraepithelial changes, but positive for high risk HPV infection, not 16 or 18.  She also had a similar result on 2018.  She reports no previous history of abnormal Pap smears or cervical dysplasia.  She has been menopausal since age 51.  She denies vaginal bleeding or vaginal discharge.  She has occasional left-sided pelvic pain.  She is a non-smoker.    Past Medical History:   Diagnosis Date   • Hyperlipidemia    • Hypertension        Past Surgical History:   Procedure Laterality Date   • CHOLECYSTECTOMY     • BREAST BIOPSY     • PRIMARY C SECTION      3 times, last one in    • US-NEEDLE CORE BX-BREAST PANEL Left          Current Outpatient Medications:   •  cyclobenzaprine (FLEXERIL) 10 MG Tab, Take 1 Tab by mouth 3 times a day as needed., Disp: 30 Tab, Rfl: 0  •  Phenazopyridine HCl (AZO TABS PO), Take  by mouth., Disp: , Rfl:   •  metoprolol SR (TOPROL XL) 25 MG TABLET SR 24 HR, Take 1 Tab by mouth every day., Disp: 90 Tab, Rfl: 3  •  atorvastatin (LIPITOR) 20 MG Tab, Take 1 Tab by mouth every evening., Disp: 90 Tab, Rfl: 3    Penicillins    Family History   Problem Relation Age of Onset   • Cancer Mother         Cervical Cancer   • Heart Attack Father        Social History     Socioeconomic History   • Marital status:      Spouse name: Not on file   • Number of children: Not on file   • Years of education: Not on file   • Highest education level: Not on file   Occupational History   • Not on file   Social Needs   • Financial resource strain: Not on file   • Food insecurity     Worry: Not on file     Inability: Not on file   • Transportation needs     Medical: Not on file      Non-medical: Not on file   Tobacco Use   • Smoking status: Never Smoker   • Smokeless tobacco: Never Used   Substance and Sexual Activity   • Alcohol use: Not Currently     Frequency: Monthly or less     Drinks per session: 1 or 2     Binge frequency: Never     Comment: 1 drink per month    • Drug use: No   • Sexual activity: Not Currently     Partners: Male     Birth control/protection: Post-Menopausal   Lifestyle   • Physical activity     Days per week: Not on file     Minutes per session: Not on file   • Stress: Not on file   Relationships   • Social connections     Talks on phone: Not on file     Gets together: Not on file     Attends Restorationist service: Not on file     Active member of club or organization: Not on file     Attends meetings of clubs or organizations: Not on file     Relationship status: Not on file   • Intimate partner violence     Fear of current or ex partner: Not on file     Emotionally abused: Not on file     Physically abused: Not on file     Forced sexual activity: Not on file   Other Topics Concern   • Not on file   Social History Narrative   • Not on file       OB History    Para Term  AB Living   5 2 2 0 1 4   SAB TAB Ectopic Molar Multiple Live Births   1 0 0 0 0 0       ROS REVIEW OF SYSTEMS:    Pertinent positives and negatives mentioned in HPI.    All other systems reviewed and are negative or noncontributory.       Objective:     /78   Wt 84.4 kg (186 lb)   LMP  (LMP Unknown)   BMI 32.95 kg/m²      Physical Exam      GENERAL: Alert, in no apparent distress  PSYCHIATRIC: Appropriate affect, intact insight and judgement.  NECK:  Nontender, no masses.  No Thyromegaly or nodules. No lymphadenopathy.  RESPIRATORY: Normal respiratory effort.  Lungs clear to auscultation.   CARDIOVASCULAR: RRR, no murmur, gallop, or rub.  ABDOMEN: Soft, nontender, nondistended.  No palpable masses.  No rebound or guarding.  No inguinal lymphadenopathy.  No hepatosplenomegaly.  No  hernias.  BACK: No CVA tenderness  EXTREMITIES: No edema  SKIN: No rash       Assessment/Plan:        1. High risk HPV infection  Discussed the categories of abnormal pap smears (,ASCUS, Presence of high risk HPV, Mild, Moderate, and Severe Dysplasia, Carcinoma in Situ, and Cervical Cancer).  Also discussed that the pap smear is a screening test, which needs further evaluation with colposcopy and cervical biopsies.  The procedure of colposcopy with biopsies was explained.  We also discussed that most mild dysplasia will resolve over time, but needs increased rate of surveillance with pap smears every 12 months.  Moderate to Severe cervical dysplasia generally requires further treatment in the form of a LEEP procedure, which is a larger biopsy,  which can be performed in the office or under anesthesia in the operating room.     F/U for colposcopy.  Referral placed.

## 2020-10-21 NOTE — LETTER
[facility]  [facility]  [address]     October 21, 2020    Patient: Nila Quispe   YOB: 1955   Date of Visit: 10/21/2020       To Whom It May Concern:    Nila Quispe was seen and treated in our department on 10/21/2020. Due to HIPPA regulations I will not be contacting you by phone about this patient    Sincerely,     Nila Price, Med Ass't

## 2020-11-30 ENCOUNTER — HOSPITAL ENCOUNTER (OUTPATIENT)
Facility: MEDICAL CENTER | Age: 65
End: 2020-11-30
Attending: PHYSICIAN ASSISTANT
Payer: COMMERCIAL

## 2020-11-30 ENCOUNTER — OFFICE VISIT (OUTPATIENT)
Dept: URGENT CARE | Facility: PHYSICIAN GROUP | Age: 65
End: 2020-11-30
Payer: COMMERCIAL

## 2020-11-30 VITALS
SYSTOLIC BLOOD PRESSURE: 128 MMHG | WEIGHT: 183 LBS | OXYGEN SATURATION: 97 % | BODY MASS INDEX: 32.43 KG/M2 | TEMPERATURE: 97.9 F | HEART RATE: 86 BPM | RESPIRATION RATE: 16 BRPM | HEIGHT: 63 IN | DIASTOLIC BLOOD PRESSURE: 78 MMHG

## 2020-11-30 DIAGNOSIS — R52 GENERALIZED BODY ACHES: ICD-10-CM

## 2020-11-30 DIAGNOSIS — B34.9 NONSPECIFIC SYNDROME SUGGESTIVE OF VIRAL ILLNESS: ICD-10-CM

## 2020-11-30 LAB — COVID ORDER STATUS COVID19: NORMAL

## 2020-11-30 PROCEDURE — C9803 HOPD COVID-19 SPEC COLLECT: HCPCS

## 2020-11-30 PROCEDURE — U0003 INFECTIOUS AGENT DETECTION BY NUCLEIC ACID (DNA OR RNA); SEVERE ACUTE RESPIRATORY SYNDROME CORONAVIRUS 2 (SARS-COV-2) (CORONAVIRUS DISEASE [COVID-19]), AMPLIFIED PROBE TECHNIQUE, MAKING USE OF HIGH THROUGHPUT TECHNOLOGIES AS DESCRIBED BY CMS-2020-01-R: HCPCS

## 2020-11-30 PROCEDURE — 99214 OFFICE O/P EST MOD 30 MIN: CPT | Performed by: PHYSICIAN ASSISTANT

## 2020-11-30 RX ORDER — NAPROXEN 500 MG/1
500 TABLET ORAL 2 TIMES DAILY WITH MEALS
Qty: 30 TAB | Refills: 0 | Status: SHIPPED | OUTPATIENT
Start: 2020-11-30 | End: 2021-04-04

## 2020-11-30 ASSESSMENT — ENCOUNTER SYMPTOMS
COUGH: 1
SORE THROAT: 0
VOMITING: 0
CHANGE IN BOWEL HABIT: 0
NAUSEA: 1
FEVER: 1
FATIGUE: 1
HEADACHES: 1
MYALGIAS: 1
ARTHRALGIAS: 1
BODY ACHES: 1

## 2020-11-30 ASSESSMENT — FIBROSIS 4 INDEX: FIB4 SCORE: 1.44561264464840198

## 2020-11-30 NOTE — PATIENT INSTRUCTIONS
Preguntas frecuentes sobre el COVID-19  COVID-19 Frequently Asked Questions  El COVID-19 (enfermedad por coronavirus) es chey infección causada por chey gran fidencio de virus. Algunos virus causan enfermedades en las personas y otros causan enfermedades en animales tales tri los camellos, los gatos y los murciélagos. En algunos casos, los virus que causan enfermedades en los animales pueden transmitirse a los seres humanos.  ¿De dónde provino el coronavirus?  En diciembre de 2019, [place] le informó a la [employer] (OMS) acerca de varios casos de enfermedad pulmonar (enfermedad respiratoria humana). Estos casos estaban vinculados con un saravia abierto de jerod de mar y ganado en la ciudad de [place]. El vínculo con el saravia de ganado y mariscos sugiere que el virus puede haberse propagado de los animales a los humanos. Sin embargo, desde mara primer brote en diciembre, también se ha demostrado que el virus se contagia de chey persona a otra.  ¿Cuál es el nombre de la enfermedad y del virus?  Nombre de la enfermedad  Al principio, esta enfermedad se llamó nuevo coronavirus. [place] se debe a que los científicos determinaron que la enfermedad era causada por un nuevo virus respiratorio. La [employer] (OMS) ahora ha dado a la enfermedad el nombre de COVID-19, o enfermedad por coronavirus.  Nombre del virus  El virus causante de la enfermedad se conoce tri coronavirus de tipo 2 causante del síndrome respiratorio viktoria grave (SARS-CoV-2).  Más información sobre el nombre de la enfermedad y el virus  [employer] (OMS): www.who.int/emergencies/diseases/novel-coronavirus-2019/technical-guidance/naming-the-coronavirus-disease-(covid-2019)-and-the-virus-that-causes-it  ¿Quiénes están en riesgo de sufrir complicaciones debido a la enfermedad por coronavirus?  Algunas personas pueden tener un riesgo más alto de tener complicaciones debido a la enfermedad por  coronavirus. Entre ellas se encuentran los adultos mayores y las personas que tienen enfermedades crónicas, tri enfermedad cardíaca, diabetes y enfermedad pulmonar.  Si tiene un riesgo más alto de tener complicaciones, tome estas precauciones adicionales:  · Evitar el contacto cercano con personas que estén enfermas o que tengan fiebre o tos. Permanecer al menos a chey distancia de 3 a 6 pies (1-2 m) de las otras personas, si es posible.  · Lavarse las kerline regularmente con agua y jabón debbie al menos 20 segundos.  · Evitar tocarse la honey, la boca, la nariz y los ojos.  · Tener a mano los suministros en valverde casa, tri alimentos, medicamentos y productos de limpieza.  · Permanecer en valverde casa todo lo que sea posible.  · Evitar las reuniones sociales y los viajes.  ¿Cómo se transmite la enfermedad causada por el coronavirus?  El virus que causa la enfermedad por coronavirus se transmite fácilmente de chey persona a otra (es contagioso). También hay casos de enfermedad de transmisión comunitaria. [place] significa que la enfermedad se ha propagado a:  · Personas que no tienen contacto conocido con otras personas infectadas.  · Personas que no mclaughlin viajado a zonas donde hay casos conocidos.  Aparentemente, se transmite de chey persona a otra a través de las gotitas que se despiden al toser o al estornudar.  ¿Puedo contraer al virus al tocar superficies u objetos?  Todavía hay mucho que no se conoce acerca del virus que causa la enfermedad por coronavirus. Los científicos basan gran parte de la información en lo que saben sobre virus similares, por ejemplo:  · En general, los virus no sobreviven en superficies debbie mucho tiempo. Necesitan un cuerpo humano (huésped) para sobrevivir.  · Es más probable que el virus se contagie por contacto cercano con personas que están enfermas (contacto directo), por ejemplo:  ? Al estrechar las kerline o abrazarse.  ? Al inhalar las gotitas respiratorias que se desplazan por el aire. [place]  puede ocurrir cuando chey persona infectada tose o estornuda sobre o cerca de otras personas.  · Es menos probable que el virus se propague cuando chey persona toca chey superficie o un objeto sobre el que está el virus (contacto indirecto). El virus puede ingresar al cuerpo si la persona toca chey superficie o un objeto y luego se toca la honey, los ojos, la nariz o la boca.  ¿Chey persona puede contagiar el virus sin tener síntomas de la enfermedad?  Puede ser posible que el virus se contagie antes de que la persona tenga síntomas de la enfermedad, odilia muy probablemente esta no sea la principal forma en que el virus se está propagando. Es más probable que el virus se propague al estar en contacto directo con personas que están enfermas e inhalar las gotas respiratorias que chey persona enferma despide al toser o estornudar.  ¿Cuáles son los síntomas de la enfermedad causada por el coronavirus?  Los síntomas varían de chey persona a otra y pueden variar de leves a graves. Entre los síntomas, se pueden incluir los siguientes:  · Fiebre.  · Tos.  · Cansancio, debilidad o fatiga.  · Respiración rápida o sensación de falta el aire.  Estos síntomas pueden aparecer en el término de 2 a 14 días después de milton estado expuesto al virus. Si presenta síntomas, llame al médico. Las personas con síntomas graves pueden necesitar atención hospitalaria.  Si estoy expuesto al virus, ¿cuánto tiempo tardan en aparecer los síntomas?  Los síntomas de la enfermedad por coronavirus pueden aparecer en cualquier momento en el término de 2 a 14 días después de que chey persona haya estado expuesta al virus. Si presenta síntomas, llame al médico.  ¿Dionne hacerme un análisis de detección del virus?  El médico decidirá si debe realizarse un análisis en función de estrellita síntomas, antecedentes de exposición y factores de riesgo.  ¿Cómo realiza el médico el análisis para detectar dayana virus?  Los médicos obtienen muestras para enviar a analizar. Estas  muestras pueden incluir lo siguiente:  · Bernardino con un hisopo líquido de la nariz.  · Pedirle que tosa mucosidad (esputo) para extraer líquido de los pulmones en un recipiente estéril.  · Bernardino chey muestra de reina.  · Bernardino chey muestra de heces u orina.  ¿Hay algún tratamiento o vacuna para dayana virus?  Actualmente, no existe ninguna vacuna para prevenir la enfermedad por coronavirus. Además, no existen medicamentos tri los antibióticos o los antivirales para tratar el virus. Chey persona que se enferma recibe tratamiento de apoyo, lo que significa reposo y líquidos. Chey persona también puede aliviar estrellita síntomas con medicamentos de venta neris para tratar los estornudos, la tos y el goteo nasal. Son los mismos medicamentos que se milton para el resfrío común.  Si presenta síntomas, llame al médico. Las personas con síntomas graves pueden necesitar atención hospitalaria.  ¿Qué puedo hacer para protegerme y proteger a mi fidencio de dayana virus?         Puede protegerse y proteger a valverde fidencio tomando las mismas medidas que tomaría para prevenir el contagio de otros virus. [place] las siguientes medidas:  · Lavarse las kerline regularmente con agua y jabón debbie al menos 20 segundos. Usar desinfectante para kerline con alcohol si no dispone de agua y jabón.  · Evitar tocarse la honey, la boca, la nariz y los ojos.  · Toser o estornudar en un pañuelo descartable, sobre valverde manga o codo. No toser o estornudar al aire ni cubrirse con la mano.  ? Si tose o estornuda en un pañuelo de papel, deséchelo inmediatamente y lávese las kerline.  · Desinfectar los objetos y las superficies que se tocan con frecuencia todos los días.  · Evitar el contacto cercano con personas que estén enfermas o que tengan fiebre o tos. Permanecer al menos a chey distancia de 3 a 6 pies (1-2 m) de las otras personas, si es posible.  · Quedarse en valverde casa si está enfermo, excepto para obtener atención médica. Llame al médico antes de buscar atención  médica.  · Asegúrese de tener las vacunas al día. Pregúntele al médico qué vacunas necesita.  ¿Qué geri hacer si tengo que viajar?  Siga las recomendaciones relacionadas con los viajes de la autoridad de dimitri local, los CDC y la OMS.  Información y consejos para viajeros  · Centers for Disease Control and Prevention (CDC) (Centros para el Control y la Prevención de Enfermedades): www.cdc.gov/coronavirus/2019-ncov/travelers/index.html  · [employer] [place] (OMS): www.who.int/emergencies/diseases/novel-coronavirus-2019/travel-advice  [place] los riesgos y tome medidas para proteger valverde dimitri  · El riesgo de contraer la enfermedad por coronavirus es más alto si viaja a zonas con un brote o si está en contacto con viajeros que provienen de zonas donde hay un brote.  · Lávese las kerline con frecuencia y mantenga chey higiene adecuada para reducir el riesgo de contagiarse o transmitir el virus.  ¿Qué geri hacer si estoy enfermo?  Instrucciones generales para detener la propagación de la infección  · Lavarse las kerline regularmente con agua y jabón debbie al menos 20 segundos. Usar desinfectante para kerline con alcohol si no dispone de agua y jabón.  · Toser o estornudar en un pañuelo descartable, sobre valverde manga o codo. No toser o estornudar al aire ni cubrirse con la mano.  · Si tose o estornuda en un pañuelo de papel, deséchelo inmediatamente y lávese las kerline.  · Quédese en valverde casa a menos que deba recibir atención médica. Llame al médico o a la autoridad de dimitri local antes de buscar atención médica.  · Evite las zonas públicas. No viaje en transporte público, de ser posible.  · Si puede, use un barbijo si debe salir de la casa o si está en contacto cercano con alguien que no está enfermo.  Mantenga valverde casa limpia  · Desinfecte los objetos y las superficies que se tocan con frecuencia todos los días. Pueden incluir:  ? Encimeras y mesas.  ? Picaportes e interruptores de melanie.  ? Lavabos, fregaderos y  grifos.  ? Aparatos electrónicos tales tri teléfonos, controles remotos, teclados, computadoras y tabletas.  · Lave los platos con agua jabonosa caliente o en el lavavajillas. Deje los platos para que se sequen al aire.  · Lave la ropa con [place].  Evite infectar a otros miembros de la fidencio  · Permita que los miembros de la fidencio sanos cuiden a los niños y las mascotas, si es posible. Si tiene que cuidar a los niños o las mascotas, lávese las kerline con frecuencia y use un barbijo.  · Duerma en chey habitación o cama diferentes, si es posible.  · No comparta elementos personales, tri afeitadoras, cepillos de dientes, desodorantes, peines, cepillos, toallas y toallitas de mano.  Dónde buscar más información  Centers for Disease Control and Prevention (CDC)  · Actualizaciones de información y novedades: www.cdc.gov/coronavirus/2019-ncov  [employer] [facility] [place] (OMS)  · Actualizaciones de información y novedades: www.who.int/emergencies/diseases/novel-coronavirus-2019  · [place]ha de dimitri relacionado con el coronavirus: www.who.int/health-topics/coronavirus  · Preguntas y respuestas sobre COVID-19: www.who.int/news-room/q-a-detail/l-t-jbmoqayjwctra  · Registro mundial: who.sprinklr.com  American Academy of Pediatrics (AAP) (Academia Estadounidense de Pediatría)  · Información para familias: www.healthychildren.org/English/health-issues/conditions/chest-lungs/Pages/2019-Novel-Coronavirus.aspx  La situación del coronavirus cambia rápidamente. Consulte el sitio web de valverde autoridad de dimitri local o los sitios web de los CDC y la OMS para enterarse de las novedades y noticias.  ¿Cuándo geri comunicarme con un médico?  · Comuníquese con valverde médico si tiene síntomas de infección, tri fiebre o tos, y:  ? Ha estado cerca de alguien que sabe que tiene la enfermedad por coronavirus.  ? Ha estado en contacto con chey persona que presuntamente sufra de la enfermedad por coronavirus.  ? Ha viajado fuera del  país.  ¿Cuándo geri buscar asistencia médica inmediata?  · Busque ayuda de inmediato llamando al servicio de emergencias de valverde localidad (911 en los [place]) si tiene lo siguiente:  ? Dificultad para respirar.  ? Dolor u opresión en el pecho.  ? Confusión.  ? Labios y uñas de color azulado.  ? Dificultad para despertarse.  ? Síntomas que empeoran.  Informe al personal médico de emergencias si tammie que tiene la enfermedad por coronavirus.  Resumen  · Un nuevo virus respiratorio se propaga de chey persona a otra y causa COVID-19 (enfermedad por coronavirus).  · El virus que causa el COVID-19 parece diseminarse fácilmente. Se transmite de chey persona a otra a través de las gotitas que se despiden al toser o al estornudar.  · Los adultos mayores y las personas que tienen enfermedades crónicas tienen mayor riesgo de contraer la enfermedad. Si tiene un riesgo más alto de tener complicaciones, tome precauciones adicionales.  · Actualmente, no existe ninguna vacuna para prevenir la enfermedad por coronavirus. No existen medicamentos, tri los antibióticos o los antivirales, para tratar el virus.  · Puede protegerse y proteger a valverde fidencio al lavarse las kerline con frecuencia, evitar tocarse la honey y cubrirse al toser y estornudar.  Esta información no tiene tri fin reemplazar el consejo del médico. Asegúrese de hacerle al médico cualquier pregunta que tenga.  Document Released: 04/27/2020 Document Revised: 04/27/2020 Document Reviewed: 04/27/2020  Elsevier Patient Education © 2020 Elsevier Inc.

## 2020-11-30 NOTE — PROGRESS NOTES
"Subjective:   Nila Quispe is a 65 y.o. female who presents for Body Aches (x2wks after flu shot )          Patient presents with concerns of malaise, fatigue, body aches x2 weeks.  Overall symptoms have improved.  She is concerned that symptoms are related to the flu vaccination there she received prior to developing symptoms.  States that both she and her  received the vaccination on the same day and they both became ill last night.  His symptoms fully resolved approximately a week ago.    Generalized Body Aches  This is a new problem. The current episode started 1 to 4 weeks ago (2 weeks). The problem occurs constantly. The problem has been unchanged. Associated symptoms include arthralgias, congestion (resolved), coughing (resolved), fatigue, a fever (\"almost everyday\" Tmax 102), headaches, myalgias and nausea. Pertinent negatives include no change in bowel habit, chest pain, sore throat or vomiting. Associated symptoms comments: Rare shortness of breath- none currently. Felt a bit \"wheezy\" this morning.. Nothing aggravates the symptoms. She has tried acetaminophen for the symptoms. The treatment provided mild relief.     Review of Systems   Constitutional: Positive for fatigue and fever (\"almost everyday\" Tmax 102).   HENT: Positive for congestion (resolved). Negative for sore throat.    Respiratory: Positive for cough (resolved).    Cardiovascular: Negative for chest pain.   Gastrointestinal: Positive for nausea. Negative for change in bowel habit and vomiting.   Musculoskeletal: Positive for arthralgias and myalgias.   Neurological: Positive for headaches.       PMH:  has a past medical history of Hyperlipidemia and Hypertension.  MEDS:   Current Outpatient Medications:   •  naproxen (NAPROSYN) 500 MG Tab, Take 1 Tab by mouth 2 times a day, with meals., Disp: 30 Tab, Rfl: 0  •  cyclobenzaprine (FLEXERIL) 10 MG Tab, Take 1 Tab by mouth 3 times a day as needed., Disp: 30 Tab, Rfl: 0  •  " "metoprolol SR (TOPROL XL) 25 MG TABLET SR 24 HR, Take 1 Tab by mouth every day., Disp: 90 Tab, Rfl: 3  •  atorvastatin (LIPITOR) 20 MG Tab, Take 1 Tab by mouth every evening., Disp: 90 Tab, Rfl: 3  •  Phenazopyridine HCl (AZO TABS PO), Take  by mouth., Disp: , Rfl:   ALLERGIES:   Allergies   Allergen Reactions   • Penicillins Hives     SURGHX:   Past Surgical History:   Procedure Laterality Date   • CHOLECYSTECTOMY  2009   • BREAST BIOPSY     • PRIMARY C SECTION      3 times, last one in 1986   • US-NEEDLE CORE BX-BREAST PANEL Left      SOCHX:  reports that she has never smoked. She has never used smokeless tobacco. She reports previous alcohol use. She reports that she does not use drugs.  FH: Family history was reviewed, no pertinent findings to report   Objective:   /78   Pulse 86   Temp 36.6 °C (97.9 °F) (Temporal)   Resp 16   Ht 1.6 m (5' 3\")   Wt 83 kg (183 lb)   LMP  (LMP Unknown)   SpO2 97%   BMI 32.42 kg/m²   Physical Exam  Vitals signs reviewed.   Constitutional:       General: She is not in acute distress.     Appearance: Normal appearance. She is well-developed. She is not toxic-appearing.   HENT:      Head: Normocephalic and atraumatic.      Right Ear: Tympanic membrane, ear canal and external ear normal.      Left Ear: Tympanic membrane, ear canal and external ear normal.      Nose: Mucosal edema and rhinorrhea present. Rhinorrhea is clear.      Mouth/Throat:      Lips: Pink.      Mouth: Mucous membranes are moist.      Pharynx: Oropharynx is clear. Uvula midline.   Eyes:      General: Gaze aligned appropriately.   Neck:      Musculoskeletal: Neck supple.   Cardiovascular:      Rate and Rhythm: Normal rate and regular rhythm.      Heart sounds: Normal heart sounds, S1 normal and S2 normal.   Pulmonary:      Effort: Pulmonary effort is normal. No respiratory distress.      Breath sounds: Normal breath sounds. No stridor. No decreased breath sounds, wheezing, rhonchi or rales.   Skin:     " General: Skin is warm and dry.      Capillary Refill: Capillary refill takes less than 2 seconds.   Neurological:      Mental Status: She is alert and oriented to person, place, and time.      Comments: CN2-12 grossly intact   Psychiatric:         Speech: Speech normal.         Behavior: Behavior normal.           Assessment/Plan:   1. Nonspecific syndrome suggestive of viral illness  - COVID/SARS COV-2 PCR; Future    2. Generalized body aches  - COVID/SARS COV-2 PCR; Future  - naproxen (NAPROSYN) 500 MG Tab; Take 1 Tab by mouth 2 times a day, with meals.  Dispense: 30 Tab; Refill: 0    Discussed with patient signs and symptoms most likely are due to a viral etiology. Overall, pt's symptoms are improving and the patient is well-appearing. She has a normal pulse oximetry on room air, afebrile, and a normal pulmonary exam. Therefore, I feel that the likelihood of pneumonia is low. I do not feel that this patient would benefit from antibiotics at this time.     Test for COVID-19. We will call/message back for results and appropriate further instructions. Instructed to sign up for [employer] if they have not already. Result will be released to [employer] application.   Symptomatic and supportive care:   Plenty of oral hydration and rest   Over the counter cough suppressant as directed.  Tylenol or ibuprofen for pain and fever as directed.   Saline nasal spray and Flonase as a decongestant.   Infection control measures at home. Stay away from people, Hand washing, covering sneeze/cough, disinfect surfaces.   Remain home from work, school, and other populated environments.    Differential diagnosis, natural history, supportive care, and indications for immediate follow-up discussed.

## 2020-12-01 ENCOUNTER — TELEPHONE (OUTPATIENT)
Dept: URGENT CARE | Facility: PHYSICIAN GROUP | Age: 65
End: 2020-12-01

## 2020-12-01 LAB
SARS-COV-2 RNA RESP QL NAA+PROBE: DETECTED
SPECIMEN SOURCE: ABNORMAL

## 2020-12-01 NOTE — RESULT ENCOUNTER NOTE
Please let patient know that testing is positive- she is Kyrgyz speaking only.    Thank you!  JENNYFER

## 2020-12-02 ENCOUNTER — TELEPHONE (OUTPATIENT)
Dept: URGENT CARE | Facility: PHYSICIAN GROUP | Age: 65
End: 2020-12-02

## 2020-12-02 NOTE — TELEPHONE ENCOUNTER
----- Message from Neeraj Perdomo P.A.-C. sent at 12/1/2020  8:11 AM PST -----  Please let patient know that testing is positive- she is Polish speaking only.    Thank you!  JH

## 2021-03-03 ENCOUNTER — TELEPHONE (OUTPATIENT)
Dept: PHYSICAL THERAPY | Facility: REHABILITATION | Age: 66
End: 2021-03-03

## 2021-03-03 NOTE — OP THERAPY DISCHARGE SUMMARY
Outpatient Physical Therapy  DISCHARGE SUMMARY NOTE      [address]  [address]  [place] [address]  Phone:  [phone]  Fax:  [phone]    Date of Visit: 03/03/2021    Patient: Nila Quispe  YOB: 1955  MRN: [id number]     Referring Provider:  John Lockett M.D.   Referring Diagnosis  Low back derangement syndrome M53.86           Functional Assessment Used        Your patient is being discharged from Physical Therapy with the following comments:   · Goals partially met  · Patient has failed to schedule or reschedule follow-up visits    Patient saw the doctor two days ago and he said that she may need surgery but surgeries are not always good--patient veronica not want sx     Patient reports that her symptoms are worse after work and with increased sitting       ASSESSMENT:   Patient abolished n/t in standing with posture correction.  Patient was able to centralize and abolish sx during treatment--mmt revealed good strength 4+/5 l3-s1 bilaterally equal     PLAN/RECOMMENDATIONS:   No patient contact since   10/1/20 .  Patient  is being discharged from therapy at this time.    Juan C Moore, PT, DPT, OCS    Date: 3/3/2021

## 2021-03-15 DIAGNOSIS — Z23 NEED FOR VACCINATION: ICD-10-CM

## 2021-04-04 ENCOUNTER — APPOINTMENT (OUTPATIENT)
Dept: RADIOLOGY | Facility: IMAGING CENTER | Age: 66
End: 2021-04-04
Attending: STUDENT IN AN ORGANIZED HEALTH CARE EDUCATION/TRAINING PROGRAM
Payer: COMMERCIAL

## 2021-04-04 ENCOUNTER — OFFICE VISIT (OUTPATIENT)
Dept: URGENT CARE | Facility: CLINIC | Age: 66
End: 2021-04-04
Payer: COMMERCIAL

## 2021-04-04 VITALS
SYSTOLIC BLOOD PRESSURE: 130 MMHG | TEMPERATURE: 97.6 F | DIASTOLIC BLOOD PRESSURE: 80 MMHG | BODY MASS INDEX: 32.82 KG/M2 | WEIGHT: 185.2 LBS | OXYGEN SATURATION: 97 % | HEIGHT: 63 IN | HEART RATE: 60 BPM

## 2021-04-04 DIAGNOSIS — M17.11 PRIMARY OSTEOARTHRITIS OF RIGHT KNEE: ICD-10-CM

## 2021-04-04 DIAGNOSIS — M25.561 ACUTE PAIN OF RIGHT KNEE: ICD-10-CM

## 2021-04-04 PROCEDURE — 99214 OFFICE O/P EST MOD 30 MIN: CPT | Performed by: STUDENT IN AN ORGANIZED HEALTH CARE EDUCATION/TRAINING PROGRAM

## 2021-04-04 RX ORDER — MELOXICAM 15 MG/1
15 TABLET ORAL DAILY
Qty: 30 TABLET | Refills: 0 | Status: SHIPPED | OUTPATIENT
Start: 2021-04-04 | End: 2021-07-25

## 2021-04-04 ASSESSMENT — FIBROSIS 4 INDEX: FIB4 SCORE: 1.44561264464840198

## 2021-04-04 NOTE — PROGRESS NOTES
Subjective:   CHIEF COMPLAINT  Chief Complaint   Patient presents with   • Knee Pain     Rt. knee pain radiating around the knee and leg x 12 days, fever and swelling, difficulty standing/walking, hx of back/disc probelms       HPI  Note the patient speaks English and the encounter was aided with the help of an     Nila Quispe is a 65 y.o. female who presents with a chief complaint of right knee pain of insidious onset which developed approximately 2 weeks ago.  No specific injury, trauma or previous history of surgery.  The patient says she stands all day while at work which causes the knee pain and the symptoms seem to get progressively worse during the day.  She has tried Tylenol and ibuprofen which have not helped.  Symptoms are aggravated with weightbearing and range of motion. She reports the knee pain has been keeping her up at night.  No clicking, catching or instability.  Mild swelling but no redness.      Patient also admits associated symptoms of a fever.  She attributes the pain is causing the fever.  Says she had a T-max of 102 which was taken by her daughter.  The patient is currently afebrile.  She is not experienced any systemic symptoms denies any fever, chills, nausea or vomiting.      REVIEW OF SYSTEMS  General: no fever or chills  GI: no nausea or vomiting  See HPI for further details.    PAST MEDICAL HISTORY   has a past medical history of Hyperlipidemia and Hypertension.    SURGICAL HISTORY   has a past surgical history that includes breast biopsy; us-needle core bx-breast panel (Left); cholecystectomy (2009); and primary c section.    ALLERGIES  Allergies   Allergen Reactions   • Penicillins Hives       CURRENT MEDICATIONS  Home Medications     Reviewed by Elian Mishra D.O. (Physician) on 04/04/21 at 1345  Med List Status: <None>   Medication Last Dose Status   atorvastatin (LIPITOR) 20 MG Tab Taking Active   cyclobenzaprine (FLEXERIL) 10 MG Tab Not Taking Active  "  metoprolol SR (TOPROL XL) 25 MG TABLET SR 24 HR Taking Active   naproxen (NAPROSYN) 500 MG Tab Not Taking Active   Phenazopyridine HCl (AZO TABS PO) Not Taking Active                SOCIAL HISTORY  Social History     Tobacco Use   • Smoking status: Never Smoker   • Smokeless tobacco: Never Used   Substance and Sexual Activity   • Alcohol use: Not Currently     Comment: 1 drink per month    • Drug use: No   • Sexual activity: Not Currently     Partners: Male     Birth control/protection: Post-Menopausal       FAMILY HISTORY  Family History   Problem Relation Age of Onset   • Cancer Mother         Cervical Cancer   • Heart Attack Father           Objective:   PHYSICAL EXAM  VITAL SIGNS: /80 (BP Location: Left arm, Patient Position: Sitting, BP Cuff Size: Adult)   Pulse 60   Temp 36.4 °C (97.6 °F) (Temporal)   Ht 1.6 m (5' 3\")   Wt 84 kg (185 lb 3.2 oz)   LMP  (LMP Unknown)   SpO2 97%   BMI 32.81 kg/m²     Gen: no acute distress, normal voice  Skin: dry, intact, moist mucosal membranes  Lungs: CTAB w/ symmetric expansion  CV: RRR w/o murmurs or clicks  MSK: Right knee: Mild effusion with bilateral valgus deformities.  No erythema or ecchymosis.  No skin breakdown or abrasions.  110 degrees flexion.  5 degree extensor lag.  Medial and lateral joint line tenderness.  No apprehension.  Negative Lachman.  Negative anterior posterior drawers.  No pain or laxity with varus or valgus stress.  Psych: normal affect, normal judgement, alert, awake      RADIOLOGY RESULTS   DX-KNEE 3 VIEWS RIGHT    Result Date: 4/4/2021 4/4/2021 1:47 PM HISTORY/REASON FOR EXAM:  Right knee pain TECHNIQUE/EXAM DESCRIPTION AND NUMBER OF VIEWS:  3 views of the RIGHT knee. COMPARISON: 6/19/2020 FINDINGS: BONE MINERALIZATION: Normal. JOINTS: Knee effusion. FRACTURE: None. DISLOCATION: None. SOFT TISSUES: No mass.     No acute osseous abnormality. Preserved joint spaces.             Assessment/Plan:     1. Acute pain of right knee  " DX-KNEE 3 VIEWS RIGHT    meloxicam (MOBIC) 15 MG tablet    diclofenac sodium 1 % Gel    REFERRAL TO SPORTS MEDICINE    CANCELED: REFERRAL TO SPORTS MEDICINE   2. Primary osteoarthritis of right knee     X-rays were ordered and personally reviewed and discussed with the patient which demonstrated medial joint line collapse/narrowing consistent with DJD.  Radiology read as unremarkable.  No evidence of acute osseous abnormalities.  Suspect symptoms are due to DJD.  However the patient did report she had a fever of 102.  She is currently afebrile and there were no findings subjectively or objectively to suggest a septic knee.  She had very mild effusion and did not believe there was enough fluid to perform an arthrocentesis.  Due to the fever I did not inject steroid.  -Rxs sent for meloxicam and topical Voltaren gel; instructed to D/C all other NSAIDs while taking meloxicam  -Referral sent to follow-up with sports med  -Discussed red flags and emergency room room precautions, specifically discussing recurrence of a fever, worsening pain, redness and/or swelling of the knee which is concerning for septic knee.  The patient verbalized understanding.      Differential diagnosis, natural history, supportive care, and indications for immediate follow-up discussed. All questions answered. Patient agrees with the plan of care.    Follow-up as needed if symptoms worsen or fail to improve to PCP, Urgent care or Emergency Room.    >30 minutes was spent caring for this patient on the day of the encounter which included face-to-face time, discussing the diagnosis, medical management (including medications, side effects and alternative treatments), follow-up, emergency room precautions and completion of the chart.    Please note that this dictation was created using voice recognition software. I have made a reasonable attempt to correct obvious errors, but I expect that there are errors of grammar and possibly content that I did  not discover before finalizing the note.

## 2021-04-27 ENCOUNTER — OFFICE VISIT (OUTPATIENT)
Dept: MEDICAL GROUP | Facility: CLINIC | Age: 66
End: 2021-04-27
Payer: COMMERCIAL

## 2021-04-27 VITALS
RESPIRATION RATE: 16 BRPM | DIASTOLIC BLOOD PRESSURE: 78 MMHG | TEMPERATURE: 98.3 F | HEART RATE: 65 BPM | BODY MASS INDEX: 32.82 KG/M2 | OXYGEN SATURATION: 97 % | HEIGHT: 63 IN | SYSTOLIC BLOOD PRESSURE: 118 MMHG | WEIGHT: 185.2 LBS

## 2021-04-27 DIAGNOSIS — M53.3 SACROILIAC JOINT DYSFUNCTION OF LEFT SIDE: ICD-10-CM

## 2021-04-27 DIAGNOSIS — M17.11 PRIMARY OSTEOARTHRITIS OF RIGHT KNEE: ICD-10-CM

## 2021-04-27 PROCEDURE — 99214 OFFICE O/P EST MOD 30 MIN: CPT | Mod: 57 | Performed by: FAMILY MEDICINE

## 2021-04-27 PROCEDURE — 20610 DRAIN/INJ JOINT/BURSA W/O US: CPT | Mod: RT | Performed by: FAMILY MEDICINE

## 2021-04-27 RX ORDER — TRIAMCINOLONE ACETONIDE 40 MG/ML
40 INJECTION, SUSPENSION INTRA-ARTICULAR; INTRAMUSCULAR ONCE
Status: COMPLETED | OUTPATIENT
Start: 2021-04-27 | End: 2021-04-27

## 2021-04-27 RX ADMIN — TRIAMCINOLONE ACETONIDE 40 MG: 40 INJECTION, SUSPENSION INTRA-ARTICULAR; INTRAMUSCULAR at 16:52

## 2021-04-27 ASSESSMENT — FIBROSIS 4 INDEX: FIB4 SCORE: 1.44561264464840198

## 2021-04-27 NOTE — PROGRESS NOTES
CHIEF COMPLAINT:  Nila Quispe female presenting at the request of Keegan Mishra DO  for evaluation of knee pain.     Nila Quispe is complaining of right knee pain  Insidious onset around the middle of March 2021  Pain is at the anteromedial knee, posterior and lateral leg  Quality is aching, constant  Pain is non-radiating   Improved with resting  Aggravated by walking  previous knee pain, last episode was about 6 months ago and episode lasted about a month  Prior Treatments: Seen at urgent care  Prior studies: X-Ray   Medications tried for pain include: Meloxicam when pain is very severe which helps occasionally  Mechanical Symptom history: No Locking and Popping which can be painful  Worse with stairs and walking up or down hills  Occasional night symptoms  She has done PT in the past at [facility]  She has had some swelling/effusion the RIGHT knee which has come down    Store work, standing entire shift  Likes walking    REVIEW OF SYSTEMS  No Nausea, No Vomiting, No Chest Pain, No Shortness of Breath, No Dizziness, Headache, new onset      PAST MEDICAL HISTORY:   History reviewed. No pertinent past medical history.    PMH:  has a past medical history of Hyperlipidemia and Hypertension.  MEDS:   Current Outpatient Medications:   •  meloxicam (MOBIC) 15 MG tablet, Take 1 tablet by mouth every day., Disp: 30 tablet, Rfl: 0  •  diclofenac sodium 1 % Gel, Apply 1 Application topically 4 times a day as needed (knee pain). Do not use beyond 21 days, Disp: 100 g, Rfl: 0  •  Phenazopyridine HCl (AZO TABS PO), Take  by mouth., Disp: , Rfl:   •  metoprolol SR (TOPROL XL) 25 MG TABLET SR 24 HR, Take 1 Tab by mouth every day., Disp: 90 Tab, Rfl: 3  •  atorvastatin (LIPITOR) 20 MG Tab, Take 1 Tab by mouth every evening., Disp: 90 Tab, Rfl: 3  ALLERGIES:   Allergies   Allergen Reactions   • Penicillins Hives     SURGHX:   Past Surgical History:   Procedure Laterality Date   • CHOLECYSTECTOMY  2009    "  • BREAST BIOPSY     • PRIMARY C SECTION      3 times, last one in 1986   • US-NEEDLE CORE BX-BREAST PANEL Left      SOCHX:  reports that she has never smoked. She has never used smokeless tobacco. She reports previous alcohol use. She reports that she does not use drugs.  FH: Family history was reviewed, no pertinent findings to report     PHYSICAL EXAM:  /78 (BP Location: Left arm, Patient Position: Sitting, BP Cuff Size: Adult)   Pulse 65   Temp 36.8 °C (98.3 °F) (Temporal)   Resp 16   Ht 1.6 m (5' 3\")   Wt 84 kg (185 lb 3.2 oz)   LMP  (LMP Unknown)   SpO2 97%   BMI 32.81 kg/m²      obese in no apparent distress, alert and oriented x 3.  Gait: antalgic     RIGHT Knee:  Slight Varus and No Swelling  Range of Motion Intact  Trace effusion  Patellar No tenderness and no apprehension  Medial Joint Line Tenderness and NEGATIVE Valencia  Lateral Joint Line Tenderness and NEGATIVE Valencia  Trace Laxity with Varus stress  Trace Laxity with Valgus stress  Lachman's testing is Trace  Posterior Drawer Testing is Trace  The leg is otherwise neurovascularly intact    LEFT Knee:  Slight Varus and No Swelling   Range of Motion Intact  Trace effusion  Patellar No tenderness and no apprehension  Medial Joint Line Tenderness and NEGATIVE Valencia  Lateral Joint Line Non-tender and NEGATIVE Valencia  Trace Laxity with Varus stress  Trace Laxity with Valgus stress  Lachman's testing is Trace  Posterior Drawer Testing is Trace  The leg is otherwise neurovascularly intact    Additional Findings: Tight hamstrings      1. Primary osteoarthritis of right knee  triamcinolone acetonide (KENALOG-40) injection 40 mg    REFERRAL TO PHYSICAL THERAPY   2. Sacroiliac joint dysfunction of left side  REFERRAL TO PHYSICAL THERAPY     Insidious onset around the middle of March 2021  Pain is at the anteromedial knee, posterior and lateral leg  Quality is aching, constant    Intra-articular corticosteroid injection of the RIGHT knee " performed in the office TODAY (April 27, 2021)    Patient was fitted with a hinged knee brace for her RIGHT knee  Patient was also fitted with a SI belt which helped her pain/SI joint symptoms    No follow-ups on file.   To see how she is doing with home exercise program, see how she does after RIGHT knee intra-articular corticosteroid injection, see how her SI joint belt is working and see if she is scheduled for formal physical therapy            4/4/2021 1:47 PM     HISTORY/REASON FOR EXAM:  Right knee pain     TECHNIQUE/EXAM DESCRIPTION AND NUMBER OF VIEWS:  3 views of the RIGHT knee.     COMPARISON: 6/19/2020     FINDINGS:     BONE MINERALIZATION: Normal.  JOINTS: Knee effusion.  FRACTURE: None.  DISLOCATION: None.  SOFT TISSUES: No mass.     IMPRESSION:     No acute osseous abnormality. Preserved joint spaces.    done elsewhere and reviewed independently by me    Thank you Keegan Mishra DO for allowing me to participate in caring for your patient.    Greater than 30 minutes was spent reviewing patient history, discussing current issue, physical examination, reviewing results and documenting the visit.

## 2021-04-28 NOTE — PROCEDURES
PROCEDURE NOTE:  right Knee corticosteroid injection  Risks and benefits discussed  Informed consent obtained  Knee prepped in sterile fashion utilizing a jorje- inferior approach  40 mg of Kenalog and 5 cc of bupivacaine injected into the knee joint space  Vapocoolant spray was utilized  Patient tolerated the procedure well  Postprocedure care and red flags discussed

## 2021-05-19 ENCOUNTER — OFFICE VISIT (OUTPATIENT)
Dept: URGENT CARE | Facility: PHYSICIAN GROUP | Age: 66
End: 2021-05-19
Payer: COMMERCIAL

## 2021-05-19 VITALS
BODY MASS INDEX: 32.78 KG/M2 | DIASTOLIC BLOOD PRESSURE: 72 MMHG | TEMPERATURE: 97.5 F | HEIGHT: 63 IN | RESPIRATION RATE: 12 BRPM | HEART RATE: 71 BPM | OXYGEN SATURATION: 98 % | WEIGHT: 185 LBS | SYSTOLIC BLOOD PRESSURE: 142 MMHG

## 2021-05-19 DIAGNOSIS — J01.00 ACUTE NON-RECURRENT MAXILLARY SINUSITIS: ICD-10-CM

## 2021-05-19 DIAGNOSIS — H92.02 ACUTE OTALGIA, LEFT: ICD-10-CM

## 2021-05-19 DIAGNOSIS — R05.9 COUGH: ICD-10-CM

## 2021-05-19 PROCEDURE — 99214 OFFICE O/P EST MOD 30 MIN: CPT | Performed by: FAMILY MEDICINE

## 2021-05-19 RX ORDER — AZITHROMYCIN 250 MG/1
TABLET, FILM COATED ORAL
Qty: 6 TABLET | Refills: 0 | Status: SHIPPED | OUTPATIENT
Start: 2021-05-19 | End: 2021-07-25

## 2021-05-19 RX ORDER — BENZONATATE 100 MG/1
100 CAPSULE ORAL 3 TIMES DAILY PRN
Qty: 30 CAPSULE | Refills: 0 | Status: SHIPPED | OUTPATIENT
Start: 2021-05-19 | End: 2021-07-25

## 2021-05-19 ASSESSMENT — ENCOUNTER SYMPTOMS
VOMITING: 0
MYALGIAS: 0
FEVER: 1
COUGH: 1
SORE THROAT: 1
SHORTNESS OF BREATH: 0
DIZZINESS: 0
NAUSEA: 0
CHILLS: 0

## 2021-05-19 ASSESSMENT — FIBROSIS 4 INDEX: FIB4 SCORE: 1.44561264464840198

## 2021-05-19 NOTE — PROGRESS NOTES
Subjective:   Nila Quispe is a 65 y.o. female who presents for Cough (worse at night 2 days, SOB), Pharyngitis (x1 week ), Otalgia (left ear pain, no drainage, x4 days ), and Fever (as high as 99 last week )        A qualified  was used to interpret   during this encounter.  ’s name/ID number was Nila and mode of interpretation was iPad    .      Cough  This is a new problem. The current episode started in the past 7 days. The problem has been unchanged. The cough is non-productive. Associated symptoms include ear pain, a fever, nasal congestion and a sore throat. Pertinent negatives include no chills, myalgias, rash or shortness of breath. Associated symptoms comments: Complains sinus pain and pressure  . Risk factors: COVID-19 December 2020. She has tried rest for the symptoms. The treatment provided no relief.   Pharyngitis   Associated symptoms include coughing and ear pain. Pertinent negatives include no shortness of breath or vomiting.   Otalgia   Associated symptoms include coughing and a sore throat. Pertinent negatives include no rash or vomiting.   Fever   Associated symptoms include coughing, ear pain and a sore throat. Pertinent negatives include no nausea, rash, urinary pain or vomiting.     PMH:  has a past medical history of Hyperlipidemia and Hypertension.  MEDS:   Current Outpatient Medications:   •  azithromycin (ZITHROMAX) 250 MG Tab, 2 tablets orally once day number one and then 1 tablet orally every day for 4 days, Disp: 6 tablet, Rfl: 0  •  benzonatate (TESSALON) 100 MG Cap, Take 1 capsule by mouth 3 times a day as needed for Cough., Disp: 30 capsule, Rfl: 0  •  atorvastatin (LIPITOR) 20 MG Tab, TOME BRANDY TABLETA TODOS LOS WHITFIELD EN LA NOCHE, Disp: 90 tablet, Rfl: 3  •  metoprolol SR (TOPROL XL) 25 MG TABLET SR 24 HR, Take 1 Tab by mouth every day., Disp: 90 Tab, Rfl: 3  •  meloxicam (MOBIC) 15 MG tablet, Take 1 tablet by mouth every day. (Patient not  "taking: Reported on 5/19/2021), Disp: 30 tablet, Rfl: 0  •  diclofenac sodium 1 % Gel, Apply 1 Application topically 4 times a day as needed (knee pain). Do not use beyond 21 days (Patient not taking: Reported on 5/19/2021), Disp: 100 g, Rfl: 0  •  Phenazopyridine HCl (AZO TABS PO), Take  by mouth. (Patient not taking: Reported on 5/19/2021), Disp: , Rfl:   ALLERGIES:   Allergies   Allergen Reactions   • Penicillins Hives     SURGHX:   Past Surgical History:   Procedure Laterality Date   • CHOLECYSTECTOMY  2009   • BREAST BIOPSY     • PRIMARY C SECTION      3 times, last one in 1986   • US-NEEDLE CORE BX-BREAST PANEL Left      SOCHX:  reports that she has never smoked. She has never used smokeless tobacco. She reports previous alcohol use. She reports that she does not use drugs.  FH:   Family History   Problem Relation Age of Onset   • Cancer Mother         Cervical Cancer   • Heart Attack Father      Review of Systems   Constitutional: Positive for fever. Negative for chills.   HENT: Positive for ear pain and sore throat.    Respiratory: Positive for cough. Negative for shortness of breath.    Gastrointestinal: Negative for nausea and vomiting.   Genitourinary: Negative for dysuria.   Musculoskeletal: Negative for myalgias.   Skin: Negative for rash.   Neurological: Negative for dizziness.        Objective:   /72 (BP Location: Left arm, Patient Position: Sitting, BP Cuff Size: Adult)   Pulse 71   Temp 36.4 °C (97.5 °F) (Temporal)   Resp 12   Ht 1.6 m (5' 3\")   Wt 83.9 kg (185 lb)   LMP  (LMP Unknown)   SpO2 98%   BMI 32.77 kg/m²   Physical Exam  Vitals and nursing note reviewed.   Constitutional:       General: She is not in acute distress.     Appearance: She is well-developed.   HENT:      Head: Normocephalic and atraumatic.      Right Ear: External ear normal.      Left Ear: External ear normal.      Nose: Nose normal.      Right Turbinates: Swollen.      Left Turbinates: Swollen.      Comments: " Turbinates edematous, purulent exudate noted     Mouth/Throat:      Mouth: Mucous membranes are moist.      Pharynx: Posterior oropharyngeal erythema (posterior pharyngeal drainage and erythema) present.   Eyes:      Conjunctiva/sclera: Conjunctivae normal.   Cardiovascular:      Rate and Rhythm: Normal rate.   Pulmonary:      Effort: Pulmonary effort is normal. No respiratory distress.      Breath sounds: Normal breath sounds. No wheezing or rhonchi.   Abdominal:      General: There is no distension.   Musculoskeletal:         General: Normal range of motion.   Skin:     General: Skin is warm and dry.   Neurological:      General: No focal deficit present.      Mental Status: She is alert and oriented to person, place, and time. Mental status is at baseline.      Gait: Gait (gait at baseline) normal.   Psychiatric:         Judgment: Judgment normal.           Assessment/Plan:   1. Acute non-recurrent maxillary sinusitis  - azithromycin (ZITHROMAX) 250 MG Tab; 2 tablets orally once day number one and then 1 tablet orally every day for 4 days  Dispense: 6 tablet; Refill: 0    2. Acute otalgia, left  - azithromycin (ZITHROMAX) 250 MG Tab; 2 tablets orally once day number one and then 1 tablet orally every day for 4 days  Dispense: 6 tablet; Refill: 0    3. Cough  - benzonatate (TESSALON) 100 MG Cap; Take 1 capsule by mouth 3 times a day as needed for Cough.  Dispense: 30 capsule; Refill: 0        Medical Decision Making/Course:  In the course of preparing for this visit with review of the pertinent past medical history, recent and past clinic visits, current medications, and in the further course of obtaining the current history pertinent to the clinic visit today, performing an exam and evaluation, ordering and independently evaluating labs, tests, and/or procedures, prescribing any recommended new medications as noted above, providing any pertinent counseling and education and recommending further coordination of  care, at least 20 minutes of total time were spent during this encounter.      Discussed close monitoring, return precautions, and supportive measures of maintaining adequate fluid hydration and caloric intake, relative rest and symptom management as needed for pain and/or fever.    Differential diagnosis, natural history, supportive care, and indications for immediate follow-up discussed.     Advised the patient to follow-up with the primary care physician for recheck, reevaluation, and consideration of further management.    Please note that this dictation was created using voice recognition software. I have worked with consultants from the vendor as well as technical experts from [employer] to optimize the interface. I have made every reasonable attempt to correct obvious errors, but I expect that there are errors of grammar and possibly content that I did not discover before finalizing the note.

## 2021-05-19 NOTE — PATIENT INSTRUCTIONS
Sinusitis, Adult  Sinusitis is soreness and swelling (inflammation) of your sinuses. Sinuses are hollow spaces in the bones around your face. They are located:  · Around your eyes.  · In the middle of your forehead.  · Behind your nose.  · In your cheekbones.  Your sinuses and nasal passages are lined with a fluid called mucus. Mucus drains out of your sinuses. Swelling can trap mucus in your sinuses. This lets germs (bacteria, virus, or fungus) grow, which leads to infection. Most of the time, this condition is caused by a virus.  What are the causes?  This condition is caused by:  · Allergies.  · Asthma.  · Germs.  · Things that block your nose or sinuses.  · Growths in the nose (nasal polyps).  · Chemicals or irritants in the air.  · Fungus (rare).  What increases the risk?  You are more likely to develop this condition if:  · You have a weak body defense system (immune system).  · You do a lot of swimming or diving.  · You use nasal sprays too much.  · You smoke.  What are the signs or symptoms?  The main symptoms of this condition are pain and a feeling of pressure around the sinuses. Other symptoms include:  · Stuffy nose (congestion).  · Runny nose (drainage).  · Swelling and warmth in the sinuses.  · Headache.  · Toothache.  · A cough that may get worse at night.  · Mucus that collects in the throat or the back of the nose (postnasal drip).  · Being unable to smell and taste.  · Being very tired (fatigue).  · A fever.  · Sore throat.  · Bad breath.  How is this diagnosed?  This condition is diagnosed based on:  · Your symptoms.  · Your medical history.  · A physical exam.  · Tests to find out if your condition is short-term (acute) or long-term (chronic). Your doctor may:  ? Check your nose for growths (polyps).  ? Check your sinuses using a tool that has a light (endoscope).  ? Check for allergies or germs.  ? Do imaging tests, such as an MRI or CT scan.  How is this treated?  Treatment for this condition  depends on the cause and whether it is short-term or long-term.  · If caused by a virus, your symptoms should go away on their own within 10 days. You may be given medicines to relieve symptoms. They include:  ? Medicines that shrink swollen tissue in the nose.  ? Medicines that treat allergies (antihistamines).  ? A spray that treats swelling of the nostrils.   ? Rinses that help get rid of thick mucus in your nose (nasal saline washes).  · If caused by bacteria, your doctor may wait to see if you will get better without treatment. You may be given antibiotic medicine if you have:  ? A very bad infection.  ? A weak body defense system.  · If caused by growths in the nose, you may need to have surgery.  Follow these instructions at home:  Medicines  · Take, use, or apply over-the-counter and prescription medicines only as told by your doctor. These may include nasal sprays.  · If you were prescribed an antibiotic medicine, take it as told by your doctor. Do not stop taking the antibiotic even if you start to feel better.  Hydrate and humidify    · Drink enough water to keep your pee (urine) pale yellow.  · Use a cool mist humidifier to keep the humidity level in your home above 50%.  · Breathe in steam for 10-15 minutes, 3-4 times a day, or as told by your doctor. You can do this in the bathroom while a hot shower is running.  · Try not to spend time in cool or dry air.  Rest  · Rest as much as you can.  · Sleep with your head raised (elevated).  · Make sure you get enough sleep each night.  General instructions    · Put a warm, moist washcloth on your face 3-4 times a day, or as often as told by your doctor. This will help with discomfort.  · Wash your hands often with soap and water. If there is no soap and water, use hand .  · Do not smoke. Avoid being around people who are smoking (secondhand smoke).  · Keep all follow-up visits as told by your doctor. This is important.  Contact a doctor if:  · You  have a fever.  · Your symptoms get worse.  · Your symptoms do not get better within 10 days.  Get help right away if:  · You have a very bad headache.  · You cannot stop throwing up (vomiting).  · You have very bad pain or swelling around your face or eyes.  · You have trouble seeing.  · You feel confused.  · Your neck is stiff.  · You have trouble breathing.  Summary  · Sinusitis is swelling of your sinuses. Sinuses are hollow spaces in the bones around your face.  · This condition is caused by tissues in your nose that become inflamed or swollen. This traps germs. These can lead to infection.  · If you were prescribed an antibiotic medicine, take it as told by your doctor. Do not stop taking it even if you start to feel better.  · Keep all follow-up visits as told by your doctor. This is important.  This information is not intended to replace advice given to you by your health care provider. Make sure you discuss any questions you have with your health care provider.  Document Released: 06/05/2009 Document Revised: 05/20/2019 Document Reviewed: 05/20/2019  [employer] Patient Education © 2020 [employer]

## 2021-05-21 DIAGNOSIS — I10 ESSENTIAL HYPERTENSION: ICD-10-CM

## 2021-05-21 RX ORDER — METOPROLOL SUCCINATE 25 MG/1
TABLET, EXTENDED RELEASE ORAL
Qty: 90 TABLET | Refills: 0 | Status: SHIPPED | OUTPATIENT
Start: 2021-05-21 | End: 2021-08-18

## 2021-06-17 ENCOUNTER — GYNECOLOGY VISIT (OUTPATIENT)
Dept: OBGYN | Facility: CLINIC | Age: 66
End: 2021-06-17
Payer: COMMERCIAL

## 2021-06-17 ENCOUNTER — HOSPITAL ENCOUNTER (OUTPATIENT)
Facility: MEDICAL CENTER | Age: 66
End: 2021-06-17
Attending: OBSTETRICS & GYNECOLOGY
Payer: COMMERCIAL

## 2021-06-17 VITALS — WEIGHT: 187.7 LBS | BODY MASS INDEX: 33.25 KG/M2 | DIASTOLIC BLOOD PRESSURE: 74 MMHG | SYSTOLIC BLOOD PRESSURE: 125 MMHG

## 2021-06-17 DIAGNOSIS — B97.7 HIGH RISK HPV INFECTION: ICD-10-CM

## 2021-06-17 LAB — PATHOLOGY CONSULT NOTE: NORMAL

## 2021-06-17 PROCEDURE — 88305 TISSUE EXAM BY PATHOLOGIST: CPT

## 2021-06-17 PROCEDURE — 57454 BX/CURETT OF CERVIX W/SCOPE: CPT | Performed by: OBSTETRICS & GYNECOLOGY

## 2021-06-17 PROCEDURE — 88175 CYTOPATH C/V AUTO FLUID REDO: CPT

## 2021-06-17 PROCEDURE — 87624 HPV HI-RISK TYP POOLED RSLT: CPT

## 2021-06-17 ASSESSMENT — FIBROSIS 4 INDEX: FIB4 SCORE: 1.44561264464840198

## 2021-06-17 NOTE — PROGRESS NOTES
Pap 2018- HPV+, normal pap  Pap 2019- HPV+, normal pap  Consult with Dr. Villatoro 2020- discussed colposcopy  Now here for FU.

## 2021-06-17 NOTE — PROCEDURES
Nila Rodríguez Brittaney  65 y.o.  Female  here today for colposcopy to evaluate her for abnormal pap:     Colposcopy    Indication:  Pervasive HPV    Prior to beginning the procedure, risk and benefits of a colposcopy with possibility of biopsies were discussed including the risk of infection, bleeding, and pain. Patient understands the risks associated with the procedure, questions have been answered and consents have been signed to the chart.    Procedure in detail:    Speculum is placed and cervix is visualized. The cervix is cleansed with dilute acetic acid solution.     Physical Exam  Genitourinary:           Comments: Friable in circular area, otherwise no acetowhite changes, punctation, mosaicism, or abnormal vascular changes.        Satisfactory: YES   Squamo-columnar junction seen: YES   Entire lesion seen: YES   Biopsies done: Yes   Biopsy site:  1 o clock  ECC: YES    Impression:  65 y.o.  here for colposcopy for pervasive HPV.   -Biopsy done at 1:00 secondary to friability.  No acetowhite changes, punctation, mosaicism, or abnormal vascular changes noted.      Recommendations:  -We will follow up with patient once results are back.

## 2021-06-17 NOTE — NON-PROVIDER
Pt is here for Colpo  Good phone:[phone]  Pharmacy verified.  Last Pap:10/1/2019, neg with HPV HR +  Pt states no complaints for today.   ID:[id number]

## 2021-06-22 ENCOUNTER — TELEPHONE (OUTPATIENT)
Dept: OBGYN | Facility: CLINIC | Age: 66
End: 2021-06-22

## 2021-06-22 NOTE — TELEPHONE ENCOUNTER
----- Message from Rebecca Romero D.O. sent at 6/22/2021 12:07 PM PDT -----  Please let patient know that her results were low grade and she needs a repeat pap in one year. I think she is Romansh speaking.    Per Dr. Romero pt has low grade on colpo biopsy.       6/22/2021 1616 Left message for pt to call back regarding biopsy results.   6/24/2021 1313 pt called back and notified as above. Pt verbalized understanding.  Pt states since her biopsy on 6/17/2021 she has been having a pain her left side and today was radiating down to her left leg. Pt also stated she started having spotting 2 days after having biopsy and she is worry. Explained to pt this RN will consulate with provider and will call her back. Pt agreed and verbalized understanding.   1419 consulted with with Dr. Padilla and advised to check and see if pt has any urinary symptoms that can be related to her pain, if no symptoms to advise pt to to take tylenol or ibuprofen and to apply heat to the area. Regarding about the spotting Dr. Padilla stated this can be normal and it usually can take up to 2 weeks for the cervix to heal and to stop the spotting.   1425 called pt back and explained as above and advised to call us back if her pain doesn't get better for her to see Dr. Romero. Pt agreed and verbalized understanding.

## 2021-06-22 NOTE — RESULT ENCOUNTER NOTE
Please let patient know that her results were low grade and she needs a repeat pap in one year. I think she is Armenian speaking.

## 2021-07-01 ENCOUNTER — PHYSICAL THERAPY (OUTPATIENT)
Dept: PHYSICAL THERAPY | Facility: REHABILITATION | Age: 66
End: 2021-07-01
Attending: FAMILY MEDICINE
Payer: COMMERCIAL

## 2021-07-01 DIAGNOSIS — M17.11 PRIMARY OSTEOARTHRITIS OF RIGHT KNEE: ICD-10-CM

## 2021-07-01 DIAGNOSIS — M54.16 LUMBAR RADICULOPATHY: ICD-10-CM

## 2021-07-01 DIAGNOSIS — M53.3 SACROILIAC JOINT DYSFUNCTION OF LEFT SIDE: ICD-10-CM

## 2021-07-01 PROCEDURE — 97162 PT EVAL MOD COMPLEX 30 MIN: CPT

## 2021-07-01 SDOH — ECONOMIC STABILITY: GENERAL: QUALITY OF LIFE: GOOD

## 2021-07-01 ASSESSMENT — ENCOUNTER SYMPTOMS
PAIN SCALE AT HIGHEST: 10
ALLEVIATING FACTORS: CHANGE IN POSITION
PAIN TIMING: EVERY EVENING
QUALITY: SHARP
PAIN TIMING: CONTINUOUSLY
EXACERBATED BY: ACTIVITY
ALLEVIATING FACTORS: POSITION CHANGE
PAIN SCALE AT LOWEST: 2
EXACERBATED BY: HOUSEWORK
PAIN TIMING: WHEN ACTIVE
EXACERBATED BY: PROLONGED STANDING
EXACERBATED BY: BENDING
QUALITY: TINGLING
QUALITY: HOT
ALLEVIATING FACTORS: ORTHOTICS
QUALITY: NUMBNESS
EXACERBATED BY: LIFTING
QUALITY: ACHING
EXACERBATED BY: FATIGUE
PAIN SCALE: 8
ALLEVIATING FACTORS: ACTIVITY MODIFICATION
ALLEVIATING FACTORS: REST

## 2021-07-01 NOTE — OP THERAPY EVALUATION
"  Outpatient Physical Therapy  INITIAL EVALUATION    [facility] [address].  [address]  [place] [address]  Phone:  [phone]  Fax:  [phone]    Date of Evaluation: 07/01/2021    Patient: Nila Quispe  YOB: 1955  MRN: [id number]     Referring Provider: Payam Joel M.D.  [address] [place] [address] [place] [address]   Referring Diagnosis Primary osteoarthritis of right knee [M17.11];Sacroiliac joint dysfunction of left side [M53.3]     Time Calculation  Start time: 1608  Stop time: 1705 Time Calculation (min): 57 minutes         Chief Complaint: Knee Problem and Back Problem    Visit Diagnoses     ICD-10-CM   1. Lumbar radiculopathy  M54.16   2. Sacroiliac joint dysfunction of left side  M53.3   3. Primary osteoarthritis of right knee  M17.11       Date of onset of impairment: 4/4/2021    Subjective:   History of Present Illness:     Date of onset:  4/4/2021    Mechanism of injury:  Per pt's office visit on 4/4/21, \"Nila Quispe is a 65 y.o. female who presents with a chief complaint of right knee pain of insidious onset which developed approximately 2 weeks ago.  No specific injury, trauma or previous history of surgery.  The patient says she stands all day while at work which causes the knee pain and the symptoms seem to get progressively worse during the day.  She has tried Tylenol and ibuprofen which have not helped.  Symptoms are aggravated with weightbearing and range of motion. She reports the knee pain has been keeping her up at night.  No clicking, catching or instability.  Mild swelling but no redness.\"    Utilized [employer]  for today's session. The pt states that her R knee is feeling better but now her L knee is painful. She thinks that she has a herniated disc in L5. She states that she can't bend down to touch past her knee. She states that she has to take tylenol to alleviate the pain. She states that the " pain never goes away, she feels it all day and all night. 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 Sometimes the pain affects her sleep, and she always has to take tylenol to sleep. The pain is better in the morning but worsens throughout the day. The pt has previously gotten epidurals that does help her pain, but she hasn't had one recently. She reports that she has difficulty with house work and is unable to vacuum or perform heavy lifting due to the pain. She has been told she may need surgery but does not want to consider surgical intervention at this time.   Quality of life:  Good  Prior level of function:  Pt previously independent with all IADLs  Sleep disturbance:  Uses sleep aids to fall asleep and difficulty falling asleep  Pain:     Current pain ratin    At best pain ratin    At worst pain rating:  10    Location:  L knee/low back    Quality:  Sharp, tingling, numbness, aching and hot (Cold drops around her leg to the foot)    Pain timing:  When active, continuously and every evening    Relieving factors:  Activity modification, change in position, rest, position change and orthotics    Aggravating factors:  Activity, bending, housework, prolonged standing, fatigue and lifting    Progression:  Worsening  Social Support:     Lives in:  One-story house    Lives with:  Spouse  Treatments:     None    Patient Goals:     Patient goals for therapy:  Decreased pain, return to work and increased strength      Past Medical History:   Diagnosis Date   • Hyperlipidemia    • Hypertension      Past Surgical History:   Procedure Laterality Date   • CHOLECYSTECTOMY     • BREAST BIOPSY     • PRIMARY C SECTION      3 times, last one in    • US-NEEDLE CORE BX-BREAST PANEL Left      Social History     Tobacco Use   • Smoking status: Never Smoker   • Smokeless tobacco: Never Used   Substance Use Topics   • Alcohol use: Not  Currently     Comment: 1 drink per month      Family and Occupational History     Socioeconomic History   • Marital status:      Spouse name: Not on file   • Number of children: Not on file   • Years of education: Not on file   • Highest education level: Not on file   Occupational History   • Not on file       Objective     Observations   Central spine     Positive for hypolordosis.    Additional Observation Details  Decreased weight bearing through the L, apprehension with full load onto the L    Postural Observations  Seated posture: fair  Standing posture: fair        Hip Screen   Hip range of motion within functional limits with the following exceptions: Apprehensive to L hip PROM, no significant mobility restrictions noted  Hip strength within functional limits    Neurological Testing     Reflexes   Left   Patellar (L4): normal (2+)  Achilles (S1): normal (2+)  Ankle clonus reflex: negative    Right   Patellar (L4): normal (2+)  Achilles (S1): normal (2+)  Ankle clonus reflex: negative    Myotome testing   Lumbar (left)   L2 (hip flexors): 4  L3 (knee extensors): 4  L4 (ankle dorsiflexors): 5    Lumbar (right)   L2 (hip flexors): 4  L3 (knee extensors): 4  L4 (ankle dorsiflexors): 5    Dermatome testing   Lumbar (left)   All left lumbar dermatomes intact    Lumbar (right)   L2: decreased  L3: decreased  L4: intact  L5: intact  S1: intact    Palpation   Left   Muscle spasm in the lumbar paraspinals.   Tenderness of the gluteus ravinder, gluteus medius, lumbar paraspinals and piriformis.     Tenderness     Left Hip   Tenderness in the sacroiliac joint and sacrum.      Active Range of Motion     Lumbar   Flexion: decreased (Pain increased peripherally)  Extension: decreased (Centralization)  Left lateral flexion: Left lateral lumbar spine flexion: Increased.  Right lateral flexion: Right lateral lumbar spine flexion: Decreased sx.  Left rotation: within functional limits  Right rotation: within functional  "limits  Left Knee   Flexion: with pain  Extension: WFL    Right Knee   Flexion: WFL  Extension: WFL    Additional Active Range of Motion Details  Mild limitations in rotation, no change in radicular symptoms  Min loss of terminal flexion ROM on the L    Joint Play   Spine     Central PA [place]        L1: hypomobile       L2: hypomobile       L3: painful and hypomobile       L4: painful and hypomobile (Pain reported in L toes)       L5: painful and hypomobile (Pain reported in L toes)       S1: painful and hypomobile        Nick LumbarTest   Static tests   Lying prone: Pt reported radicular symptoms on L, no change following 60\" in prone  Lying prone on elbows: Radicular symptoms on the L, no centralization  Standing erect: Pt reported centralization of symptoms        Therapeutic Exercises (CPT 99548):       Therapeutic Exercise Summary: Pt given 15 minutes MHP supine with knees elevated on bolster.       Time-based treatments/modalities:           Assessment, Response and Plan:   Impairments: abnormal gait, abnormal or restricted ROM, activity intolerance, difficulty performing job, hypersensitivity, lacks appropriate home exercise program, limited mobility and pain with function    Assessment details:  Nila Quispe is a 65 y.o. female who presents to skilled physical therapist initial evaluation with current complaints of LBP with L sided radiulopathy. The R knee pain that the pt has previously experienced resolved following cortisone injection. She presents with objective impairments in standing and sitting posture, lumbar passive and active ROM, L sided radicular symptoms, decreased LE strength, and decreased tolerance to desired functional activities and required work duties including prolonged standing and lifting. The impairments found during today's evaluation can be addressed by PT intervention, but her prognosis and time to achieve goals may be impacted by the chronicity of her symptoms. However, " the pt appears motivated to participate in PT intervention to address her current limitations and avoid surgical intervention. It is anticipated that the pt will benefit from skilled physical therapy intervention to address functional limitations and impairments detailed above and to maximize her return to PLOF.   Other barriers to therapy:  Sensitivity, chronicity of symptoms  Prognosis: good    Goals:   Short Term Goals:   1. Pt will be able to stand for 1 hour without an increase in LBP  2. Pt will report centralization of symptoms to not distal to the knee  3. Pt will be able to perform house work for 30 minutes without an increase in symptoms  4. Pt will be able to sleep for 4 hours without being awoken by pain, without requiring Tylenol  Short term goal time span:  2-4 weeks      Long Term Goals:    1. Pt will be able to perform a full work shift with <3/10 pain at the end of the day  2. Pt will be able to sleep throughout the night without an increase in symptoms  3. Pt will be able to lift 30# from the ground with good mechanics and appropriate self-efficacy  4. Pt will be independent with HEP  Long term goal time span:  6-8 weeks    Plan:   Therapy options:  Physical therapy treatment to continue  Planned therapy interventions:  Manual Therapy (CPT 18837), Mechanical Traction (CPT 85429), Neuromuscular Re-education (CPT 13956), Therapeutic Exercise (CPT 83071) and E Stim Unattended (CPT 27792)  Frequency:  2x week  Duration in weeks:  6  Duration in visits:  12  Discussed with:  Patient  Plan details:  Pt educated on their current objective clinical presentation, anticipated PT POC, and importance of adherence to prescribed HEP in order to maximize PT benefit.        Functional Assessment Used  WOMAC Grand Total: 73.96     Referring provider co-signature:  I have reviewed this plan of care and my co-signature certifies the need for services.    Certification Period: 07/01/2021 to  08/26/21    Physician  Signature: ________________________________ Date: ______________

## 2021-07-07 ENCOUNTER — PHYSICAL THERAPY (OUTPATIENT)
Dept: PHYSICAL THERAPY | Facility: REHABILITATION | Age: 66
End: 2021-07-07
Attending: FAMILY MEDICINE
Payer: COMMERCIAL

## 2021-07-07 DIAGNOSIS — M54.16 LUMBAR RADICULOPATHY: ICD-10-CM

## 2021-07-07 DIAGNOSIS — M53.3 SACROILIAC JOINT DYSFUNCTION OF LEFT SIDE: ICD-10-CM

## 2021-07-07 PROCEDURE — 97012 MECHANICAL TRACTION THERAPY: CPT

## 2021-07-07 PROCEDURE — 97110 THERAPEUTIC EXERCISES: CPT

## 2021-07-07 NOTE — OP THERAPY DAILY TREATMENT
"  Outpatient Physical Therapy  DAILY TREATMENT     [facility] Physical [address].  [address]  [place] [address]  Phone:  [phone]  Fax:  [phone]    Date: 07/07/2021    Patient: Nila Quispe  YOB: 1955  MRN: [id number]     Time Calculation    Start time: 1625  Stop time: 1715 Time Calculation (min): 50 minutes         Chief Complaint: Back Problem    Visit #: 2    SUBJECTIVE:  The pt states that her symptoms are about the same since her IE. She states that she continues to have difficulty with her work duties. She states that the pain is in the back of her L leg and feels like cold water going down her calf.     OBJECTIVE:  Current objective measures: Pt presents reporting radicular symptoms to her L calf           Therapeutic Exercises (CPT 42138):     1. Supine TA breathing, 10x3\"    2. Supine bridging , 10x    3. Supine marches, 10x each side    4. Standing lumbar flexion with table , 10x     5. Standing lumbar extension, 10x      Therapeutic Exercise Summary: Pt given 15 minutes MHP supine with knees elevated on bolster at beginning of treatment session  Pt educated to initiate standing lumbar flexion and extension while at work to tolerance  Pt education on consideration of spine surgery as a way to address radicular symptoms    Therapeutic Treatments and Modalities:     1. Manual Therapy (CPT 08453), Supine L LE long axis traction, side-lying grade II-III lumbar rotation mobilizations, gentle SIJ mobilizations, gentle STM to L glute med, piriformis, lumbar paraspinals    2. Mechanical Traction (CPT 33476), 80/60 lb in supine on MHP x10 minutes post session    Time-based treatments/modalities:    Physical Therapy Timed Treatment Charges  Therapeutic exercise minutes (CPT 51878): 25 minutes    ASSESSMENT:   Response to treatment: The pt continues to present with high sensitivity of her radicular symptoms with gentle therex. She did report " centralization of symptoms to glute with manual long axis traction, therefore trialed mechanical traction to monitor pt's response. She tolerated gentle standing lumbar mobility exercises to promote flexion and extension so that she can perform exercises at work to minimize the aggravation of her symptoms. Will continue to closely monitor pt's response to therapy, if radicular symptoms persist after 2-3 visits, will refer back to physician.     PLAN/RECOMMENDATIONS:   Plan for treatment: therapy treatment to continue next visit.  Planned interventions for next visit: continue with current treatment. Continue with progression of lumbar mobility and core strengthening program to tolerance. If symptoms persist, consider referral for spine evaluation.

## 2021-07-09 ENCOUNTER — PHYSICAL THERAPY (OUTPATIENT)
Dept: PHYSICAL THERAPY | Facility: REHABILITATION | Age: 66
End: 2021-07-09
Attending: FAMILY MEDICINE
Payer: COMMERCIAL

## 2021-07-09 DIAGNOSIS — M54.16 LUMBAR RADICULOPATHY: ICD-10-CM

## 2021-07-09 DIAGNOSIS — M53.3 SACROILIAC JOINT DYSFUNCTION OF LEFT SIDE: ICD-10-CM

## 2021-07-09 PROCEDURE — 97012 MECHANICAL TRACTION THERAPY: CPT

## 2021-07-09 PROCEDURE — 97110 THERAPEUTIC EXERCISES: CPT

## 2021-07-09 NOTE — OP THERAPY DAILY TREATMENT
"  Outpatient Physical Therapy  DAILY TREATMENT     [facility] Physical Therapy [address].  [address]  [place] [address]  Phone:  [phone]  Fax:  [phone]    Date: 07/09/2021    Patient: Nila Quispe  YOB: 1955  MRN: [id number]     Time Calculation    Start time: 1600  Stop time: 1642 Time Calculation (min): 42 minutes         Chief Complaint: Back Problem    Visit #: 3    SUBJECTIVE:  The pt reports that she did experience some relief of her pain with lumbar traction last session. She is agreeable to PT intervention today.     OBJECTIVE:  Current objective measures: Pt reports \"cold water\" feeling in her foot with standing lumbar extension          Therapeutic Exercises (CPT 40623):     1. Supine TA breathing, 10x3\"    2. Supine bridging , 2x10    3. Supine marches, 10x each side    4. Standing hip extension, 10x each    5. Standing lumbar extension, 10x    6. Gentle figure 4 stretch, 3x30\"    7. LTR, 10x each side    8. Lateral walks, 2 laps      Therapeutic Exercise Summary: Pt educated to continue with current HEP.     Therapeutic Treatments and Modalities:     2. Mechanical Traction (CPT 85487), 80/60 lb in supine on MHP x15 minutes post session    Time-based treatments/modalities:    Physical Therapy Timed Treatment Charges  Therapeutic exercise minutes (CPT 64574): 25 minutes  ASSESSMENT:   Response to treatment: The pt is responding well to lumbar traction with notable improvements in her pain levels and activity tolerance. She tolerated a progression in therex for lumbar mobility and gentle activation exercises, but does continue to have significant sensitivity of her symptoms. She will continue to benefit from skilled physical therapy intervention to maximize her return to PLOF.     PLAN/RECOMMENDATIONS:   Plan for treatment: therapy treatment to continue next visit.  Planned interventions for next visit: continue with current treatment. Continue to " progress lumbar mobility and core strength.

## 2021-07-14 ENCOUNTER — APPOINTMENT (OUTPATIENT)
Dept: PHYSICAL THERAPY | Facility: REHABILITATION | Age: 66
End: 2021-07-14
Attending: FAMILY MEDICINE
Payer: COMMERCIAL

## 2021-07-16 ENCOUNTER — PHYSICAL THERAPY (OUTPATIENT)
Dept: PHYSICAL THERAPY | Facility: REHABILITATION | Age: 66
End: 2021-07-16
Attending: FAMILY MEDICINE
Payer: COMMERCIAL

## 2021-07-16 DIAGNOSIS — M54.16 LUMBAR RADICULOPATHY: ICD-10-CM

## 2021-07-16 DIAGNOSIS — M53.3 SACROILIAC JOINT DYSFUNCTION OF LEFT SIDE: ICD-10-CM

## 2021-07-16 PROCEDURE — 97110 THERAPEUTIC EXERCISES: CPT

## 2021-07-16 PROCEDURE — 97012 MECHANICAL TRACTION THERAPY: CPT

## 2021-07-16 NOTE — OP THERAPY DAILY TREATMENT
"  Outpatient Physical Therapy  DAILY TREATMENT     [facility] Physical [address].  [address]  [place] [address]  Phone:  [phone]  Fax:  [phone]    Date: 07/16/2021    Patient: Nila Quispe  YOB: 1955  MRN: [id number]     Time Calculation    Start time: 1600  Stop time: 1650 Time Calculation (min): 50 minutes         Chief Complaint: Back Problem    Visit #: 4    SUBJECTIVE:  The pt states that her leg pain was present last night but overall she is experiencing improvements since starting physical therapy. She is agreeable to PT today.     OBJECTIVE:  Current objective measures: Pt reports radicular symptoms to her L glute, not distal to knee          Therapeutic Exercises (CPT 16530):     2. Supine bridging , 2x10    3. Supine marches, 10x each side, Pain in L glute    4. Standing hip extension, 10x each    5. Standing lumbar extension, 10x    6. Gentle figure 4 stretch, 3x30\"    7. LTR, 20x    9. Standing posture extension angels at wall, 10x    10. Supine hamstring stretch, 3x30\"each    11. Ball squats at wall, 10x      Therapeutic Exercise Summary: Pt educated to continue with current HEP.     Therapeutic Treatments and Modalities:     2. Mechanical Traction (CPT 88861), 80/60 lb in supine on MHP x15 minutes pre session    Time-based treatments/modalities:    Physical Therapy Timed Treatment Charges  Therapeutic exercise minutes (CPT 77858): 30 minutes    ASSESSMENT:   Response to treatment: The pt continues to report improvement in her radicular symptoms with lumbar traction. She did tolerate a progression in therex for lumbar mobility and core stability but does continue to report radicular symptoms with various exercises, but not distal to the knee. She expresses good adherence to her HEP and will continue to benefit from skilled physical therapy intervention to maximize her CLOF and quality of life.     PLAN/RECOMMENDATIONS:   Plan for treatment: " therapy treatment to continue next visit.  Planned interventions for next visit: continue with current treatment. Continue to progress pt's tolerance to lumbar mobility exercises and core stabilization.

## 2021-07-21 ENCOUNTER — PHYSICAL THERAPY (OUTPATIENT)
Dept: PHYSICAL THERAPY | Facility: REHABILITATION | Age: 66
End: 2021-07-21
Attending: FAMILY MEDICINE
Payer: COMMERCIAL

## 2021-07-21 DIAGNOSIS — M54.16 LUMBAR RADICULOPATHY: ICD-10-CM

## 2021-07-21 DIAGNOSIS — M17.11 PRIMARY OSTEOARTHRITIS OF RIGHT KNEE: ICD-10-CM

## 2021-07-21 DIAGNOSIS — M53.3 SACROILIAC JOINT DYSFUNCTION OF LEFT SIDE: ICD-10-CM

## 2021-07-21 PROCEDURE — 97012 MECHANICAL TRACTION THERAPY: CPT

## 2021-07-21 PROCEDURE — 97110 THERAPEUTIC EXERCISES: CPT

## 2021-07-21 NOTE — OP THERAPY DAILY TREATMENT
"  Outpatient Physical Therapy  DAILY TREATMENT     [facility] Physical [address].  [address]  [place] [address]  Phone:  [phone]  Fax:  [phone]    Date: 07/21/2021    Patient: Nila Quispe  YOB: 1955  MRN: [id number]     Time Calculation    Start time: 1617  Stop time: 1705 Time Calculation (min): 48 minutes         Chief Complaint: Back Problem    Visit #: 5    SUBJECTIVE:  The pt states that her back is a little better, but her foot is still very uncomfortable. She states that her symptoms continue to worsen with prolonged standing required for her job. She states that she is nervous about considering surgery, but will consider the option at re-examination next visit.  The pt is agreeable to PT today.     OBJECTIVE:  Current objective measures: Pt reports peripheralization of symptoms with minimal lumbar extension and flexion          Therapeutic Exercises (CPT 24666):     1. Standing lumbar extension, 10x, Peripheralization of symptoms    2. SKTC, 10x5\"    3. Supine hamstring stretch, 3x30\"each    4. Standing hip extension, 10x each    5. Bridges, 2x10    6. Gentle figure 4 stretch, 3x30\"    7. LTR, 20x    20. POC: 07/01/2021 to  08/26/21      Therapeutic Exercise Summary: Pt educated to continue with current HEP.     Therapeutic Treatments and Modalities:     2. Mechanical Traction (CPT 23877), 80/60 lb in supine on MHP x15 minutes pre session    Time-based treatments/modalities:    Physical Therapy Timed Treatment Charges  Therapeutic exercise minutes (CPT 22146): 25 minutes    ASSESSMENT:   Response to treatment: The pt had more sensitive radicular symptoms this session with attempted therex. She did report improvement in her symptoms with bridges. She reports relief with lumbar traction but carryover of symptom improvement is minimal between sessions. Will complete re-examination next visit to determine pts ongoing need/benefit for therapy. "     PLAN/RECOMMENDATIONS:   Plan for treatment: therapy treatment to continue next visit.  Planned interventions for next visit: continue with current treatment. Complete re-examination next visit.

## 2021-07-22 ENCOUNTER — OFFICE VISIT (OUTPATIENT)
Dept: URGENT CARE | Facility: PHYSICIAN GROUP | Age: 66
End: 2021-07-22
Payer: COMMERCIAL

## 2021-07-22 VITALS
DIASTOLIC BLOOD PRESSURE: 74 MMHG | HEART RATE: 78 BPM | SYSTOLIC BLOOD PRESSURE: 138 MMHG | WEIGHT: 189 LBS | OXYGEN SATURATION: 96 % | TEMPERATURE: 97.5 F | RESPIRATION RATE: 24 BRPM | HEIGHT: 63 IN | BODY MASS INDEX: 33.49 KG/M2

## 2021-07-22 DIAGNOSIS — L50.9 URTICARIA: ICD-10-CM
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
## 2021-07-22 PROCEDURE — 99213 OFFICE O/P EST LOW 20 MIN: CPT | Performed by: NURSE PRACTITIONER

## 2021-07-22 RX ORDER — LORATADINE 10 MG/1
10 CAPSULE, LIQUID FILLED ORAL DAILY
Qty: 30 CAPSULE | Refills: 0 | Status: SHIPPED | OUTPATIENT
Start: 2021-07-22 | End: 2021-07-29

## 2021-07-22 RX ORDER — TRIAMCINOLONE ACETONIDE 1 MG/G
CREAM TOPICAL
Qty: 15 G | Refills: 0 | Status: SHIPPED | OUTPATIENT
Start: 2021-07-22 | End: 2021-08-13

## 2021-07-22 RX ORDER — HYDROXYZINE HYDROCHLORIDE 25 MG/1
25 TABLET, FILM COATED ORAL 3 TIMES DAILY PRN
Qty: 30 TABLET | Refills: 0 | Status: SHIPPED | OUTPATIENT
Start: 2021-07-22 | End: 2021-08-13

## 2021-07-22 ASSESSMENT — ENCOUNTER SYMPTOMS
TINGLING: 0
WEAKNESS: 0
VOMITING: 0
CONSTIPATION: 0
SENSORY CHANGE: 0
PALPITATIONS: 0
DIZZINESS: 0
ORTHOPNEA: 0
NAUSEA: 0
SHORTNESS OF BREATH: 0
WHEEZING: 0
ABDOMINAL PAIN: 0
HEADACHES: 0
MYALGIAS: 0
CHILLS: 0
FEVER: 0
DIARRHEA: 0

## 2021-07-22 ASSESSMENT — FIBROSIS 4 INDEX: FIB4 SCORE: 1.44561264464840198

## 2021-07-23 NOTE — PROGRESS NOTES
Subjective:      Nila Quispe is a 65 y.o. female who presents with Rash (legs, arms;onset since 3rd of July. ), Headache (2x days), and Chest Pain (Pt states that it might be anxiety that might be giving her this chest pain from all the itching. )            HPI  Recurring insect bites to arms and legs since 7/3/21. Urticaria. Has tried to use over the counter anti-itch cremes but does not help. Admits to scratching bites. States unable to sleep as the itching keeps her up. Intermittent headaches and anxiety has increased with the excessive itching.     PMH:  has a past medical history of Hyperlipidemia and Hypertension.  MEDS:   Current Outpatient Medications:   •  triamcinolone acetonide (KENALOG) 0.1 % Cream, Apply thin application to affected areas twice daily x 7 days., Disp: 15 g, Rfl: 0  •  hydrOXYzine HCl (ATARAX) 25 MG Tab, Take 1 tablet by mouth 3 times a day as needed for Itching. Causes drowsiness, do not drive or work while using. Caution with use with other sedating medications., Disp: 30 tablet, Rfl: 0  •  Loratadine (CLARITIN) 10 MG Cap, Take 10 mg by mouth every day for 7 days., Disp: 30 capsule, Rfl: 0  •  metoprolol SR (TOPROL XL) 25 MG TABLET SR 24 HR, TOME BRANDY TABLETA TODOS LOS WHITFIELD, Disp: 90 tablet, Rfl: 0  •  atorvastatin (LIPITOR) 20 MG Tab, TOME BRANDY TABLETA TODOS [place] WHITFIELD EN LA NOCHE, Disp: 90 tablet, Rfl: 3  •  azithromycin (ZITHROMAX) 250 MG Tab, 2 tablets orally once day number one and then 1 tablet orally every day for 4 days (Patient not taking: Reported on 7/22/2021), Disp: 6 tablet, Rfl: 0  •  benzonatate (TESSALON) 100 MG Cap, Take 1 capsule by mouth 3 times a day as needed for Cough. (Patient not taking: Reported on 7/22/2021), Disp: 30 capsule, Rfl: 0  •  meloxicam (MOBIC) 15 MG tablet, Take 1 tablet by mouth every day. (Patient not taking: Reported on 7/22/2021), Disp: 30 tablet, Rfl: 0  •  diclofenac sodium 1 % Gel, Apply 1 Application topically 4 times a day as  "needed (knee pain). Do not use beyond 21 days, Disp: 100 g, Rfl: 0  •  Phenazopyridine HCl (AZO TABS PO), Take  by mouth. (Patient not taking: Reported on 7/22/2021), Disp: , Rfl:   ALLERGIES:   Allergies   Allergen Reactions   • Penicillins Hives     SURGHX:   Past Surgical History:   Procedure Laterality Date   • CHOLECYSTECTOMY  2009   • BREAST BIOPSY     • PRIMARY C SECTION      3 times, last one in 1986   • US-NEEDLE CORE BX-BREAST PANEL Left      SOCHX:  reports that she has never smoked. She has never used smokeless tobacco. She reports previous alcohol use. She reports that she does not use drugs.  FH: Family history was reviewed, no pertinent findings to report    Review of Systems   Constitutional: Negative for chills, fever and malaise/fatigue.   Respiratory: Negative for shortness of breath and wheezing.    Cardiovascular: Negative for chest pain, palpitations and orthopnea.   Gastrointestinal: Negative for abdominal pain, constipation, diarrhea, nausea and vomiting.   Musculoskeletal: Negative for myalgias.   Skin: Positive for itching and rash.   Neurological: Negative for dizziness, tingling, sensory change, weakness and headaches.   Endo/Heme/Allergies: Negative for environmental allergies.   All other systems reviewed and are negative.         Objective:     /74 (BP Location: Left arm, Patient Position: Sitting, BP Cuff Size: Adult)   Pulse 78   Temp 36.4 °C (97.5 °F) (Temporal)   Resp (!) 24   Ht 1.6 m (5' 3\")   Wt 85.7 kg (189 lb)   LMP  (LMP Unknown)   SpO2 96%   BMI 33.48 kg/m²      Physical Exam  Vitals reviewed.   Constitutional:       General: She is awake. She is not in acute distress.     Appearance: Normal appearance. She is well-developed. She is not ill-appearing, toxic-appearing or diaphoretic.   HENT:      Head: Normocephalic.   Eyes:      General: Lids are normal.      Extraocular Movements: Extraocular movements intact.      Conjunctiva/sclera: Conjunctivae normal.      " Pupils: Pupils are equal, round, and reactive to light.   Cardiovascular:      Rate and Rhythm: Normal rate.   Pulmonary:      Effort: Pulmonary effort is normal.   Musculoskeletal:         General: Normal range of motion.   Skin:     General: Skin is warm and dry.      Findings: Erythema and rash present. Rash is macular and urticarial.      Comments: Multiple macular welt-like rash on both arms. Patient actively scratching rash.    Neurological:      Mental Status: She is alert and oriented to person, place, and time.   Psychiatric:         Behavior: Behavior normal. Behavior is cooperative.                        Assessment/Plan:        1. Urticaria    - triamcinolone acetonide (KENALOG) 0.1 % Cream; Apply thin application to affected areas twice daily x 7 days.  Dispense: 15 g; Refill: 0  - hydrOXYzine HCl (ATARAX) 25 MG Tab; Take 1 tablet by mouth 3 times a day as needed for Itching. Causes drowsiness, do not drive or work while using. Caution with use with other sedating medications.  Dispense: 30 tablet; Refill: 0  - Loratadine (CLARITIN) 10 MG Cap; Take 10 mg by mouth every day for 7 days.  Dispense: 30 capsule; Refill: 0    2. Insect bite, unspecified site, initial encounter    - triamcinolone acetonide (KENALOG) 0.1 % Cream; Apply thin application to affected areas twice daily x 7 days.  Dispense: 15 g; Refill: 0  - hydrOXYzine HCl (ATARAX) 25 MG Tab; Take 1 tablet by mouth 3 times a day as needed for Itching. Causes drowsiness, do not drive or work while using. Caution with use with other sedating medications.  Dispense: 30 tablet; Refill: 0  - Loratadine (CLARITIN) 10 MG Cap; Take 10 mg by mouth every day for 7 days.  Dispense: 30 capsule; Refill: 0      -May clean area with mild soap, do not scrub, wash with tepid water, pat dry  -May moisturize skin with neutral lotion as needed for any dry skin  -Stay indoors at dusk and early morning hours  -May use insect repellent when outdoors  -May wear long  sleeves and pants when outdoors as needed   -Monitor for increase in rash size or areas affected, pain, itchiness, redness with blistering, fever, SOB in next 24 hours- re-evaluate

## 2021-08-13 ENCOUNTER — OFFICE VISIT (OUTPATIENT)
Dept: URGENT CARE | Facility: PHYSICIAN GROUP | Age: 66
End: 2021-08-13
Payer: COMMERCIAL

## 2021-08-13 ENCOUNTER — HOSPITAL ENCOUNTER (OUTPATIENT)
Facility: MEDICAL CENTER | Age: 66
End: 2021-08-13
Attending: FAMILY MEDICINE
Payer: COMMERCIAL

## 2021-08-13 VITALS
WEIGHT: 189 LBS | TEMPERATURE: 97.8 F | HEART RATE: 88 BPM | DIASTOLIC BLOOD PRESSURE: 70 MMHG | RESPIRATION RATE: 16 BRPM | OXYGEN SATURATION: 97 % | BODY MASS INDEX: 33.49 KG/M2 | HEIGHT: 63 IN | SYSTOLIC BLOOD PRESSURE: 112 MMHG

## 2021-08-13 DIAGNOSIS — N30.00 ACUTE CYSTITIS WITHOUT HEMATURIA: ICD-10-CM

## 2021-08-13 LAB
APPEARANCE UR: NORMAL
BILIRUB UR STRIP-MCNC: NORMAL MG/DL
COLOR UR AUTO: NORMAL
GLUCOSE UR STRIP.AUTO-MCNC: NORMAL MG/DL
KETONES UR STRIP.AUTO-MCNC: NORMAL MG/DL
LEUKOCYTE ESTERASE UR QL STRIP.AUTO: NORMAL
NITRITE UR QL STRIP.AUTO: NORMAL
PH UR STRIP.AUTO: 6 [PH] (ref 5–8)
PROT UR QL STRIP: 30 MG/DL
RBC UR QL AUTO: NORMAL
SP GR UR STRIP.AUTO: 1.02
UROBILINOGEN UR STRIP-MCNC: 0.2 MG/DL

## 2021-08-13 PROCEDURE — 81002 URINALYSIS NONAUTO W/O SCOPE: CPT | Performed by: FAMILY MEDICINE

## 2021-08-13 PROCEDURE — 87186 SC STD MICRODIL/AGAR DIL: CPT

## 2021-08-13 PROCEDURE — 87086 URINE CULTURE/COLONY COUNT: CPT

## 2021-08-13 PROCEDURE — 99213 OFFICE O/P EST LOW 20 MIN: CPT | Performed by: FAMILY MEDICINE

## 2021-08-13 PROCEDURE — 87077 CULTURE AEROBIC IDENTIFY: CPT

## 2021-08-13 RX ORDER — NITROFURANTOIN 25; 75 MG/1; MG/1
100 CAPSULE ORAL 2 TIMES DAILY
Qty: 10 CAPSULE | Refills: 0 | Status: SHIPPED | OUTPATIENT
Start: 2021-08-13 | End: 2021-08-18

## 2021-08-13 RX ORDER — PHENAZOPYRIDINE HYDROCHLORIDE 200 MG/1
200 TABLET, FILM COATED ORAL 3 TIMES DAILY PRN
Qty: 6 TABLET | Refills: 0 | Status: SHIPPED | OUTPATIENT
Start: 2021-08-13 | End: 2022-04-22

## 2021-08-13 RX ORDER — GABAPENTIN 300 MG/1
300 CAPSULE ORAL 3 TIMES DAILY
COMMUNITY
Start: 2021-08-05 | End: 2022-05-19

## 2021-08-13 RX ORDER — LORATADINE 10 MG/1
10 TABLET ORAL DAILY
Status: ON HOLD | COMMUNITY
Start: 2021-07-22 | End: 2022-04-26

## 2021-08-13 ASSESSMENT — ENCOUNTER SYMPTOMS
EYE REDNESS: 0
VOMITING: 0
MYALGIAS: 0
NAUSEA: 0
EYE DISCHARGE: 0
WEIGHT LOSS: 0

## 2021-08-13 ASSESSMENT — FIBROSIS 4 INDEX: FIB4 SCORE: 1.44561264464840198

## 2021-08-13 NOTE — PROGRESS NOTES
"Subjective     Nila Quispe is a 65 y.o. female who presents with UTI (Pt reports dysuria, lower pelvic pain, lower back pain, chills, headaches, Onset 2 weeks.  )            History per .  2 weeks urinary burning urgency and frequency.  No hematuria.  Pelvic pressure and low back pain.  Symptoms are worsening over the last few days.  PMH cystitis and symptoms are the same.  No PMH pyelonephritis.  No fever or chills.  Bowel movements are normal.  No other aggravating or alleviating factors.      Review of Systems   Constitutional: Negative for malaise/fatigue and weight loss.   Eyes: Negative for discharge and redness.   Gastrointestinal: Negative for nausea and vomiting.   Musculoskeletal: Negative for joint pain and myalgias.   Skin: Negative for itching and rash.              Objective     /70 (BP Location: Left arm, Patient Position: Sitting, BP Cuff Size: Large adult)   Pulse 88   Temp 36.6 °C (97.8 °F) (Temporal)   Resp 16   Ht 1.6 m (5' 3\")   Wt 85.7 kg (189 lb)   LMP  (LMP Unknown)   SpO2 97%   BMI 33.48 kg/m²      Physical Exam  Constitutional:       General: She is not in acute distress.     Appearance: She is well-developed.   HENT:      Head: Normocephalic and atraumatic.   Eyes:      Conjunctiva/sclera: Conjunctivae normal.   Cardiovascular:      Rate and Rhythm: Normal rate and regular rhythm.      Heart sounds: Normal heart sounds. No murmur heard.     Pulmonary:      Effort: Pulmonary effort is normal.      Breath sounds: Normal breath sounds. No wheezing.   Abdominal:      Palpations: Abdomen is soft.      Tenderness: There is no right CVA tenderness or left CVA tenderness.      Comments: Suprapubic tenderness.   Skin:     General: Skin is warm and dry.      Findings: No rash.   Neurological:      Mental Status: She is alert and oriented to person, place, and time.                             Assessment & Plan        1. Acute cystitis without hematuria  " nitrofurantoin (MACROBID) 100 MG Cap    phenazopyridine (PYRIDIUM) 200 MG Tab    URINE CULTURE(NEW)     Differential diagnosis, natural history, supportive care, and indications for immediate follow-up discussed at length.     Push fluids.  Follow-up culture.

## 2021-08-15 RX ORDER — SULFAMETHOXAZOLE AND TRIMETHOPRIM 800; 160 MG/1; MG/1
1 TABLET ORAL EVERY 12 HOURS
Qty: 14 TABLET | Refills: 0 | Status: SHIPPED | OUTPATIENT
Start: 2021-08-15 | End: 2021-08-22

## 2021-08-15 RX ORDER — PHENAZOPYRIDINE HYDROCHLORIDE 200 MG/1
200 TABLET, FILM COATED ORAL 3 TIMES DAILY PRN
Qty: 6 TABLET | Refills: 0 | Status: SHIPPED | OUTPATIENT
Start: 2021-08-15 | End: 2022-04-22

## 2021-08-15 NOTE — PROGRESS NOTES
Culture Klebsiella positive.  Intermediate sensitivity to nitrofurantoin.  Discussed with patient through  line and she notes that she remains symptomatic.  Will Rx Bactrim.

## 2021-12-27 ENCOUNTER — TELEPHONE (OUTPATIENT)
Dept: PHYSICAL THERAPY | Facility: REHABILITATION | Age: 66
End: 2021-12-27

## 2021-12-27 NOTE — OP THERAPY DISCHARGE SUMMARY
Outpatient Physical Therapy  DISCHARGE SUMMARY NOTE      [facility] Therapy [address]  [address]  [place] [address]  Phone:  [phone]  Fax:  [phone]    Date of Visit: 12/27/2021    Patient: Nila Quispe  YOB: 1955  MRN: [id number]     Referring Provider: Payam Joel M.D.  [address] [place] [address] [place] [address]   Referring Diagnosis Primary osteoarthritis of right knee [M17.11];Sacroiliac joint dysfunction of left side [M53.3]           Your patient is being discharged from Physical Therapy with the following comments:   · Patient has failed to schedule or reschedule follow-up visits    Comments:  Nila Quispe was seen for 5 PT visits between 7/1/21-7/21/21. The pt did not schedule follow up visits following her visit on 7/21/21. The pt will be discharged due to the time since last visit.      Recommendations:  Recommend that pt request a new PT referral if further PT intervention is needed.     Saundra Whittaker, PT    Date: 12/27/2021

## 2022-03-21 ENCOUNTER — HOSPITAL ENCOUNTER (EMERGENCY)
Facility: MEDICAL CENTER | Age: 67
End: 2022-03-21
Attending: EMERGENCY MEDICINE
Payer: COMMERCIAL

## 2022-03-21 VITALS
OXYGEN SATURATION: 96 % | TEMPERATURE: 96 F | HEIGHT: 63 IN | RESPIRATION RATE: 18 BRPM | WEIGHT: 195.33 LBS | SYSTOLIC BLOOD PRESSURE: 174 MMHG | HEART RATE: 60 BPM | DIASTOLIC BLOOD PRESSURE: 88 MMHG | BODY MASS INDEX: 34.61 KG/M2

## 2022-03-21 DIAGNOSIS — M54.10 RADICULOPATHY, UNSPECIFIED SPINAL REGION: ICD-10-CM

## 2022-03-21 DIAGNOSIS — S39.012A STRAIN OF LUMBAR REGION, INITIAL ENCOUNTER: ICD-10-CM

## 2022-03-21 PROCEDURE — A9270 NON-COVERED ITEM OR SERVICE: HCPCS | Performed by: EMERGENCY MEDICINE

## 2022-03-21 PROCEDURE — 99284 EMERGENCY DEPT VISIT MOD MDM: CPT

## 2022-03-21 PROCEDURE — 700102 HCHG RX REV CODE 250 W/ 637 OVERRIDE(OP): Performed by: EMERGENCY MEDICINE

## 2022-03-21 PROCEDURE — 700111 HCHG RX REV CODE 636 W/ 250 OVERRIDE (IP): Performed by: EMERGENCY MEDICINE

## 2022-03-21 PROCEDURE — 700101 HCHG RX REV CODE 250: Performed by: EMERGENCY MEDICINE

## 2022-03-21 PROCEDURE — 96372 THER/PROPH/DIAG INJ SC/IM: CPT

## 2022-03-21 RX ORDER — CYCLOBENZAPRINE HCL 10 MG
10 TABLET ORAL ONCE
Status: COMPLETED | OUTPATIENT
Start: 2022-03-21 | End: 2022-03-21

## 2022-03-21 RX ORDER — PREDNISONE 10 MG/1
40 TABLET ORAL ONCE
Status: COMPLETED | OUTPATIENT
Start: 2022-03-21 | End: 2022-03-21

## 2022-03-21 RX ORDER — HYDROCODONE BITARTRATE AND ACETAMINOPHEN 5; 325 MG/1; MG/1
1 TABLET ORAL ONCE
Status: COMPLETED | OUTPATIENT
Start: 2022-03-21 | End: 2022-03-21

## 2022-03-21 RX ORDER — PREDNISONE 20 MG/1
40 TABLET ORAL DAILY
Qty: 10 TABLET | Refills: 0 | Status: SHIPPED | OUTPATIENT
Start: 2022-03-21 | End: 2022-03-26

## 2022-03-21 RX ORDER — LIDOCAINE 50 MG/G
1 PATCH TOPICAL ONCE
Status: DISCONTINUED | OUTPATIENT
Start: 2022-03-21 | End: 2022-03-21 | Stop reason: HOSPADM

## 2022-03-21 RX ORDER — LIDOCAINE 50 MG/G
1 PATCH TOPICAL EVERY 24 HOURS
Qty: 10 PATCH | Refills: 0 | Status: SHIPPED | OUTPATIENT
Start: 2022-03-21 | End: 2022-04-22

## 2022-03-21 RX ORDER — CYCLOBENZAPRINE HCL 10 MG
10 TABLET ORAL 3 TIMES DAILY PRN
Qty: 30 TABLET | Refills: 0 | Status: ON HOLD | OUTPATIENT
Start: 2022-03-21 | End: 2022-04-26

## 2022-03-21 RX ORDER — KETOROLAC TROMETHAMINE 30 MG/ML
30 INJECTION, SOLUTION INTRAMUSCULAR; INTRAVENOUS ONCE
Status: COMPLETED | OUTPATIENT
Start: 2022-03-21 | End: 2022-03-21

## 2022-03-21 RX ORDER — NAPROXEN 500 MG/1
500 TABLET ORAL 2 TIMES DAILY WITH MEALS
Qty: 30 TABLET | Refills: 0 | Status: SHIPPED | OUTPATIENT
Start: 2022-03-21 | End: 2022-04-22

## 2022-03-21 RX ADMIN — CYCLOBENZAPRINE 10 MG: 10 TABLET, FILM COATED ORAL at 14:54

## 2022-03-21 RX ADMIN — KETOROLAC TROMETHAMINE 30 MG: 30 INJECTION, SOLUTION INTRAMUSCULAR at 14:55

## 2022-03-21 RX ADMIN — HYDROCODONE BITARTRATE AND ACETAMINOPHEN 1 TABLET: 5; 325 TABLET ORAL at 14:54

## 2022-03-21 RX ADMIN — PREDNISONE 40 MG: 10 TABLET ORAL at 14:57

## 2022-03-21 RX ADMIN — LIDOCAINE 1 PATCH: 50 PATCH TOPICAL at 14:55

## 2022-03-21 ASSESSMENT — ENCOUNTER SYMPTOMS
SENSORY CHANGE: 0
ABDOMINAL PAIN: 0
NAUSEA: 0
BACK PAIN: 1
FEVER: 0
VOMITING: 0
CHILLS: 0

## 2022-03-21 NOTE — ED NOTES
"D/c instructions, prescriptions and work note reviewed with pt using Wolof interpretor gisell ([id number]). Pt to f/u with \"back\" doctor,   meds at [facility], return for worsening s/s. Questions answered, pt denies further needs  "

## 2022-03-21 NOTE — ED PROVIDER NOTES
ED Provider Note    Primary care provider: John Lockett M.D.  Means of arrival: private vehicle  History obtained from: patient  History limited by: none    CHIEF COMPLAINT  Chief Complaint   Patient presents with   • Low Back Pain     X 1 month Getting worse Radiating down RLE       HPI  Nila Quispe is a 66 y.o. female who presents to the Emergency Department for evaluation of low back pain.  Patient reports that she has chronic low back pain that radiates down her legs.  She is followed by a spine specialist (she cannot recall the name) and is prescribed gabapentin.  She has not yet had an MRI but is being managed conservatively.  Patient reports that while working today her pain acutely worsened but it has been progressively worsening over the last few days.  She reports that the pain is mostly in the right lower back radiating down her right leg.  The pain is moderate and sharp pressure.  She denies numbness, tingling or weakness.  She is not having urinary retention.  She denies fevers, chills, chest pain or shortness of breath.  She has no other acute complaints at this time.    REVIEW OF SYSTEMS  Review of Systems   Constitutional: Negative for chills and fever.   Cardiovascular: Negative for chest pain.   Gastrointestinal: Negative for abdominal pain, nausea and vomiting.   Genitourinary: Negative for dysuria.   Musculoskeletal: Positive for back pain.   Neurological: Negative for sensory change.   All other systems reviewed and are negative.        PAST MEDICAL HISTORY   has a past medical history of Hyperlipidemia and Hypertension.    SURGICAL HISTORY   has a past surgical history that includes breast biopsy; us-needle core bx-breast panel (Left); cholecystectomy (2009); and primary c section.    SOCIAL HISTORY  Social History     Tobacco Use   • Smoking status: Never Smoker   • Smokeless tobacco: Never Used   Vaping Use   • Vaping Use: Never used   Substance Use Topics   • Alcohol  "use: Not Currently   • Drug use: No      Social History     Substance and Sexual Activity   Drug Use No       FAMILY HISTORY  Family History   Problem Relation Age of Onset   • Cancer Mother         Cervical Cancer   • Heart Attack Father        CURRENT MEDICATIONS  Metoprolol, gabapentin    ALLERGIES  Allergies   Allergen Reactions   • Penicillins Hives       PHYSICAL EXAM  VITAL SIGNS: /88   Pulse 74   Temp 36.4 °C (97.6 °F) (Temporal)   Resp 16   Ht 1.6 m (5' 3\")   Wt 88.6 kg (195 lb 5.2 oz)   LMP  (LMP Unknown)   SpO2 97%   BMI 34.60 kg/m²   Vitals reviewed by myself.  Physical Exam  Nursing note and vitals reviewed.  Constitutional: Well-developed and well-nourished. No acute distress.   HENT: Head is normocephalic and atraumatic.  Eyes: extra-ocular movements intact  Cardiovascular: regular rate and regular rhythm. No murmur heard.  Pulmonary/Chest: Breath sounds normal. No wheezes or rales.   Abdominal: Soft and non-tender. No distention.    Musculoskeletal: Extremities exhibit normal range of motion without edema or tenderness. Patient ambulates with a steady gait. No midline spinal tenderness. Right lumbar paraspinal muscle tenderness  Neurological: Awake and alert, sensation intact in bilateral lower extremities  Skin: Skin is warm and dry. No rash.       DIAGNOSTIC STUDIES /  LABS  none    COURSE & MEDICAL DECISION MAKING  Nursing notes, VS, PMSFHx reviewed in chart.    Patient is a 66-year-old female who comes in for evaluation of back pain.  Differential diagnosis includes sprain, strain, radiculopathy.  Patient has no red flag signs or symptoms concerning for acute spinal cord impingement, she is ambulating with a stable gait and neurologically intact, therefore emergent MRI of the spine is not indicated.  No trauma that would indicate bony abnormality and therefore CT of the spine is not indicated.  Patient is otherwise well-appearing with vitals in normal limits.  I will treat her pain " and discomfort and I have advised her that she will ultimately need follow-up with her spinal doctor again and she is amenable to this plan.    Patient is treated with prednisone, Flexeril, Norco, lidocaine patch and Toradol after which she feels improved.  Therefore she is reassured and advised on management of pain at home.  She is provided with prescriptions for naproxen, Flexeril, 5-day course of prednisone and lidocaine patches and given strict return precautions.  Patient is then discharged in stable condition.      FINAL IMPRESSION  1. Strain of lumbar region, initial encounter    2. Radiculopathy, unspecified spinal region

## 2022-03-21 NOTE — ED NOTES
Plan of care discussed with pt as well as meds given using shadia linda (#[id number]). Pt c/o rt sided groin pain and around to back with bilat leg weakness 5-10/10. meds given with crackers, lidocaine patch to rt groin per pt request

## 2022-03-22 ENCOUNTER — TELEPHONE (OUTPATIENT)
Dept: MEDICAL GROUP | Facility: PHYSICIAN GROUP | Age: 67
End: 2022-03-22
Payer: COMMERCIAL

## 2022-03-22 NOTE — TELEPHONE ENCOUNTER
Used  Lindsay (#[id number]), phone conversation with patient, scheduled ED follow-up visit with her primary care provider for 4/14/22.     Patient is feeling a little bit better following her ED visit on 3/21/22.  Has appointment with spine doctor on 4/7/22, wants to see her PCP after this visit.  Also waiting for insurance approval for MRI.

## 2022-04-22 ENCOUNTER — PRE-ADMISSION TESTING (OUTPATIENT)
Dept: ADMISSIONS | Facility: MEDICAL CENTER | Age: 67
End: 2022-04-22
Attending: NEUROLOGICAL SURGERY
Payer: COMMERCIAL

## 2022-04-22 ENCOUNTER — HOSPITAL ENCOUNTER (OUTPATIENT)
Dept: RADIOLOGY | Facility: MEDICAL CENTER | Age: 67
End: 2022-04-22
Attending: NEUROLOGICAL SURGERY | Admitting: NEUROLOGICAL SURGERY
Payer: COMMERCIAL

## 2022-04-22 DIAGNOSIS — Z01.810 PRE-OPERATIVE CARDIOVASCULAR EXAMINATION: ICD-10-CM

## 2022-04-22 DIAGNOSIS — Z01.811 PRE-OPERATIVE RESPIRATORY EXAMINATION: ICD-10-CM

## 2022-04-22 DIAGNOSIS — Z01.812 PRE-OPERATIVE LABORATORY EXAMINATION: ICD-10-CM

## 2022-04-22 LAB
ANION GAP SERPL CALC-SCNC: 11 MMOL/L (ref 7–16)
APTT PPP: 27.8 SEC (ref 24.7–36)
BASOPHILS # BLD AUTO: 0.8 % (ref 0–1.8)
BASOPHILS # BLD: 0.06 K/UL (ref 0–0.12)
BUN SERPL-MCNC: 8 MG/DL (ref 8–22)
CALCIUM SERPL-MCNC: 8.8 MG/DL (ref 8.5–10.5)
CHLORIDE SERPL-SCNC: 105 MMOL/L (ref 96–112)
CO2 SERPL-SCNC: 24 MMOL/L (ref 20–33)
CREAT SERPL-MCNC: 0.65 MG/DL (ref 0.5–1.4)
EKG IMPRESSION: NORMAL
EOSINOPHIL # BLD AUTO: 0.12 K/UL (ref 0–0.51)
EOSINOPHIL NFR BLD: 1.6 % (ref 0–6.9)
ERYTHROCYTE [DISTWIDTH] IN BLOOD BY AUTOMATED COUNT: 44.4 FL (ref 35.9–50)
GFR SERPLBLD CREATININE-BSD FMLA CKD-EPI: 97 ML/MIN/1.73 M 2
GLUCOSE SERPL-MCNC: 104 MG/DL (ref 65–99)
HCT VFR BLD AUTO: 40.8 % (ref 37–47)
HGB BLD-MCNC: 13.3 G/DL (ref 12–16)
IMM GRANULOCYTES # BLD AUTO: 0.04 K/UL (ref 0–0.11)
IMM GRANULOCYTES NFR BLD AUTO: 0.5 % (ref 0–0.9)
INR PPP: 0.97 (ref 0.87–1.13)
LYMPHOCYTES # BLD AUTO: 2.45 K/UL (ref 1–4.8)
LYMPHOCYTES NFR BLD: 32.3 % (ref 22–41)
MCH RBC QN AUTO: 30.2 PG (ref 27–33)
MCHC RBC AUTO-ENTMCNC: 32.6 G/DL (ref 33.6–35)
MCV RBC AUTO: 92.5 FL (ref 81.4–97.8)
MONOCYTES # BLD AUTO: 0.61 K/UL (ref 0–0.85)
MONOCYTES NFR BLD AUTO: 8 % (ref 0–13.4)
NEUTROPHILS # BLD AUTO: 4.3 K/UL (ref 2–7.15)
NEUTROPHILS NFR BLD: 56.8 % (ref 44–72)
NRBC # BLD AUTO: 0 K/UL
NRBC BLD-RTO: 0 /100 WBC
PLATELET # BLD AUTO: 308 K/UL (ref 164–446)
PMV BLD AUTO: 9.4 FL (ref 9–12.9)
POTASSIUM SERPL-SCNC: 3.7 MMOL/L (ref 3.6–5.5)
PROTHROMBIN TIME: 12.6 SEC (ref 12–14.6)
RBC # BLD AUTO: 4.41 M/UL (ref 4.2–5.4)
SODIUM SERPL-SCNC: 140 MMOL/L (ref 135–145)
WBC # BLD AUTO: 7.6 K/UL (ref 4.8–10.8)

## 2022-04-22 PROCEDURE — 71046 X-RAY EXAM CHEST 2 VIEWS: CPT

## 2022-04-22 PROCEDURE — 85610 PROTHROMBIN TIME: CPT

## 2022-04-22 PROCEDURE — 93005 ELECTROCARDIOGRAM TRACING: CPT

## 2022-04-22 PROCEDURE — 85025 COMPLETE CBC W/AUTO DIFF WBC: CPT

## 2022-04-22 PROCEDURE — 36415 COLL VENOUS BLD VENIPUNCTURE: CPT

## 2022-04-22 PROCEDURE — 85730 THROMBOPLASTIN TIME PARTIAL: CPT

## 2022-04-22 PROCEDURE — 93010 ELECTROCARDIOGRAM REPORT: CPT | Performed by: INTERNAL MEDICINE

## 2022-04-22 PROCEDURE — 80048 BASIC METABOLIC PNL TOTAL CA: CPT

## 2022-04-22 NOTE — OR NURSING
ALL INFORMATION OBTAINED DURING PREADMIT APPOINTMENT WAS DONE WITH PATIENT AND  NNAMDI #[id number].  PATIENT WILL REQUIRE A  DOS.

## 2022-04-26 ENCOUNTER — APPOINTMENT (OUTPATIENT)
Dept: RADIOLOGY | Facility: MEDICAL CENTER | Age: 67
End: 2022-04-26
Attending: NEUROLOGICAL SURGERY
Payer: COMMERCIAL

## 2022-04-26 ENCOUNTER — ANESTHESIA (OUTPATIENT)
Dept: SURGERY | Facility: MEDICAL CENTER | Age: 67
End: 2022-04-26
Payer: COMMERCIAL

## 2022-04-26 ENCOUNTER — ANESTHESIA EVENT (OUTPATIENT)
Dept: SURGERY | Facility: MEDICAL CENTER | Age: 67
End: 2022-04-26
Payer: COMMERCIAL

## 2022-04-26 ENCOUNTER — HOSPITAL ENCOUNTER (OUTPATIENT)
Facility: MEDICAL CENTER | Age: 67
End: 2022-04-29
Attending: NEUROLOGICAL SURGERY | Admitting: NEUROLOGICAL SURGERY
Payer: COMMERCIAL

## 2022-04-26 ENCOUNTER — APPOINTMENT (OUTPATIENT)
Dept: RADIOLOGY | Facility: MEDICAL CENTER | Age: 67
End: 2022-04-26
Attending: INTERNAL MEDICINE
Payer: COMMERCIAL

## 2022-04-26 DIAGNOSIS — G89.18 POSTOPERATIVE PAIN: ICD-10-CM

## 2022-04-26 DIAGNOSIS — M54.16 LUMBAR RADICULOPATHY: ICD-10-CM

## 2022-04-26 PROBLEM — R07.9 CHEST PAIN: Status: ACTIVE | Noted: 2022-04-26

## 2022-04-26 PROBLEM — J96.01 ACUTE RESPIRATORY FAILURE WITH HYPOXIA (HCC): Status: ACTIVE | Noted: 2022-04-26

## 2022-04-26 LAB
EKG IMPRESSION: NORMAL
GLUCOSE BLD STRIP.AUTO-MCNC: 171 MG/DL (ref 65–99)
TROPONIN T SERPL-MCNC: <6 NG/L (ref 6–19)
TROPONIN T SERPL-MCNC: <6 NG/L (ref 6–19)

## 2022-04-26 PROCEDURE — 700102 HCHG RX REV CODE 250 W/ 637 OVERRIDE(OP): Performed by: ANESTHESIOLOGY

## 2022-04-26 PROCEDURE — 700111 HCHG RX REV CODE 636 W/ 250 OVERRIDE (IP): Performed by: ANESTHESIOLOGY

## 2022-04-26 PROCEDURE — 160048 HCHG OR STATISTICAL LEVEL 1-5: Performed by: NEUROLOGICAL SURGERY

## 2022-04-26 PROCEDURE — 160035 HCHG PACU - 1ST 60 MINS PHASE I: Performed by: NEUROLOGICAL SURGERY

## 2022-04-26 PROCEDURE — 160002 HCHG RECOVERY MINUTES (STAT): Performed by: NEUROLOGICAL SURGERY

## 2022-04-26 PROCEDURE — 93005 ELECTROCARDIOGRAM TRACING: CPT | Performed by: NEUROLOGICAL SURGERY

## 2022-04-26 PROCEDURE — 160009 HCHG ANES TIME/MIN: Performed by: NEUROLOGICAL SURGERY

## 2022-04-26 PROCEDURE — 700117 HCHG RX CONTRAST REV CODE 255: Performed by: INTERNAL MEDICINE

## 2022-04-26 PROCEDURE — G0378 HOSPITAL OBSERVATION PER HR: HCPCS

## 2022-04-26 PROCEDURE — 700101 HCHG RX REV CODE 250: Performed by: NEUROLOGICAL SURGERY

## 2022-04-26 PROCEDURE — 700105 HCHG RX REV CODE 258: Performed by: NEUROLOGICAL SURGERY

## 2022-04-26 PROCEDURE — 93010 ELECTROCARDIOGRAM REPORT: CPT | Performed by: INTERNAL MEDICINE

## 2022-04-26 PROCEDURE — 700101 HCHG RX REV CODE 250: Performed by: PHYSICIAN ASSISTANT

## 2022-04-26 PROCEDURE — 160029 HCHG SURGERY MINUTES - 1ST 30 MINS LEVEL 4: Performed by: NEUROLOGICAL SURGERY

## 2022-04-26 PROCEDURE — 84484 ASSAY OF TROPONIN QUANT: CPT

## 2022-04-26 PROCEDURE — 700101 HCHG RX REV CODE 250: Performed by: ANESTHESIOLOGY

## 2022-04-26 PROCEDURE — 700111 HCHG RX REV CODE 636 W/ 250 OVERRIDE (IP): Performed by: PHYSICIAN ASSISTANT

## 2022-04-26 PROCEDURE — 00670 ANES XTNSV SP&SPI CORD PX: CPT | Performed by: ANESTHESIOLOGY

## 2022-04-26 PROCEDURE — A9270 NON-COVERED ITEM OR SERVICE: HCPCS | Performed by: PHYSICIAN ASSISTANT

## 2022-04-26 PROCEDURE — A9270 NON-COVERED ITEM OR SERVICE: HCPCS | Performed by: NEUROLOGICAL SURGERY

## 2022-04-26 PROCEDURE — 82962 GLUCOSE BLOOD TEST: CPT

## 2022-04-26 PROCEDURE — 110454 HCHG SHELL REV 250: Performed by: NEUROLOGICAL SURGERY

## 2022-04-26 PROCEDURE — 700111 HCHG RX REV CODE 636 W/ 250 OVERRIDE (IP): Performed by: NEUROLOGICAL SURGERY

## 2022-04-26 PROCEDURE — 160041 HCHG SURGERY MINUTES - EA ADDL 1 MIN LEVEL 4: Performed by: NEUROLOGICAL SURGERY

## 2022-04-26 PROCEDURE — 500367 HCHG DRAIN KIT, HEMOVAC: Performed by: NEUROLOGICAL SURGERY

## 2022-04-26 PROCEDURE — A9270 NON-COVERED ITEM OR SERVICE: HCPCS | Performed by: ANESTHESIOLOGY

## 2022-04-26 PROCEDURE — 160036 HCHG PACU - EA ADDL 30 MINS PHASE I: Performed by: NEUROLOGICAL SURGERY

## 2022-04-26 PROCEDURE — 99204 OFFICE O/P NEW MOD 45 MIN: CPT | Performed by: INTERNAL MEDICINE

## 2022-04-26 PROCEDURE — 72020 X-RAY EXAM OF SPINE 1 VIEW: CPT

## 2022-04-26 PROCEDURE — RXMED WILLOW AMBULATORY MEDICATION CHARGE: Performed by: PHYSICIAN ASSISTANT

## 2022-04-26 PROCEDURE — 36415 COLL VENOUS BLD VENIPUNCTURE: CPT

## 2022-04-26 PROCEDURE — 93005 ELECTROCARDIOGRAM TRACING: CPT | Performed by: INTERNAL MEDICINE

## 2022-04-26 PROCEDURE — 501838 HCHG SUTURE GENERAL: Performed by: NEUROLOGICAL SURGERY

## 2022-04-26 PROCEDURE — 700102 HCHG RX REV CODE 250 W/ 637 OVERRIDE(OP): Performed by: PHYSICIAN ASSISTANT

## 2022-04-26 PROCEDURE — 700102 HCHG RX REV CODE 250 W/ 637 OVERRIDE(OP): Performed by: NEUROLOGICAL SURGERY

## 2022-04-26 PROCEDURE — 71275 CT ANGIOGRAPHY CHEST: CPT

## 2022-04-26 PROCEDURE — 71045 X-RAY EXAM CHEST 1 VIEW: CPT

## 2022-04-26 RX ORDER — CALCIUM CARBONATE 500 MG/1
500 TABLET, CHEWABLE ORAL 2 TIMES DAILY
Status: DISCONTINUED | OUTPATIENT
Start: 2022-04-26 | End: 2022-04-29 | Stop reason: HOSPADM

## 2022-04-26 RX ORDER — OMEPRAZOLE 20 MG/1
20 CAPSULE, DELAYED RELEASE ORAL DAILY
Status: DISCONTINUED | OUTPATIENT
Start: 2022-04-27 | End: 2022-04-29 | Stop reason: HOSPADM

## 2022-04-26 RX ORDER — DOCUSATE SODIUM 100 MG/1
100 CAPSULE, LIQUID FILLED ORAL 2 TIMES DAILY
Status: DISCONTINUED | OUTPATIENT
Start: 2022-04-26 | End: 2022-04-29 | Stop reason: HOSPADM

## 2022-04-26 RX ORDER — ONDANSETRON 4 MG/1
4 TABLET, ORALLY DISINTEGRATING ORAL EVERY 4 HOURS PRN
Status: DISCONTINUED | OUTPATIENT
Start: 2022-04-26 | End: 2022-04-29 | Stop reason: HOSPADM

## 2022-04-26 RX ORDER — ONDANSETRON 2 MG/ML
4 INJECTION INTRAMUSCULAR; INTRAVENOUS ONCE
Status: COMPLETED | OUTPATIENT
Start: 2022-04-26 | End: 2022-04-26

## 2022-04-26 RX ORDER — TIZANIDINE 4 MG/1
4 TABLET ORAL 3 TIMES DAILY
Qty: 42 TABLET | Refills: 0
Start: 2022-04-26 | End: 2022-05-10

## 2022-04-26 RX ORDER — MORPHINE SULFATE 0.5 MG/ML
INJECTION, SOLUTION EPIDURAL; INTRATHECAL; INTRAVENOUS PRN
Status: DISCONTINUED | OUTPATIENT
Start: 2022-04-26 | End: 2022-04-26 | Stop reason: SURG

## 2022-04-26 RX ORDER — HYDROMORPHONE HYDROCHLORIDE 1 MG/ML
0.5 INJECTION, SOLUTION INTRAMUSCULAR; INTRAVENOUS; SUBCUTANEOUS
Status: DISCONTINUED | OUTPATIENT
Start: 2022-04-26 | End: 2022-04-29 | Stop reason: HOSPADM

## 2022-04-26 RX ORDER — SODIUM CHLORIDE, SODIUM LACTATE, POTASSIUM CHLORIDE, CALCIUM CHLORIDE 600; 310; 30; 20 MG/100ML; MG/100ML; MG/100ML; MG/100ML
INJECTION, SOLUTION INTRAVENOUS CONTINUOUS
Status: ACTIVE | OUTPATIENT
Start: 2022-04-26 | End: 2022-04-26

## 2022-04-26 RX ORDER — CEFAZOLIN SODIUM 1 G/3ML
INJECTION, POWDER, FOR SOLUTION INTRAMUSCULAR; INTRAVENOUS
Status: DISCONTINUED | OUTPATIENT
Start: 2022-04-26 | End: 2022-04-26 | Stop reason: HOSPADM

## 2022-04-26 RX ORDER — KETOROLAC TROMETHAMINE 30 MG/ML
INJECTION, SOLUTION INTRAMUSCULAR; INTRAVENOUS PRN
Status: DISCONTINUED | OUTPATIENT
Start: 2022-04-26 | End: 2022-04-26 | Stop reason: SURG

## 2022-04-26 RX ORDER — ONDANSETRON 2 MG/ML
INJECTION INTRAMUSCULAR; INTRAVENOUS PRN
Status: DISCONTINUED | OUTPATIENT
Start: 2022-04-26 | End: 2022-04-26 | Stop reason: SURG

## 2022-04-26 RX ORDER — DEXTROSE MONOHYDRATE, SODIUM CHLORIDE, AND POTASSIUM CHLORIDE 50; 1.49; 9 G/1000ML; G/1000ML; G/1000ML
INJECTION, SOLUTION INTRAVENOUS CONTINUOUS
Status: DISCONTINUED | OUTPATIENT
Start: 2022-04-26 | End: 2022-04-28

## 2022-04-26 RX ORDER — OXYCODONE HYDROCHLORIDE 10 MG/1
10 TABLET ORAL
Status: DISCONTINUED | OUTPATIENT
Start: 2022-04-26 | End: 2022-04-29 | Stop reason: HOSPADM

## 2022-04-26 RX ORDER — OXYCODONE HYDROCHLORIDE AND ACETAMINOPHEN 5; 325 MG/1; MG/1
2 TABLET ORAL
Status: COMPLETED | OUTPATIENT
Start: 2022-04-26 | End: 2022-04-26

## 2022-04-26 RX ORDER — POLYETHYLENE GLYCOL 3350 17 G/17G
1 POWDER, FOR SOLUTION ORAL 2 TIMES DAILY PRN
Status: DISCONTINUED | OUTPATIENT
Start: 2022-04-26 | End: 2022-04-29 | Stop reason: HOSPADM

## 2022-04-26 RX ORDER — MIDAZOLAM HYDROCHLORIDE 1 MG/ML
INJECTION INTRAMUSCULAR; INTRAVENOUS PRN
Status: DISCONTINUED | OUTPATIENT
Start: 2022-04-26 | End: 2022-04-26 | Stop reason: SURG

## 2022-04-26 RX ORDER — ROCURONIUM BROMIDE 10 MG/ML
INJECTION, SOLUTION INTRAVENOUS PRN
Status: DISCONTINUED | OUTPATIENT
Start: 2022-04-26 | End: 2022-04-26 | Stop reason: SURG

## 2022-04-26 RX ORDER — BISACODYL 10 MG
10 SUPPOSITORY, RECTAL RECTAL
Status: DISCONTINUED | OUTPATIENT
Start: 2022-04-26 | End: 2022-04-29 | Stop reason: HOSPADM

## 2022-04-26 RX ORDER — HALOPERIDOL 5 MG/ML
1 INJECTION INTRAMUSCULAR
Status: DISCONTINUED | OUTPATIENT
Start: 2022-04-26 | End: 2022-04-26 | Stop reason: HOSPADM

## 2022-04-26 RX ORDER — HYDRALAZINE HYDROCHLORIDE 20 MG/ML
5 INJECTION INTRAMUSCULAR; INTRAVENOUS
Status: DISCONTINUED | OUTPATIENT
Start: 2022-04-26 | End: 2022-04-26 | Stop reason: HOSPADM

## 2022-04-26 RX ORDER — DIPHENHYDRAMINE HYDROCHLORIDE 50 MG/ML
12.5 INJECTION INTRAMUSCULAR; INTRAVENOUS
Status: DISCONTINUED | OUTPATIENT
Start: 2022-04-26 | End: 2022-04-26 | Stop reason: HOSPADM

## 2022-04-26 RX ORDER — MORPHINE SULFATE 10 MG/ML
5 INJECTION, SOLUTION INTRAMUSCULAR; INTRAVENOUS
Status: DISCONTINUED | OUTPATIENT
Start: 2022-04-26 | End: 2022-04-26 | Stop reason: HOSPADM

## 2022-04-26 RX ORDER — LABETALOL HYDROCHLORIDE 5 MG/ML
10 INJECTION, SOLUTION INTRAVENOUS
Status: DISCONTINUED | OUTPATIENT
Start: 2022-04-26 | End: 2022-04-29 | Stop reason: HOSPADM

## 2022-04-26 RX ORDER — MORPHINE SULFATE 4 MG/ML
1 INJECTION INTRAVENOUS
Status: DISCONTINUED | OUTPATIENT
Start: 2022-04-26 | End: 2022-04-26 | Stop reason: HOSPADM

## 2022-04-26 RX ORDER — ATORVASTATIN CALCIUM 20 MG/1
20 TABLET, FILM COATED ORAL EVERY EVENING
Status: DISCONTINUED | OUTPATIENT
Start: 2022-04-27 | End: 2022-04-29 | Stop reason: HOSPADM

## 2022-04-26 RX ORDER — METHOCARBAMOL 750 MG/1
750 TABLET, FILM COATED ORAL EVERY 8 HOURS PRN
Status: DISCONTINUED | OUTPATIENT
Start: 2022-04-26 | End: 2022-04-29 | Stop reason: HOSPADM

## 2022-04-26 RX ORDER — DIPHENHYDRAMINE HCL 25 MG
25 TABLET ORAL EVERY 6 HOURS PRN
Status: DISCONTINUED | OUTPATIENT
Start: 2022-04-26 | End: 2022-04-29 | Stop reason: HOSPADM

## 2022-04-26 RX ORDER — ACETAMINOPHEN 500 MG
1000 TABLET ORAL EVERY 6 HOURS
Status: DISCONTINUED | OUTPATIENT
Start: 2022-04-26 | End: 2022-04-29 | Stop reason: HOSPADM

## 2022-04-26 RX ORDER — OXYCODONE HYDROCHLORIDE 5 MG/1
5 TABLET ORAL
Status: DISCONTINUED | OUTPATIENT
Start: 2022-04-26 | End: 2022-04-29 | Stop reason: HOSPADM

## 2022-04-26 RX ORDER — CALCIUM CARBONATE 500 MG/1
500 TABLET, CHEWABLE ORAL 2 TIMES DAILY
Status: DISCONTINUED | OUTPATIENT
Start: 2022-04-26 | End: 2022-04-26

## 2022-04-26 RX ORDER — MORPHINE SULFATE 4 MG/ML
2 INJECTION INTRAVENOUS
Status: DISCONTINUED | OUTPATIENT
Start: 2022-04-26 | End: 2022-04-26 | Stop reason: HOSPADM

## 2022-04-26 RX ORDER — CYCLOBENZAPRINE HCL 10 MG
10 TABLET ORAL EVERY 8 HOURS PRN
Status: DISCONTINUED | OUTPATIENT
Start: 2022-04-26 | End: 2022-04-29 | Stop reason: HOSPADM

## 2022-04-26 RX ORDER — OXYCODONE HYDROCHLORIDE AND ACETAMINOPHEN 5; 325 MG/1; MG/1
1 TABLET ORAL
Status: COMPLETED | OUTPATIENT
Start: 2022-04-26 | End: 2022-04-26

## 2022-04-26 RX ORDER — LABETALOL HYDROCHLORIDE 5 MG/ML
5 INJECTION, SOLUTION INTRAVENOUS
Status: DISCONTINUED | OUTPATIENT
Start: 2022-04-26 | End: 2022-04-26 | Stop reason: HOSPADM

## 2022-04-26 RX ORDER — GABAPENTIN 300 MG/1
300 CAPSULE ORAL 3 TIMES DAILY
Status: DISCONTINUED | OUTPATIENT
Start: 2022-04-26 | End: 2022-04-29 | Stop reason: HOSPADM

## 2022-04-26 RX ORDER — DIAZEPAM 5 MG/1
5 TABLET ORAL EVERY 4 HOURS PRN
Status: DISCONTINUED | OUTPATIENT
Start: 2022-04-26 | End: 2022-04-29 | Stop reason: HOSPADM

## 2022-04-26 RX ORDER — METOPROLOL SUCCINATE 25 MG/1
25 TABLET, EXTENDED RELEASE ORAL
Status: DISCONTINUED | OUTPATIENT
Start: 2022-04-27 | End: 2022-04-29 | Stop reason: HOSPADM

## 2022-04-26 RX ORDER — ACETAMINOPHEN 500 MG
1000 TABLET ORAL EVERY 6 HOURS PRN
Status: DISCONTINUED | OUTPATIENT
Start: 2022-05-01 | End: 2022-04-29 | Stop reason: HOSPADM

## 2022-04-26 RX ORDER — IPRATROPIUM BROMIDE AND ALBUTEROL SULFATE 2.5; .5 MG/3ML; MG/3ML
3 SOLUTION RESPIRATORY (INHALATION)
Status: DISCONTINUED | OUTPATIENT
Start: 2022-04-26 | End: 2022-04-26 | Stop reason: HOSPADM

## 2022-04-26 RX ORDER — CEFAZOLIN SODIUM 2 G/100ML
2 INJECTION, SOLUTION INTRAVENOUS EVERY 8 HOURS
Status: COMPLETED | OUTPATIENT
Start: 2022-04-26 | End: 2022-04-27

## 2022-04-26 RX ORDER — MEPERIDINE HYDROCHLORIDE 25 MG/ML
12.5 INJECTION INTRAMUSCULAR; INTRAVENOUS; SUBCUTANEOUS
Status: DISCONTINUED | OUTPATIENT
Start: 2022-04-26 | End: 2022-04-26 | Stop reason: HOSPADM

## 2022-04-26 RX ORDER — LIDOCAINE HYDROCHLORIDE 20 MG/ML
INJECTION, SOLUTION EPIDURAL; INFILTRATION; INTRACAUDAL; PERINEURAL PRN
Status: DISCONTINUED | OUTPATIENT
Start: 2022-04-26 | End: 2022-04-26 | Stop reason: SURG

## 2022-04-26 RX ORDER — DEXAMETHASONE SODIUM PHOSPHATE 4 MG/ML
INJECTION, SOLUTION INTRA-ARTICULAR; INTRALESIONAL; INTRAMUSCULAR; INTRAVENOUS; SOFT TISSUE PRN
Status: DISCONTINUED | OUTPATIENT
Start: 2022-04-26 | End: 2022-04-26 | Stop reason: SURG

## 2022-04-26 RX ORDER — AMOXICILLIN 250 MG
1 CAPSULE ORAL
Status: DISCONTINUED | OUTPATIENT
Start: 2022-04-26 | End: 2022-04-29 | Stop reason: HOSPADM

## 2022-04-26 RX ORDER — BUPIVACAINE HYDROCHLORIDE AND EPINEPHRINE 5; 5 MG/ML; UG/ML
INJECTION, SOLUTION EPIDURAL; INTRACAUDAL; PERINEURAL
Status: DISCONTINUED | OUTPATIENT
Start: 2022-04-26 | End: 2022-04-26 | Stop reason: HOSPADM

## 2022-04-26 RX ORDER — DIPHENHYDRAMINE HYDROCHLORIDE 50 MG/ML
25 INJECTION INTRAMUSCULAR; INTRAVENOUS EVERY 6 HOURS PRN
Status: DISCONTINUED | OUTPATIENT
Start: 2022-04-26 | End: 2022-04-29 | Stop reason: HOSPADM

## 2022-04-26 RX ORDER — ONDANSETRON 2 MG/ML
4 INJECTION INTRAMUSCULAR; INTRAVENOUS EVERY 4 HOURS PRN
Status: DISCONTINUED | OUTPATIENT
Start: 2022-04-26 | End: 2022-04-29 | Stop reason: HOSPADM

## 2022-04-26 RX ORDER — ENEMA 19; 7 G/133ML; G/133ML
1 ENEMA RECTAL
Status: DISCONTINUED | OUTPATIENT
Start: 2022-04-26 | End: 2022-04-29 | Stop reason: HOSPADM

## 2022-04-26 RX ORDER — AMOXICILLIN 250 MG
1 CAPSULE ORAL NIGHTLY
Status: DISCONTINUED | OUTPATIENT
Start: 2022-04-26 | End: 2022-04-29 | Stop reason: HOSPADM

## 2022-04-26 RX ORDER — CEFAZOLIN SODIUM 1 G/3ML
INJECTION, POWDER, FOR SOLUTION INTRAMUSCULAR; INTRAVENOUS PRN
Status: DISCONTINUED | OUTPATIENT
Start: 2022-04-26 | End: 2022-04-26 | Stop reason: SURG

## 2022-04-26 RX ORDER — OXYCODONE HYDROCHLORIDE AND ACETAMINOPHEN 5; 325 MG/1; MG/1
1 TABLET ORAL EVERY 4 HOURS PRN
Qty: 15 TABLET | Refills: 0
Start: 2022-04-26 | End: 2022-05-03

## 2022-04-26 RX ADMIN — PROPOFOL 180 MG: 10 INJECTION, EMULSION INTRAVENOUS at 11:24

## 2022-04-26 RX ADMIN — MORPHINE SULFATE 5 MG: 0.5 INJECTION, SOLUTION EPIDURAL; INTRATHECAL; INTRAVENOUS at 11:48

## 2022-04-26 RX ADMIN — MORPHINE SULFATE 5 MG: 0.5 INJECTION, SOLUTION EPIDURAL; INTRATHECAL; INTRAVENOUS at 12:36

## 2022-04-26 RX ADMIN — DOCUSATE SODIUM 100 MG: 100 CAPSULE, LIQUID FILLED ORAL at 17:37

## 2022-04-26 RX ADMIN — FENTANYL CITRATE 50 MCG: 50 INJECTION, SOLUTION INTRAMUSCULAR; INTRAVENOUS at 13:58

## 2022-04-26 RX ADMIN — FENTANYL CITRATE 50 MCG: 50 INJECTION, SOLUTION INTRAMUSCULAR; INTRAVENOUS at 13:37

## 2022-04-26 RX ADMIN — GABAPENTIN 300 MG: 300 CAPSULE ORAL at 17:37

## 2022-04-26 RX ADMIN — KETOROLAC TROMETHAMINE 30 MG: 30 INJECTION, SOLUTION INTRAMUSCULAR at 12:32

## 2022-04-26 RX ADMIN — CEFAZOLIN 2 G: 330 INJECTION, POWDER, FOR SOLUTION INTRAMUSCULAR; INTRAVENOUS at 11:34

## 2022-04-26 RX ADMIN — OXYCODONE HYDROCHLORIDE AND ACETAMINOPHEN 2 TABLET: 5; 325 TABLET ORAL at 13:37

## 2022-04-26 RX ADMIN — FENTANYL CITRATE 100 MCG: 50 INJECTION, SOLUTION INTRAMUSCULAR; INTRAVENOUS at 11:21

## 2022-04-26 RX ADMIN — POTASSIUM CHLORIDE, DEXTROSE MONOHYDRATE AND SODIUM CHLORIDE: 150; 5; 900 INJECTION, SOLUTION INTRAVENOUS at 17:41

## 2022-04-26 RX ADMIN — ONDANSETRON 4 MG: 2 INJECTION INTRAMUSCULAR; INTRAVENOUS at 13:37

## 2022-04-26 RX ADMIN — GABAPENTIN 300 MG: 300 CAPSULE ORAL at 23:43

## 2022-04-26 RX ADMIN — METHOCARBAMOL 750 MG: 750 TABLET ORAL at 13:38

## 2022-04-26 RX ADMIN — ONDANSETRON 4 MG: 2 INJECTION INTRAMUSCULAR; INTRAVENOUS at 12:11

## 2022-04-26 RX ADMIN — CEFAZOLIN SODIUM 2 G: 2 INJECTION, SOLUTION INTRAVENOUS at 21:12

## 2022-04-26 RX ADMIN — IOHEXOL 52 ML: 350 INJECTION, SOLUTION INTRAVENOUS at 22:10

## 2022-04-26 RX ADMIN — CALCIUM CARBONATE 500 MG: 500 TABLET, CHEWABLE ORAL at 16:24

## 2022-04-26 RX ADMIN — ONDANSETRON 4 MG: 2 INJECTION INTRAMUSCULAR; INTRAVENOUS at 16:22

## 2022-04-26 RX ADMIN — ACETAMINOPHEN 1000 MG: 500 TABLET ORAL at 17:37

## 2022-04-26 RX ADMIN — HALOPERIDOL LACTATE 1 MG: 5 INJECTION, SOLUTION INTRAMUSCULAR at 14:18

## 2022-04-26 RX ADMIN — MIDAZOLAM HYDROCHLORIDE 2 MG: 1 INJECTION, SOLUTION INTRAMUSCULAR; INTRAVENOUS at 11:20

## 2022-04-26 RX ADMIN — DEXAMETHASONE SODIUM PHOSPHATE 8 MG: 4 INJECTION, SOLUTION INTRA-ARTICULAR; INTRALESIONAL; INTRAMUSCULAR; INTRAVENOUS; SOFT TISSUE at 12:11

## 2022-04-26 RX ADMIN — ROCURONIUM BROMIDE 50 MG: 10 INJECTION, SOLUTION INTRAVENOUS at 11:24

## 2022-04-26 RX ADMIN — LIDOCAINE HYDROCHLORIDE 100 MG: 20 INJECTION, SOLUTION EPIDURAL; INFILTRATION; INTRACAUDAL at 11:24

## 2022-04-26 RX ADMIN — ACETAMINOPHEN 1000 MG: 500 TABLET ORAL at 23:43

## 2022-04-26 RX ADMIN — SODIUM CHLORIDE, POTASSIUM CHLORIDE, SODIUM LACTATE AND CALCIUM CHLORIDE: 600; 310; 30; 20 INJECTION, SOLUTION INTRAVENOUS at 09:15

## 2022-04-26 RX ADMIN — ONDANSETRON 4 MG: 2 INJECTION INTRAMUSCULAR; INTRAVENOUS at 20:23

## 2022-04-26 RX ADMIN — SENNOSIDES AND DOCUSATE SODIUM 1 TABLET: 50; 8.6 TABLET ORAL at 21:12

## 2022-04-26 ASSESSMENT — PAIN DESCRIPTION - PAIN TYPE
TYPE: SURGICAL PAIN
TYPE: SURGICAL PAIN
TYPE: ACUTE PAIN;SURGICAL PAIN
TYPE: SURGICAL PAIN
TYPE: SURGICAL PAIN
TYPE: ACUTE PAIN;SURGICAL PAIN
TYPE: SURGICAL PAIN
TYPE: SURGICAL PAIN
TYPE: ACUTE PAIN;SURGICAL PAIN

## 2022-04-26 ASSESSMENT — ENCOUNTER SYMPTOMS
EYE REDNESS: 0
ABDOMINAL PAIN: 0
VOMITING: 0
SHORTNESS OF BREATH: 1
CLAUDICATION: 0
ORTHOPNEA: 0
SINUS PAIN: 0
SPUTUM PRODUCTION: 0
FEVER: 0
DIARRHEA: 0
EYE DISCHARGE: 0
STRIDOR: 0
WEIGHT LOSS: 0
BACK PAIN: 0
MYALGIAS: 0
WHEEZING: 0
SEIZURES: 0
FOCAL WEAKNESS: 0
COUGH: 0
DIZZINESS: 0
NAUSEA: 0
HEARTBURN: 0
CHILLS: 0
NECK PAIN: 0
HEADACHES: 0
BLOOD IN STOOL: 0
DEPRESSION: 0
CONSTIPATION: 0
EYE PAIN: 0
NERVOUS/ANXIOUS: 0
PALPITATIONS: 0
BLURRED VISION: 0
SORE THROAT: 0
INSOMNIA: 0

## 2022-04-26 ASSESSMENT — COGNITIVE AND FUNCTIONAL STATUS - GENERAL
DRESSING REGULAR LOWER BODY CLOTHING: A LITTLE
CLIMB 3 TO 5 STEPS WITH RAILING: A LITTLE
TOILETING: A LITTLE
MOVING TO AND FROM BED TO CHAIR: A LITTLE
STANDING UP FROM CHAIR USING ARMS: A LITTLE
DRESSING REGULAR UPPER BODY CLOTHING: A LITTLE
MOBILITY SCORE: 19
WALKING IN HOSPITAL ROOM: A LITTLE
SUGGESTED CMS G CODE MODIFIER MOBILITY: CK
SUGGESTED CMS G CODE MODIFIER DAILY ACTIVITY: CJ
MOVING FROM LYING ON BACK TO SITTING ON SIDE OF FLAT BED: A LITTLE
DAILY ACTIVITIY SCORE: 21

## 2022-04-26 ASSESSMENT — COPD QUESTIONNAIRES
COPD SCREENING SCORE: 2
DO YOU EVER COUGH UP ANY MUCUS OR PHLEGM?: NO/ONLY WITH OCCASIONAL COLDS OR INFECTIONS
DURING THE PAST 4 WEEKS HOW MUCH DID YOU FEEL SHORT OF BREATH: NONE/LITTLE OF THE TIME
HAVE YOU SMOKED AT LEAST 100 CIGARETTES IN YOUR ENTIRE LIFE: NO/DON'T KNOW

## 2022-04-26 ASSESSMENT — PAIN SCALES - GENERAL: PAIN_LEVEL: 0

## 2022-04-26 NOTE — ANESTHESIA PREPROCEDURE EVALUATION
Case: [id number] Date/Time: 04/26/22 1015    Procedures:       LAMINECTOMY, SPINE, LUMBAR, WITHL DISCECTOMY - L5 WITH L4-S1 MEDIAL FACETECTOMY      FORAMINOTOMY, SPINE    Pre-op diagnosis: LUMBAR SPONDYLOSIS    Location: [facility] OR 05 / SURGERY [facility]    Surgeons: Duane Weiss M.D.          Relevant Problems   CARDIAC   (positive) Hypertension   (positive) Venous insufficiency      GI   (positive) Gastroesophageal reflux disease without esophagitis       Physical Exam    Airway   Mallampati: III  TM distance: >3 FB  Neck ROM: full       Cardiovascular - normal exam  Rhythm: regular  Rate: normal  (-) murmur     Dental - normal exam           Pulmonary - normal exam  Breath sounds clear to auscultation     Abdominal   (+) obese     Neurological - normal exam                 Anesthesia Plan    ASA 2       Plan - general       Airway plan will be ETT          Induction: intravenous    Postoperative Plan: Postoperative administration of opioids is intended.    Pertinent diagnostic labs and testing reviewed    Informed Consent:    Anesthetic plan and risks discussed with patient.    Use of blood products discussed with: patient whom consented to blood products.

## 2022-04-26 NOTE — PROGRESS NOTES
Med rec complete per pt and family interpreting at bedside  Interviewed pt with family at bedside with permission from pt  Allergies reviewed and updated.

## 2022-04-26 NOTE — ANESTHESIA POSTPROCEDURE EVALUATION
Patient: Nila Villatoro    Procedure Summary     Date: 04/26/22 Room / Location: [facility] OR 05 / SURGERY [facility]    Anesthesia Start: 1118 Anesthesia Stop: 1257    Procedures:       LAMINECTOMY, SPINE, LUMBAR, WITHL DISCECTOMY - L5 WITH L4-S1 MEDIAL FACETECTOMY (Spine Lumbar)      FORAMINOTOMY, SPINE (Spine Lumbar) Diagnosis: (LUMBAR SPONDYLOSIS)    Surgeons: Duane Weiss M.D. Responsible Provider: Jorge Frederick M.D.    Anesthesia Type: general ASA Status: 2          Final Anesthesia Type: general  Last vitals  BP   Blood Pressure : (!) 179/93    Temp   36.6 °C (97.9 °F)    Pulse   66   Resp   15    SpO2   98 %      Anesthesia Post Evaluation    Patient location during evaluation: PACU  Patient participation: complete - patient participated  Level of consciousness: awake and alert  Pain score: 0    Airway patency: patent  Anesthetic complications: no  Cardiovascular status: hemodynamically stable  Respiratory status: acceptable  Hydration status: euvolemic    PONV: none          There were no known complications for this encounter.     Nurse Pain Score: 5 (NPRS)

## 2022-04-26 NOTE — ANESTHESIA PROCEDURE NOTES
Airway    Date/Time: 4/26/2022 11:26 AM  Performed by: Jorge Frederick M.D.  Authorized by: Jorge Frederick M.D.     Location:  OR  Urgency:  Elective  Difficult Airway: No    Indications for Airway Management:  Anesthesia      Spontaneous Ventilation: absent    Sedation Level:  Deep  Preoxygenated: Yes    Patient Position:  Sniffing  Mask Difficulty Assessment:  1 - vent by mask  Final Airway Type:  Endotracheal airway  Final Endotracheal Airway:  ETT  Cuffed: Yes    Technique Used for Successful ETT Placement:  Direct laryngoscopy    Insertion Site:  Oral  Blade Type:  Arreguin  Laryngoscope Blade/Videolaryngoscope Blade Size:  2  ETT Size (mm):  7.0  Measured from:  Teeth  ETT to Teeth (cm):  22  Placement Verified by: auscultation and capnometry    Cormack-Lehane Classification:  Grade I - full view of glottis  Number of Attempts at Approach:  1

## 2022-04-26 NOTE — OR NURSING
Pre-op assessment complete, VSS stable, pt and family updated on POC. Call light within reach. Denies needs at this time.    Pre-Op completed with daughter, Nesha, as . Refusal of  form signed by pt.

## 2022-04-26 NOTE — OR NURSING
Pt in recovery, medicated for 8/10 pain, pt states relief. Attempt to call pt daughter Nesha, no answer at this time. Dressing CDI. Pt able to move all extremities, states small amount of tingling in hands and feet.

## 2022-04-26 NOTE — DISCHARGE SUMMARY
Discharge Summary    CHIEF COMPLAINT ON ADMISSION  No chief complaint on file.      Reason for Admission  LUMBAR SPONDYLOSIS     Admission Date  4/26/2022    CODE STATUS  No Order    HPI & HOSPITAL COURSE  This is a 66 y.o. female here with lumbar radiculopathy and neurogenic claudication. Pt presented for elective spine surgery, which proceeded without complication.   Postoperatively she had an episode of hypoxia and chest pain, rapid response was called and her initial labs were WNL, EKG demonstrated PACs NSR. Hospitalist team was consulted for additional medical mgmt.   She was kept overnight for pain control, drain output monitoring,she will be discharged home when meeting criteria & cleared from a medical standpoint.    No notes on file    Therefore, she is discharged in good and stable condition to home with close outpatient follow-up.    The patient recovered much more quickly than anticipated on admission.    Discharge Date   4/27/22 if meeting criteria    FOLLOW UP ITEMS POST DISCHARGE  Keep scheduled apt at [facility] neurosurgery    DISCHARGE DIAGNOSES  Active Problems:    * No active hospital problems. *  Resolved Problems:    * No resolved hospital problems. *      FOLLOW UP  No future appointments.  No follow-up provider specified.    MEDICATIONS ON DISCHARGE     Medication List      START taking these medications      Instructions   oxyCODONE-acetaminophen 5-325 MG Tabs  Commonly known as: PERCOCET   Take 1 Tablet by mouth every four hours as needed for Moderate Pain for up to 7 days.  Dose: 1 Tablet     tizanidine 4 MG Tabs  Commonly known as: ZANAFLEX   Take 1 Tablet by mouth 3 times a day for 14 days.  Dose: 4 mg        CHANGE how you take these medications      Instructions   atorvastatin 20 MG Tabs  What changed: See the new instructions.  Commonly known as: LIPITOR   Doctor's comments: PT needs appt for more refills  TOME BRANDY TABLETA TODOS LOS WHITFIELD EN LA NOCHE     metoprolol SR 25 MG Tb24  What  changed: when to take this  Commonly known as: TOPROL XL   TOME BRANDY TABLETA TODOS LOS WHITFIEDL        CONTINUE taking these medications      Instructions   gabapentin 300 MG Caps  Commonly known as: NEURONTIN   Take 300 mg by mouth 3 times a day.  Dose: 300 mg         Rx for percocet 5/325mg & tizanidine escribed [facility] pharmacy meds to beds   reviewed. Low risk per ORT, informed consent obtained    Allergies  Allergies   Allergen Reactions   • Penicillins Hives       DIET  No orders of the defined types were placed in this encounter.      ACTIVITY  Light duty.  Weight bearing as tolerated    CONSULTATIONS       PROCEDURES   L5 laminectomy, bilateral L4-S1 foraminotomy and medial facetectomy  Dr Duane Weiss, 4/26/22    LABORATORY  Lab Results   Component Value Date    SODIUM 140 04/22/2022    POTASSIUM 3.7 04/22/2022    CHLORIDE 105 04/22/2022    CO2 24 04/22/2022    GLUCOSE 104 (H) 04/22/2022    BUN 8 04/22/2022    CREATININE 0.65 04/22/2022    CREATININE 0.70 05/21/2010    CREATININE 0.70 05/21/2010    GLOMRATE >59 05/21/2010    GLOMRATE >59 05/21/2010        Lab Results   Component Value Date    WBC 7.6 04/22/2022    WBC 8.6 05/21/2010    WBC 8.6 05/21/2010    HEMOGLOBIN 13.3 04/22/2022    HEMATOCRIT 40.8 04/22/2022    PLATELETCT 308 04/22/2022        Total time of the discharge process exceeds 30 minutes.

## 2022-04-26 NOTE — OP REPORT
Surgeon Duane Weiss  First assist Jasmin Ni and    Preoperative diagnosis lumbar stenosis with neurogenic claudication  Postoperative diagnoses the same    Procedures  L4, L5 laminectomy  L3-4, L4-5, L5-S1 medial facetectomy and bilateral foraminotomies    Complications none  Drain medium Hemovac    Brief HPI  66-year-old woman who presents to neurosurgery clinic with intractable lower extremity pain radiculopathy down her legs and symptoms neurogenic claudication her imaging demonstrated relatively normal looking canal but had bilateral nerve root impingement at the level of the pedicle at the pedicle of L4-L5 and S1 where the DRG's were more proximally located this led to patient having intractable radicular pain as well as neurogenic claudication she had conservative management which failed and she wished to move forward with decompression.    Consent was obtained from the patient and her daughter present risk complication Acacian surgery which included CSF leak nerve root injury need for more surgery agreed consented.    Procedure in detail the patient was brought in the operating room space and general anesthesia with endotracheal intubation we then placed her in a Kingston table with Pernell frame and then clipped prepped and draped in a sterile usual fashion for lower lumbar surgery we then brought in fluoroscopy to approximate the L5 vertebra obtained a AP so a lateral shot confirming the approximate level we then infiltrated the patient's back for lidocaine epinephrine and made a midline incision approximately 3 inches length and then used Bovie cautery along self-retaining reline retractors to expose the spinous process was spinous process was exposed we then placed a Latasha clamp on the spinous process identifying the L4 spinous process with pedicle.  Once that was exposed we then dissected down inferiorly until we expose the spinous process and pars at L4-L5 and the superior aspect of S1 once that was  completed we then used a Leksell rongeur to remove the spinous process and significant amount of the lamina at L4-L5 and then used a 5 mm round ball bur to chevron the L4 and L5 lamina until it was then enough to bite with a Kerrison at this point we are able to see epidural fat one by one patties were then pressed down to the epidural fat to push it away the dura from the lamina we then used a series of Kerrisons to remove the entirety of the lamina at L4-L5 as well as the medial facets at L3-4 L4-5 L5S1 and then performed foraminotomies at L3-4 L4-5 L5-S1 bilaterally.    Once this was completed we could see the nerve root at L4-L5 and S1 passing smoothly out the foramen bilaterally as well as being able to pass a Whitlock ball-tipped probe out the whole ensuring that there was no jagged edges or bone osteophytes pressing into the nerve we felt that we had completed the decompression we irrigated the wound thoroughly bacitracin irrigation Floseal the posterior lateral gutters checked for hemostasis and then placed a medium Hemovac we then closed the wound in layers using 0 Vicryl's on fascia 2-0 Vicryl's on the dermis and then a Monocryl and Dermabond on the skin.    My physician assistant was necessary she assisted with opening closure explained the case.    I was present for the entire to the case.    Patient was extubated taken to the PACU for recovery

## 2022-04-26 NOTE — ANESTHESIA TIME REPORT
Anesthesia Start and Stop Event Times     Date Time Event    4/26/2022 1053 Ready for Procedure     1118 Anesthesia Start     1257 Anesthesia Stop        Responsible Staff  04/26/22    Name Role Begin End    Jorge Frederick M.D. Anesth 1118 1257        Overtime Reason:  no overtime (within assigned shift)    Comments:

## 2022-04-26 NOTE — PROGRESS NOTES
"1600 Pt verbalizing \"chest discomfort\", \"I feel like my heart is going to stop\". Pt verbalizing SOB. Oxygen saturation dropped to low 70's with 2 L via nasal canula. Pt's oxygen increased to 5L via NC and saturation at >90%. Rapid response initiated. Rapid response RNs at bedside. Pt states \"Heart pressure\" is a 6/10. Dr. Weiss pageching.    Received call from HAILEE Bull and updated her regarding rapid response, VS and patient's condition. No new orders at this time.   1730 New order for STAT CT. Per CT, to call after 1800 for time availability.  "

## 2022-04-27 LAB — EKG IMPRESSION: NORMAL

## 2022-04-27 PROCEDURE — 700102 HCHG RX REV CODE 250 W/ 637 OVERRIDE(OP): Performed by: NEUROLOGICAL SURGERY

## 2022-04-27 PROCEDURE — A9270 NON-COVERED ITEM OR SERVICE: HCPCS | Performed by: NEUROLOGICAL SURGERY

## 2022-04-27 PROCEDURE — 93010 ELECTROCARDIOGRAM REPORT: CPT | Performed by: INTERNAL MEDICINE

## 2022-04-27 PROCEDURE — 97161 PT EVAL LOW COMPLEX 20 MIN: CPT

## 2022-04-27 PROCEDURE — 700102 HCHG RX REV CODE 250 W/ 637 OVERRIDE(OP): Performed by: PHYSICIAN ASSISTANT

## 2022-04-27 PROCEDURE — A9270 NON-COVERED ITEM OR SERVICE: HCPCS | Performed by: INTERNAL MEDICINE

## 2022-04-27 PROCEDURE — 700102 HCHG RX REV CODE 250 W/ 637 OVERRIDE(OP): Performed by: INTERNAL MEDICINE

## 2022-04-27 PROCEDURE — 700111 HCHG RX REV CODE 636 W/ 250 OVERRIDE (IP): Performed by: PHYSICIAN ASSISTANT

## 2022-04-27 PROCEDURE — 97165 OT EVAL LOW COMPLEX 30 MIN: CPT

## 2022-04-27 PROCEDURE — G0378 HOSPITAL OBSERVATION PER HR: HCPCS

## 2022-04-27 PROCEDURE — A9270 NON-COVERED ITEM OR SERVICE: HCPCS | Performed by: PHYSICIAN ASSISTANT

## 2022-04-27 RX ADMIN — ACETAMINOPHEN 1000 MG: 500 TABLET ORAL at 05:20

## 2022-04-27 RX ADMIN — CALCIUM CARBONATE 500 MG: 500 TABLET, CHEWABLE ORAL at 05:20

## 2022-04-27 RX ADMIN — OXYCODONE HYDROCHLORIDE 10 MG: 10 TABLET ORAL at 05:19

## 2022-04-27 RX ADMIN — SENNOSIDES AND DOCUSATE SODIUM 1 TABLET: 50; 8.6 TABLET ORAL at 22:57

## 2022-04-27 RX ADMIN — DOCUSATE SODIUM 100 MG: 100 CAPSULE, LIQUID FILLED ORAL at 05:20

## 2022-04-27 RX ADMIN — OMEPRAZOLE 20 MG: 20 CAPSULE, DELAYED RELEASE ORAL at 05:20

## 2022-04-27 RX ADMIN — GABAPENTIN 300 MG: 300 CAPSULE ORAL at 08:22

## 2022-04-27 RX ADMIN — OXYCODONE 5 MG: 5 TABLET ORAL at 12:48

## 2022-04-27 RX ADMIN — ATORVASTATIN CALCIUM 20 MG: 20 TABLET, FILM COATED ORAL at 18:11

## 2022-04-27 RX ADMIN — GABAPENTIN 300 MG: 300 CAPSULE ORAL at 15:39

## 2022-04-27 RX ADMIN — OXYCODONE HYDROCHLORIDE 10 MG: 10 TABLET ORAL at 22:57

## 2022-04-27 RX ADMIN — METOPROLOL SUCCINATE 25 MG: 25 TABLET, EXTENDED RELEASE ORAL at 05:19

## 2022-04-27 RX ADMIN — OXYCODONE 5 MG: 5 TABLET ORAL at 16:13

## 2022-04-27 RX ADMIN — CEFAZOLIN SODIUM 2 G: 2 INJECTION, SOLUTION INTRAVENOUS at 05:20

## 2022-04-27 RX ADMIN — DOCUSATE SODIUM 100 MG: 100 CAPSULE, LIQUID FILLED ORAL at 16:13

## 2022-04-27 RX ADMIN — CALCIUM CARBONATE 500 MG: 500 TABLET, CHEWABLE ORAL at 16:13

## 2022-04-27 RX ADMIN — ACETAMINOPHEN 1000 MG: 500 TABLET ORAL at 12:47

## 2022-04-27 RX ADMIN — ACETAMINOPHEN 1000 MG: 500 TABLET ORAL at 22:57

## 2022-04-27 RX ADMIN — GABAPENTIN 300 MG: 300 CAPSULE ORAL at 22:58

## 2022-04-27 ASSESSMENT — COGNITIVE AND FUNCTIONAL STATUS - GENERAL
DRESSING REGULAR LOWER BODY CLOTHING: A LOT
MOVING FROM LYING ON BACK TO SITTING ON SIDE OF FLAT BED: A LITTLE
MOBILITY SCORE: 18
MOVING TO AND FROM BED TO CHAIR: A LITTLE
CLIMB 3 TO 5 STEPS WITH RAILING: A LITTLE
DAILY ACTIVITIY SCORE: 20
SUGGESTED CMS G CODE MODIFIER MOBILITY: CK
WALKING IN HOSPITAL ROOM: A LITTLE
TURNING FROM BACK TO SIDE WHILE IN FLAT BAD: A LITTLE
HELP NEEDED FOR BATHING: A LOT
SUGGESTED CMS G CODE MODIFIER DAILY ACTIVITY: CJ
STANDING UP FROM CHAIR USING ARMS: A LITTLE

## 2022-04-27 ASSESSMENT — PAIN DESCRIPTION - PAIN TYPE
TYPE: SURGICAL PAIN

## 2022-04-27 ASSESSMENT — GAIT ASSESSMENTS
GAIT LEVEL OF ASSIST: SUPERVISED
DEVIATION: SHUFFLED GAIT
ASSISTIVE DEVICE: FRONT WHEEL WALKER
DISTANCE (FEET): 50

## 2022-04-27 ASSESSMENT — ACTIVITIES OF DAILY LIVING (ADL): TOILETING: INDEPENDENT

## 2022-04-27 NOTE — PROGRESS NOTES
Bedside report received, assessment completed    A&Ox 4, Pain 4/10. TADEO 5/5. +BS last BM 04/26/22, + flatus, adequate urine output via toilet. Surgical site is dermabond RENZO and CDI, HVAC in place to compression. Ambulation with FWW x1.     Discussed POC with pt-pt verbalized understanding. Call light within reach, bed in lowest position, all needs met at this time.

## 2022-04-27 NOTE — PROGRESS NOTES
Neurosurgery Progress Note    Subjective:  Right leg pain improved, some persistent pain in LLE      Exam:  A&O Polish speaking  TADEO with FS  Incision CDi with dermabond  hvac 50    BP  Min: 115/58  Max: 155/70  Pulse  Av.6  Min: 55  Max: 98  Resp  Av.2  Min: 12  Max: 38  Temp  Av.7 °C (98.1 °F)  Min: 36.1 °C (96.9 °F)  Max: 37 °C (98.6 °F)  SpO2  Av.1 %  Min: 90 %  Max: 99 %    No data recorded                      Intake/Output                       22 07 - 22 0659 22 - 22 0659      Total  Total                 Intake    I.V.  1038  -- 1038  --  -- --    Propofol Volume 18 -- 18 -- -- --    Morphine Volume 20 -- 20 -- -- --    Volume (mL) (lactated ringers infusion) 1000 -- 1000 -- -- --    Total Intake 1038 -- 1038 -- -- --       Output    Emesis  --  100 100  --  -- --    Emesis -- 100 100 -- -- --    Emesis - Number of Times -- 1 x 1 x -- -- --    Drains  --  90 90  --  -- --    Output (mL) (Closed/Suction Drain 1 Inferior Back Hemovac) -- 90 90 -- -- --    Total Output -- 190 190 -- -- --       Net I/O     1038 -190 848 -- -- --            Intake/Output Summary (Last 24 hours) at 2022 0859  Last data filed at 2022 0400  Gross per 24 hour   Intake 1038 ml   Output 190 ml   Net 848 ml            • atorvastatin  20 mg Q EVENING   • gabapentin  300 mg TID   • metoprolol SR  25 mg QDAY   • Pharmacy Consult Request  1 Each PHARMACY TO DOSE   • MD ALERT...DO NOT ADMINISTER NSAIDS or ASPIRIN unless ORDERED By Neurosurgery  1 Each PRN   • docusate sodium  100 mg BID   • senna-docusate  1 Tablet Nightly   • senna-docusate  1 Tablet Q24HRS PRN   • polyethylene glycol/lytes  1 Packet BID PRN   • magnesium hydroxide  30 mL QDAY PRN   • bisacodyl  10 mg Q24HRS PRN   • sodium phosphate  1 Each Once PRN   • dextrose 5 % and 0.9 % NaCl with KCl 20 mEq   Continuous   • acetaminophen  1,000 mg Q6HRS    Followed by   • [START ON  5/1/2022] acetaminophen  1,000 mg Q6HRS PRN   • oxyCODONE immediate-release  5 mg Q3HRS PRN    Or   • oxyCODONE immediate-release  10 mg Q3HRS PRN    Or   • HYDROmorphone  0.5 mg Q3HRS PRN   • diphenhydrAMINE  25 mg Q6HRS PRN    Or   • diphenhydrAMINE  25 mg Q6HRS PRN   • ondansetron  4 mg Q4HRS PRN   • ondansetron  4 mg Q4HRS PRN   • methocarbamol  750 mg Q8HRS PRN    Or   • cyclobenzaprine  10 mg Q8HRS PRN    Or   • diazePAM  5 mg Q4HRS PRN   • labetalol  10 mg Q HOUR PRN   • benzocaine-menthol  1 Lozenge Q2HRS PRN   • Respiratory Therapy Consult   Continuous RT   • calcium carbonate  500 mg BID   • omeprazole  20 mg DAILY       Assessment and Plan:  Hospital day #2   POD #1 L4-5 laminectomy  Prophylactic anticoagulation: no         Start date/time: tbd    Neuro exam stable  hospitalist team consulted yesterday for episode of hypoxia & CP, appreciate medical mgmt & discharge clearance recs when appropriate  PT/OT  DC hvac this afternoon after working with therapy  Rx meds to beds  Potentially home this afternoon at the earliest if cleared by therapy & hosptialist teams

## 2022-04-27 NOTE — CARE PLAN
The patient is Stable - Low risk of patient condition declining or worsening    Shift Goals  Clinical Goals: up to chair for all meals  Patient Goals: rest    Progress made toward(s) clinical / shift goals:    Problem: Fall Risk  Goal: Patient will remain free from falls  Outcome: Progressing  Note: Safety precautions are in place including bed locked and in lowest position, upper bed rails up, bed alarm on, call light within reach, treaded socks on, tray table and personal belongings within reach.        Patient is not progressing towards the following goals:

## 2022-04-27 NOTE — FACE TO FACE
Face to Face Note  -  Durable Medical Equipment    Jasmin Wan P.A.-C. - NPI: [id number]  I certify that this patient is under my care and that they have had a durable medical equipment(DME)face to face encounter by myself that meets the physician DME face-to-face encounter requirements with this patient on:    Date of encounter:   Patient:                    MRN:                       YOB: 2021  Nila Villatoro  [id number]  1955     The encounter with the patient was in whole, or in part, for the following medical condition, which is the primary reason for durable medical equipment:  Post-Op Surgery    I certify that, based on my findings, the following durable medical equipment is medically necessary:  Walkers.    HOME O2 Saturation Measurements:(Values must be present for Home Oxygen orders)         ,     ,         My Clinical findings support the need for the above equipment due to:  Abnormal Gait    Supporting Symptoms:       ------------------------------------------------------------------------------------------------------------------    Face to Face Supporting Documentation - Home Health    The encounter with this patient was in whole or in part the primary reason for home health admission.    Date of encounter:   Patient:                    MRN:                       YOB: 2021  Nila Villatoro  [id number]  1955     Home health to see patient for:  Physical Therapy evaluation and treatment   OT    Skilled need for:  Surgical Aftercare      Skilled nursing interventions to include:  Comment:      Homebound evidenced status by:  Need the aid of supportive devices such as crutches, canes, wheelchairs or walkers. Leaving home must require a considerable and taxing effort. There must exist a normal inability to leave the home.    Community Physician to provide follow up care: John Lockett M.D.     Optional  Interventions    Wound information & treatment:    Home Infusion Therapy orders:    Line/Drain/Airway:    I certify the face to face encounter for this home care referral meets the CMS requirements and the encounter/clinical assessment with the patient was, in whole, or in part, for the medical condition(s) listed above, which is the primary reason for home health care. Based on my clinical findings: the service(s) are medically necessary, support the need for home health care, and the homebound criteria are met.  I certify that this patient has had a face to face encounter by myself.  Jasmin Wan P.A.-C. - NPI: [id number]    *Debility, frailty and advanced age in the absence of an acute deterioration or exacerbation of a condition do not qualify a patient for home health.

## 2022-04-27 NOTE — THERAPY
"Occupational Therapy   Initial Evaluation     Patient Name: Nila Villatoro  Age:  66 y.o., Sex:  female  Medical Record #: [id number]  Today's Date: 4/27/2022     Precautions  Precautions: Spinal / Back Precautions   Comments: no brace    Assessment  Patient is 66 y.o. female s/p L3-S1 medial facetectomy and bilateral foraminotomies.  Additional factors influencing patient status / progress: Patient had chest pain while in house, rapid response called. PE ruled out. Patient doing better today. She completed all functional mobility at supv level, initial c/o dizziness upon first getting up. She required assist with LB ADLs. She will be home primarily alone, as her  works 8-8 5 days/wk. Will follow in house, recommend home health on DC.      Language line utilized,  #[id number]    Plan    Recommend Occupational Therapy 3 times per week for 2 visits for the following treatments:  Adaptive Equipment, Cognitive Skill Development, Manual Therapy Techniques, Neuro Re-Education / Balance, Self Care/Activities of Daily Living, Therapeutic Activities, and Therapeutic Exercises.       Discharge Recommendations: Recommend home health for continued occupational therapy services     Subjective    \"I'm a little dizzy, it's already passing.\"     Objective       04/27/22 1159   Prior Living Situation   Prior Services None   Housing / Facility 1 Story House   Steps Into Home 3   Steps In Home 0   Bathroom Set up Bathtub / Shower Combination   Equipment Owned None   Lives with - Patient's Self Care Capacity Spouse   Comments Patient's spouse works 8am-8PM. Her daughter lives in town but also works   Prior Level of ADL Function   Comments Ind prior   Prior Level of IADL Function   Medication Management Independent   Laundry Independent   Kitchen Mobility Independent   Finances Independent   Home Management Independent   Shopping Independent   Prior Level Of Mobility Independent Without Device in " Community;Independent Without Device in Home   Driving / Transportation Driving Independent   Precautions   Precautions Spinal / Back Precautions    Pain 0 - 10 Group   Therapist Pain Assessment During Activity;Nurse Notified   Cognition    Cognition / Consciousness WDL   Level of Consciousness Alert   Comments Pleasant and cooperative   Balance Assessment   Sitting Balance (Static) Fair +   Sitting Balance (Dynamic) Fair +   Standing Balance (Static) Fair   Standing Balance (Dynamic) Fair   Weight Shift Sitting Good   Weight Shift Standing Fair   Comments With FWW   Bed Mobility    Supine to Sit Supervised   ADL Assessment   Grooming Standing;Supervision   Lower Body Dressing Moderate Assist   Toileting Supervision   Functional Mobility   Sit to Stand Supervised   Bed, Chair, Wheelchair Transfer Supervised   Toilet Transfers Supervised   Transfer Method Stand Pivot   Mobility sup>sit, STS, toilet xfer   Activity Tolerance   Sitting in Chair 10+ mins post   Sitting Edge of Bed 6 mins   Standing 8-10 mins   Short Term Goals   Short Term Goal # 1 Patient will complete LB dressing supv with AE PRN while maintaining spinal precautions   Short Term Goal # 2 Patient will demo simulated tub transfer supv

## 2022-04-27 NOTE — ASSESSMENT & PLAN NOTE
No history of coronary artery disease  The patient does have history of GERD  CT PE is pending  Trend troponin and EKG  Could be related to esophageal spasm  If above tests are negative, consider stress test

## 2022-04-27 NOTE — CONSULTS
Hospital Medicine Consultation    Date of Service  4/26/2022    Referring Physician  Duane Weiss M.D.    Consulting Physician  Ren Sarabia M.D.    Reason for Consultation  Chest pain and hypoxia    History of Presenting Illness  66 y.o. female who was admitted for elective lumbar procedure and had L4- L5 laminectomy earlier today.  She developed chest pain and hypoxia later today and because of that medicine service was consulted for evaluation and management.  I saw and examined the patient by the bedside.  She stated that her chest pain is improving.  She described the pain as pressure-like sensation on the sternal area, radiated to her neck, 4 out of 10 intensity, constant in nature.  I will order EKG, CT PE study to rule out pulmonary embolism.  Another possibility is likely related to esophageal spasm from GERD.        Review of Systems  Review of Systems   Constitutional: Negative for chills, fever and weight loss.   HENT: Negative for congestion, hearing loss, nosebleeds, sinus pain and sore throat.    Eyes: Negative for blurred vision, pain, discharge and redness.   Respiratory: Positive for shortness of breath. Negative for cough, sputum production, wheezing and stridor.    Cardiovascular: Positive for chest pain. Negative for palpitations, orthopnea and claudication.   Gastrointestinal: Negative for abdominal pain, blood in stool, constipation, diarrhea, heartburn, nausea and vomiting.   Genitourinary: Negative for dysuria, frequency, hematuria and urgency.   Musculoskeletal: Negative for back pain, myalgias and neck pain.   Skin: Negative for itching and rash.   Neurological: Negative for dizziness, focal weakness, seizures and headaches.   Psychiatric/Behavioral: Negative for depression. The patient is not nervous/anxious and does not have insomnia.        Past Medical History   has a past medical history of Gastritis, High cholesterol, Hyperlipidemia, Hypertension, Pain, and PONV (postoperative  nausea and vomiting).    Surgical History   has a past surgical history that includes breast biopsy; us-needle core bx-breast panel (Left); cholecystectomy (2009); primary c section; lumbar laminectomy diskectomy (4/26/2022); and foraminotomy (4/26/2022).    Family History  family history includes Cancer in her mother; Heart Attack in her father.    Social History   reports that she has never smoked. She has never used smokeless tobacco. She reports previous alcohol use. She reports that she does not use drugs.    Medications  Current Facility-Administered Medications   Medication Dose Route Frequency Provider Last Rate Last Admin   • [START ON 4/27/2022] atorvastatin (LIPITOR) tablet 20 mg  20 mg Oral Q EVENING JOEL Lakhani.A.-C.       • gabapentin (NEURONTIN) capsule 300 mg  300 mg Oral TID JOEL Lakhani.A.-C.       • [START ON 4/27/2022] metoprolol SR (TOPROL XL) tablet 25 mg  25 mg Oral QDAY JOEL Lakhani.A.-C.       • Pharmacy Consult Request ...Pain Management Review 1 Each  1 Each Other PHARMACY TO DOSE JOEL Lakhani.A.-C.       • MD ALERT...DO NOT ADMINISTER NSAIDS or ASPIRIN unless ORDERED By Neurosurgery 1 Each  1 Each Other PRN JOEL Lakhani.A.-C.       • docusate sodium (COLACE) capsule 100 mg  100 mg Oral BID JOEL Lakhani.A.-C.       • senna-docusate (PERICOLACE or SENOKOT S) 8.6-50 MG per tablet 1 Tablet  1 Tablet Oral Nightly Jasmin Wan, JOEL.A.-C.       • senna-docusate (PERICOLACE or SENOKOT S) 8.6-50 MG per tablet 1 Tablet  1 Tablet Oral Q24HRS PRN JOEL Lakhani.A.-C.       • polyethylene glycol/lytes (MIRALAX) PACKET 1 Packet  1 Packet Oral BID PRN JOEL Lakhani.A.-C.       • magnesium hydroxide (MILK OF MAGNESIA) suspension 30 mL  30 mL Oral QDAY PRN JOEL Lakhani.A.-C.       • bisacodyl (DULCOLAX) suppository 10 mg  10 mg Rectal Q24HRS PRN Jasmin Wan P.A.-C.       • sodium phosphate (Fleet) enema 133 mL  1 Each Rectal  Once PRN JOEL Lakhani.A.-C.       • dextrose 5 % and 0.9 % NaCl with KCl 20 mEq infusion   Intravenous Continuous JOEL Lakhani.A.-C.       • acetaminophen (TYLENOL) tablet 1,000 mg  1,000 mg Oral Q6HRS JOEL Lakhani.A.-C.        Followed by   • [START ON 5/1/2022] acetaminophen (TYLENOL) tablet 1,000 mg  1,000 mg Oral Q6HRS PRN Jasmin Wan, JOEL.A.-C.       • oxyCODONE immediate-release (ROXICODONE) tablet 5 mg  5 mg Oral Q3HRS PRN Jasmin Wan, P.A.-C.        Or   • oxyCODONE immediate release (ROXICODONE) tablet 10 mg  10 mg Oral Q3HRS PRN Jasmin Wan, P.A.-C.        Or   • HYDROmorphone (Dilaudid) injection 0.5 mg  0.5 mg Intravenous Q3HRS PRN Jasmin Wan, JOEL.A.-C.       • ceFAZolin in dextrose (Ancef) IVPB premix 2 g  2 g Intravenous Q8HR Jasmin Wan, P.A.-C.       • diphenhydrAMINE (BENADRYL) tablet/capsule 25 mg  25 mg Oral Q6HRS PRN Jasmin Wan, P.A.-C.        Or   • diphenhydrAMINE (BENADRYL) injection 25 mg  25 mg Intravenous Q6HRS PRN Jasmin Wan, P.A.-C.       • ondansetron (ZOFRAN) syringe/vial injection 4 mg  4 mg Intravenous Q4HRS PRN Jasmin Wan, P.A.-C.   4 mg at 04/26/22 1622   • ondansetron (ZOFRAN ODT) dispertab 4 mg  4 mg Oral Q4HRS PRN Jasmin Wan, P.A.-C.       • methocarbamol (ROBAXIN) tablet 750 mg  750 mg Oral Q8HRS PRN Jasmin Wan, P.A.-C.   750 mg at 04/26/22 1338    Or   • cyclobenzaprine (Flexeril) tablet 10 mg  10 mg Oral Q8HRS PRN JOEL Lakhani.A.-C.        Or   • diazePAM (VALIUM) tablet 5 mg  5 mg Oral Q4HRS PRN JOEL Lakhani.A.-C.       • labetalol (NORMODYNE/TRANDATE) injection 10 mg  10 mg Intravenous Q HOUR PRN JOEL Lakhani.A.-C.       • benzocaine-menthol (Cepacol) lozenge 1 Lozenge  1 Lozenge Mouth/Throat Q2HRS PRN Jasmin Wan, P.A.-C.       • Respiratory Therapy Consult   Nebulization Continuous RT Duane Weiss M.D.       • calcium carbonate (TUMS) chewable tab 500 mg   500 mg Oral BID Duane Weiss M.D.   500 mg at 04/26/22 1624       Allergies  Allergies   Allergen Reactions   • Penicillins Hives     4/2022: tolerated cefazolin       Physical Exam  Temp:  [36.4 °C (97.5 °F)-37 °C (98.6 °F)] 36.4 °C (97.5 °F)  Pulse:  [55-98] 98  Resp:  [12-38] 14  BP: (115-179)/(58-93) 143/70  SpO2:  [93 %-99 %] 98 %    Physical Exam  Vitals reviewed.   Constitutional:       General: She is not in acute distress.     Appearance: Normal appearance. She is normal weight. She is not ill-appearing, toxic-appearing or diaphoretic.   HENT:      Head: Normocephalic and atraumatic.      Right Ear: Tympanic membrane, ear canal and external ear normal.      Left Ear: Tympanic membrane, ear canal and external ear normal.      Nose: No congestion or rhinorrhea.   Eyes:      Extraocular Movements: Extraocular movements intact.      Conjunctiva/sclera: Conjunctivae normal.      Pupils: Pupils are equal, round, and reactive to light.   Cardiovascular:      Rate and Rhythm: Normal rate and regular rhythm.      Pulses: Normal pulses.      Heart sounds: Normal heart sounds. No murmur heard.    No friction rub. No gallop.   Pulmonary:      Effort: Pulmonary effort is normal. No respiratory distress.      Breath sounds: Normal breath sounds. No stridor. No wheezing or rhonchi.   Abdominal:      General: Bowel sounds are normal. There is no distension.      Palpations: Abdomen is soft. There is no mass.      Tenderness: There is no abdominal tenderness. There is no guarding or rebound.      Hernia: No hernia is present.   Musculoskeletal:         General: No swelling or tenderness.      Cervical back: Normal range of motion and neck supple.   Skin:     General: Skin is warm.      Findings: No erythema.   Neurological:      General: No focal deficit present.      Mental Status: She is alert and oriented to person, place, and time.         Fluids  Date 04/26/22 0700 - 04/27/22 0659   Shift [id number] 7202-2698  9646-5385 24 Hour Total   INTAKE   I.V. 1038   1038   Shift Total 1038   1038   OUTPUT   Shift Total       Weight (kg) 88.2 88.2 88.2 88.2       Laboratory                          Imaging  DX-CHEST-PORTABLE (1 VIEW)   Final Result      1.  Mild enlargement of the cardiomediastinal silhouette.   2.  Central pulmonary vascular congestion and bilateral basilar atelectasis likely secondary to under aeration      DX-PORTABLE FLUORO > 1 HOUR   Final Result      Portable fluoroscopy utilized for 2 seconds.         INTERPRETING LOCATION: [address] [place] [address], [facility], [facility]      DX-SPINE-ANY ONE VIEW   Final Result      Portable intraoperative imaging with findings as described above.      CT-CTA CHEST PULMONARY ARTERY W/ RECONS    (Results Pending)       Assessment/Plan  * Lumbar radiculopathy- (present on admission)  Assessment & Plan  Post L4-L5 laminectomy  Pain control  Neurosurgery will be primary  PT and OT    Chest pain  Assessment & Plan  No history of coronary artery disease  The patient does have history of GERD  CT PE is pending  Trend troponin and EKG  Could be related to esophageal spasm  If above tests are negative, consider stress test    Acute respiratory failure with hypoxia (HCC)- (present on admission)  Assessment & Plan  Etiology atelectasis versus PE  Patient also complained about chest pain  CT PE study ordered  Will treat with anticoagulation if positive  Encourage incentive spirometer    Hypertension- (present on admission)  Assessment & Plan  Continue outpatient medications

## 2022-04-27 NOTE — CARE PLAN
Problem: Pain - Standard  Goal: Alleviation of pain or a reduction in pain to the patient’s comfort goal  Outcome: Progressing   Pt verbalizes pain control at this time. Pt educated on pain medications available.   Problem: Knowledge Deficit - Standard  Goal: Patient and family/care givers will demonstrate understanding of plan of care, disease process/condition, diagnostic tests and medications  Outcome: Progressing   Pt educated on POC and STAT CT. All questions answered at this time.  Problem: Fall Risk  Goal: Patient will remain free from falls  Outcome: Progressing   The patient is Watcher - Medium risk of patient condition declining or worsening         Progress made toward(s) clinical / shift goals: Pt educated on treatment plan. Pt verbalizes pain control.    Patient is not progressing towards the following goals:

## 2022-04-27 NOTE — CARE PLAN
Problem: Pain - Standard  Goal: Alleviation of pain or a reduction in pain to the patient’s comfort goal  Outcome: Progressing     Problem: Knowledge Deficit - Standard  Goal: Patient and family/care givers will demonstrate understanding of plan of care, disease process/condition, diagnostic tests and medications  Outcome: Progressing     Problem: Fall Risk  Goal: Patient will remain free from falls  Outcome: Progressing   The patient is Watcher - Medium risk of patient condition declining or worsening    Shift Goals  Clinical Goals: pain control, mobility  Patient Goals: rest    Progress made toward(s) clinical / shift goals: pain managed with scheduled medications and rest. POC discussed with pt and family-pt and family verbalized understanding. Pt calls appropriately, understands importance of calling before ambulating.    Patient is not progressing towards the following goals:

## 2022-04-27 NOTE — THERAPY
Physical Therapy   Initial Evaluation     Patient Name: Nila Villatoro  Age:  66 y.o., Sex:  female  Medical Record #: [id number]  Today's Date: 4/27/2022     Precautions  Precautions: Spinal / Back Precautions   Comments: no brace    Assessment  Patient is 66 y.o. female POD #1 L4-5 laminectomy (no brace).  used during evaluation and pt provided [place] spine packet for education on precautions, log roll, and AD. Pt completed bed mobility, transfers, and gait with FWW and SPV. Gait distance limited by lightheadedness assume from SpO2 dropping into mid 80s on RA. Pt given 0.5L and improved activity tolerance. She required CGA for stair negotiation, recommend family assist pt into and out of house for first few days. Patient will not be actively followed for physical therapy services at this time, however may be seen if requested by physician for 1 more visit within 30 days to address any discharge or equipment needs    Plan    Recommend Physical Therapy for Evaluation only     DC Equipment Recommendations: Front-Wheel Walker  Discharge Recommendations: Recommend home health for continued physical therapy services (with CGA to enter and exit home)        04/27/22 1155   Vitals   O2 (LPM) 0.5   O2 Delivery Device Nasal Cannula   Vitals Comments pt desaturated to mid 80s on RA   Prior Living Situation   Prior Services Home-Independent   Housing / Facility 1 Story House   Steps Into Home 3   Steps In Home 0   Equipment Owned None   Lives with - Patient's Self Care Capacity Spouse   Comments spouse works during the day. She thinks her daughter may be able to work from home or be able to leave work to check on her if needed   Prior Level of Functional Mobility   Bed Mobility Independent   Transfer Status Independent   Ambulation Independent   Distance Ambulation (Feet)   (community)   Assistive Devices Used None   Stairs Independent   Comments independent prior   Cognition    Level of  Consciousness Alert   Comments receptive, Ipad  usd   Active ROM Lower Body    Active ROM Lower Body  WDL   Strength Lower Body   Lower Body Strength  WDL   Sensation Lower Body   Lower Extremity Sensation   WDL   Balance Assessment   Sitting Balance (Static) Fair +   Sitting Balance (Dynamic) Fair +   Standing Balance (Static) Fair   Standing Balance (Dynamic) Fair   Weight Shift Sitting Good   Weight Shift Standing Fair   Comments FWW   Gait Analysis   Gait Level Of Assist Supervised   Assistive Device Front Wheel Walker   Distance (Feet) 50   # of Times Distance was Traveled 1   Deviation Shuffled Gait   # of Stairs Climbed 3   Level of Assist with Stairs Contact Guard Assist   Weight Bearing Status no restrictions   Comments distance limited by lightheadedness-- SpO2 dropped to mid 80s while ambulating on RA- RN aware   Bed Mobility    Supine to Sit Supervised   Scooting Supervised   Rolling Supervised   Comments log roll   Functional Mobility   Sit to Stand Supervised   Bed, Chair, Wheelchair Transfer Supervised   Toilet Transfers Supervised   Transfer Method Stand Step   Mobility in room and hallway with FWW   Education Group   Education Provided Role of Physical Therapist;Spine Precautions   Spine Precautions Patient Response Patient;Acceptance;Explanation;;Handout;Action Demonstration;Verbal Demonstration   Role of Physical Therapist Patient Response Patient;Acceptance;Explanation;;Action Demonstration   Anticipated Discharge Equipment and Recommendations   DC Equipment Recommendations Front-Wheel Walker   Discharge Recommendations Recommend home health for continued physical therapy services

## 2022-04-27 NOTE — ASSESSMENT & PLAN NOTE
Etiology atelectasis versus PE  Patient also complained about chest pain  CT PE study ordered  Will treat with anticoagulation if positive  Encourage incentive spirometer

## 2022-04-28 ENCOUNTER — APPOINTMENT (OUTPATIENT)
Dept: RADIOLOGY | Facility: MEDICAL CENTER | Age: 67
End: 2022-04-28
Attending: HOSPITALIST
Payer: COMMERCIAL

## 2022-04-28 PROBLEM — M54.16 LUMBAR RADICULOPATHY: Status: RESOLVED | Noted: 2022-04-26 | Resolved: 2022-04-28

## 2022-04-28 PROBLEM — R07.9 CHEST PAIN: Status: RESOLVED | Noted: 2022-04-26 | Resolved: 2022-04-28

## 2022-04-28 PROBLEM — J96.01 ACUTE RESPIRATORY FAILURE WITH HYPOXIA (HCC): Status: RESOLVED | Noted: 2022-04-26 | Resolved: 2022-04-28

## 2022-04-28 LAB
ALBUMIN SERPL BCP-MCNC: 3.5 G/DL (ref 3.2–4.9)
BUN SERPL-MCNC: 14 MG/DL (ref 8–22)
CALCIUM SERPL-MCNC: 8.5 MG/DL (ref 8.5–10.5)
CHLORIDE SERPL-SCNC: 105 MMOL/L (ref 96–112)
CO2 SERPL-SCNC: 28 MMOL/L (ref 20–33)
CREAT SERPL-MCNC: 0.73 MG/DL (ref 0.5–1.4)
ERYTHROCYTE [DISTWIDTH] IN BLOOD BY AUTOMATED COUNT: 46.2 FL (ref 35.9–50)
GFR SERPLBLD CREATININE-BSD FMLA CKD-EPI: 91 ML/MIN/1.73 M 2
GLUCOSE SERPL-MCNC: 108 MG/DL (ref 65–99)
HCT VFR BLD AUTO: 34.8 % (ref 37–47)
HGB BLD-MCNC: 11.1 G/DL (ref 12–16)
MAGNESIUM SERPL-MCNC: 1.9 MG/DL (ref 1.5–2.5)
MCH RBC QN AUTO: 29.5 PG (ref 27–33)
MCHC RBC AUTO-ENTMCNC: 31.9 G/DL (ref 33.6–35)
MCV RBC AUTO: 92.6 FL (ref 81.4–97.8)
PHOSPHATE SERPL-MCNC: 2.9 MG/DL (ref 2.5–4.5)
PLATELET # BLD AUTO: 227 K/UL (ref 164–446)
PMV BLD AUTO: 9.7 FL (ref 9–12.9)
POTASSIUM SERPL-SCNC: 4.3 MMOL/L (ref 3.6–5.5)
RBC # BLD AUTO: 3.76 M/UL (ref 4.2–5.4)
SODIUM SERPL-SCNC: 140 MMOL/L (ref 135–145)
TROPONIN T SERPL-MCNC: 8 NG/L (ref 6–19)
WBC # BLD AUTO: 9.8 K/UL (ref 4.8–10.8)

## 2022-04-28 PROCEDURE — G0378 HOSPITAL OBSERVATION PER HR: HCPCS

## 2022-04-28 PROCEDURE — A9270 NON-COVERED ITEM OR SERVICE: HCPCS | Performed by: INTERNAL MEDICINE

## 2022-04-28 PROCEDURE — 80069 RENAL FUNCTION PANEL: CPT

## 2022-04-28 PROCEDURE — 85027 COMPLETE CBC AUTOMATED: CPT

## 2022-04-28 PROCEDURE — A9270 NON-COVERED ITEM OR SERVICE: HCPCS | Performed by: PHYSICIAN ASSISTANT

## 2022-04-28 PROCEDURE — 99225 PR SUBSEQUENT OBSERVATION CARE,LEVEL II: CPT | Performed by: HOSPITALIST

## 2022-04-28 PROCEDURE — 84484 ASSAY OF TROPONIN QUANT: CPT

## 2022-04-28 PROCEDURE — 700102 HCHG RX REV CODE 250 W/ 637 OVERRIDE(OP): Performed by: PHYSICIAN ASSISTANT

## 2022-04-28 PROCEDURE — 700102 HCHG RX REV CODE 250 W/ 637 OVERRIDE(OP): Performed by: NEUROLOGICAL SURGERY

## 2022-04-28 PROCEDURE — 70450 CT HEAD/BRAIN W/O DYE: CPT

## 2022-04-28 PROCEDURE — 700111 HCHG RX REV CODE 636 W/ 250 OVERRIDE (IP): Performed by: HOSPITALIST

## 2022-04-28 PROCEDURE — 83735 ASSAY OF MAGNESIUM: CPT

## 2022-04-28 PROCEDURE — A9270 NON-COVERED ITEM OR SERVICE: HCPCS | Performed by: NEUROLOGICAL SURGERY

## 2022-04-28 PROCEDURE — 700102 HCHG RX REV CODE 250 W/ 637 OVERRIDE(OP): Performed by: INTERNAL MEDICINE

## 2022-04-28 RX ORDER — FUROSEMIDE 10 MG/ML
20 INJECTION INTRAMUSCULAR; INTRAVENOUS ONCE
Status: COMPLETED | OUTPATIENT
Start: 2022-04-28 | End: 2022-04-28

## 2022-04-28 RX ADMIN — SENNOSIDES AND DOCUSATE SODIUM 1 TABLET: 50; 8.6 TABLET ORAL at 19:46

## 2022-04-28 RX ADMIN — METHOCARBAMOL 750 MG: 750 TABLET ORAL at 11:24

## 2022-04-28 RX ADMIN — OMEPRAZOLE 20 MG: 20 CAPSULE, DELAYED RELEASE ORAL at 04:38

## 2022-04-28 RX ADMIN — CALCIUM CARBONATE 500 MG: 500 TABLET, CHEWABLE ORAL at 04:38

## 2022-04-28 RX ADMIN — DOCUSATE SODIUM 100 MG: 100 CAPSULE, LIQUID FILLED ORAL at 04:38

## 2022-04-28 RX ADMIN — ATORVASTATIN CALCIUM 20 MG: 20 TABLET, FILM COATED ORAL at 17:23

## 2022-04-28 RX ADMIN — OXYCODONE 5 MG: 5 TABLET ORAL at 04:38

## 2022-04-28 RX ADMIN — ACETAMINOPHEN 1000 MG: 500 TABLET ORAL at 17:22

## 2022-04-28 RX ADMIN — GABAPENTIN 300 MG: 300 CAPSULE ORAL at 08:01

## 2022-04-28 RX ADMIN — CALCIUM CARBONATE 500 MG: 500 TABLET, CHEWABLE ORAL at 17:23

## 2022-04-28 RX ADMIN — ACETAMINOPHEN 1000 MG: 500 TABLET ORAL at 04:38

## 2022-04-28 RX ADMIN — ACETAMINOPHEN 1000 MG: 500 TABLET ORAL at 11:24

## 2022-04-28 RX ADMIN — OXYCODONE 5 MG: 5 TABLET ORAL at 19:46

## 2022-04-28 RX ADMIN — OXYCODONE 5 MG: 5 TABLET ORAL at 11:24

## 2022-04-28 RX ADMIN — GABAPENTIN 300 MG: 300 CAPSULE ORAL at 17:23

## 2022-04-28 RX ADMIN — METOPROLOL SUCCINATE 25 MG: 25 TABLET, EXTENDED RELEASE ORAL at 04:38

## 2022-04-28 RX ADMIN — DOCUSATE SODIUM 100 MG: 100 CAPSULE, LIQUID FILLED ORAL at 17:25

## 2022-04-28 RX ADMIN — FUROSEMIDE 20 MG: 10 INJECTION, SOLUTION INTRAMUSCULAR; INTRAVENOUS at 11:24

## 2022-04-28 RX ADMIN — POLYETHYLENE GLYCOL 3350 1 PACKET: 17 POWDER, FOR SOLUTION ORAL at 19:46

## 2022-04-28 ASSESSMENT — ENCOUNTER SYMPTOMS
ABDOMINAL PAIN: 0
ORTHOPNEA: 0
HEMOPTYSIS: 0
NAUSEA: 0
NERVOUS/ANXIOUS: 1
CLAUDICATION: 0
FOCAL WEAKNESS: 0
SHORTNESS OF BREATH: 0
HEARTBURN: 0
FEVER: 0
BLURRED VISION: 0
SORE THROAT: 0
COUGH: 0
VOMITING: 0
SPUTUM PRODUCTION: 0
DIZZINESS: 0

## 2022-04-28 ASSESSMENT — PAIN DESCRIPTION - PAIN TYPE
TYPE: SURGICAL PAIN

## 2022-04-28 ASSESSMENT — PATIENT HEALTH QUESTIONNAIRE - PHQ9
2. FEELING DOWN, DEPRESSED, IRRITABLE, OR HOPELESS: NOT AT ALL
1. LITTLE INTEREST OR PLEASURE IN DOING THINGS: NOT AT ALL
SUM OF ALL RESPONSES TO PHQ9 QUESTIONS 1 AND 2: 0

## 2022-04-28 NOTE — PROGRESS NOTES
Neurosurgery Progress Note    Subjective:  No acute events over night  Right leg pain improved, some persistent pain in LLE      Exam:  A&O Albanian speaking  TADEO with FS  Incision CDi with dermabond  hvac 40    BP  Min: 113/68  Max: 134/71  Pulse  Av.3  Min: 59  Max: 68  Resp  Av.5  Min: 17  Max: 18  Temp  Av.5 °C (97.7 °F)  Min: 36.2 °C (97.1 °F)  Max: 36.9 °C (98.4 °F)  SpO2  Av.8 %  Min: 92 %  Max: 95 %    No data recorded                      Intake/Output                       22 07 - 22 0659 22 - 22 0659      Total  Total                 Intake    Total Intake -- -- -- -- -- --       Output    Urine  --  -- --  --  -- --    Number of Times Voided 1 x 2 x 3 x -- -- --    Drains  --  60 60  --  -- --    Output (mL) (Closed/Suction Drain 1 Inferior Back Hemovac) -- 60 60 -- -- --    Total Output -- 60 60 -- -- --       Net I/O     -- -60 -60 -- -- --            Intake/Output Summary (Last 24 hours) at 2022 0848  Last data filed at 2022 0400  Gross per 24 hour   Intake --   Output 60 ml   Net -60 ml            • atorvastatin  20 mg Q EVENING   • gabapentin  300 mg TID   • metoprolol SR  25 mg QDAY   • Pharmacy Consult Request  1 Each PHARMACY TO DOSE   • MD ALERT...DO NOT ADMINISTER NSAIDS or ASPIRIN unless ORDERED By Neurosurgery  1 Each PRN   • docusate sodium  100 mg BID   • senna-docusate  1 Tablet Nightly   • senna-docusate  1 Tablet Q24HRS PRN   • polyethylene glycol/lytes  1 Packet BID PRN   • magnesium hydroxide  30 mL QDAY PRN   • bisacodyl  10 mg Q24HRS PRN   • sodium phosphate  1 Each Once PRN   • dextrose 5 % and 0.9 % NaCl with KCl 20 mEq   Continuous   • acetaminophen  1,000 mg Q6HRS    Followed by   • [START ON 2022] acetaminophen  1,000 mg Q6HRS PRN   • oxyCODONE immediate-release  5 mg Q3HRS PRN    Or   • oxyCODONE immediate-release  10 mg Q3HRS PRN    Or   • HYDROmorphone  0.5 mg Q3HRS PRN   •  diphenhydrAMINE  25 mg Q6HRS PRN    Or   • diphenhydrAMINE  25 mg Q6HRS PRN   • ondansetron  4 mg Q4HRS PRN   • ondansetron  4 mg Q4HRS PRN   • methocarbamol  750 mg Q8HRS PRN    Or   • cyclobenzaprine  10 mg Q8HRS PRN    Or   • diazePAM  5 mg Q4HRS PRN   • labetalol  10 mg Q HOUR PRN   • benzocaine-menthol  1 Lozenge Q2HRS PRN   • Respiratory Therapy Consult   Continuous RT   • calcium carbonate  500 mg BID   • omeprazole  20 mg DAILY       Assessment and Plan:  Hospital day #3  POD #2 L4-5 laminectomy  Prophylactic anticoagulation: no         Start date/time: tbd    Neuro exam stable  hospitalist team consulted yesterday for episode of hypoxia & CP, appreciate medical mgmt & discharge clearance recs when appropriate  CTA negative  Still on oxygen supplement  PT/OT   Recommending home health  DC hvac     Rx meds to beds  Possible home today if accepted by HH and cleared by hospitalist

## 2022-04-28 NOTE — PROGRESS NOTES
Bedside report received from Sadaf Eason RN, assessment completed.    Discussed POC with pt-pt verbalized understanding. Call light within reach, bed in lowest position, all needs met at this time.

## 2022-04-28 NOTE — DISCHARGE PLANNING
"  Date:  4/28/2022      Anticipated Discharge Disposition: DC home with walker and out-pt vs home care therapy      Action:  CM met with team for MDR.  Plan to dc patient home.  Lives with spouse who works.  Patient has [employer] commercial plan, and there is a limited net work in [place], [place].  [place] referrals placed to all home care agencies contracted with [facility].  Choice form completed and faxed to [facility].  Walker was delivered to bedside.     Barriers to Discharge:     1) no accepting home care; [facility] is pending  2) patient is Luxembourger speaking  3) weaning oxygen for dc       Plan: CM to follow up for any accepting home care, and confirm patient weaned off oxygen        Cherri Ross RN, BSN, CM  Case Management  \"[employer]\"  E-mail: [email or web address]  Phone: [phone]    "

## 2022-04-28 NOTE — DISCHARGE SUMMARY
Discharge Summary    CHIEF COMPLAINT ON ADMISSION  No chief complaint on file.      Reason for Admission  LUMBAR SPONDYLOSIS     Admission Date  4/26/2022    CODE STATUS  Full Code    HPI & HOSPITAL COURSE  This a 66-year-old female with a past medical history significant for hypertension, hyperlipidemia, and GERD was admitted for elective lumbar procedure, had L4-L5 laminectomy on 4/26/2022 by Dr. Duane Weiss.     Internal medicine  team was consulted as patient was having chest pain associated with hypoxia.  Patient reported having chest pressure on the sternal  area radiating to the neck 4-10 in intensity.  Troponin x2 negative, CT PE did not show any pulmonary embolism, EKG showed  no acute ST and T wave changes.    During the stay in the hospital, her blood pressure is noted to be well controlled, heart rate 51-68.  She was hypoxic and slightly, which has been resolved at the time of discharge.    Blood work has been unremarkable, noted to have hemoglobin/hematocrit 11.1.  PT/OT has evaluate the patient, recommend home health.  At this time patient is medically stable to be discharged home with home health.  I discussed the plan of care with neurosurgery CARTER franco.    Therefore, she is discharged in good and stable condition to home with organized home healthcare and close outpatient follow-up.    Discharge Date  4/29/2022    FOLLOW UP ITEMS POST DISCHARGE  John Lockett M.D.  Neurosurgery   DISCHARGE DIAGNOSES  Principal Problem (Resolved):    Lumbar radiculopathy POA: Yes  Active Problems:    Hypertension POA: Yes  Resolved Problems:    Acute respiratory failure with hypoxia (HCC) POA: Yes    Chest pain POA: Unknown      FOLLOW UP  No future appointments.  No follow-up provider specified.    MEDICATIONS ON DISCHARGE     Medication List      START taking these medications      Instructions   oxyCODONE-acetaminophen 5-325 MG Tabs  Commonly known as: PERCOCET   Take 1 Tablet by mouth every four hours  as needed for Moderate Pain for up to 7 days.  Dose: 1 Tablet     tizanidine 4 MG Tabs  Commonly known as: ZANAFLEX   Take 1 Tablet by mouth 3 times a day for 14 days.  Dose: 4 mg        CHANGE how you take these medications      Instructions   atorvastatin 20 MG Tabs  What changed: See the new instructions.  Commonly known as: LIPITOR   Doctor's comments: PT needs appt for more refills  TOME BRANDY TABLETA TODOS LOS WHITFIELD EN LA NOCHE     metoprolol SR 25 MG Tb24  What changed: when to take this  Commonly known as: TOPROL XL   TOME BRANDY TABLETA TODOS LOS WHITFIELD        CONTINUE taking these medications      Instructions   gabapentin 300 MG Caps  Commonly known as: NEURONTIN   Take 300 mg by mouth 3 times a day.  Dose: 300 mg            Allergies  Allergies   Allergen Reactions   • Penicillins Hives     4/2022: tolerated cefazolin       DIET  Orders Placed This Encounter   Procedures   • Diet Order Diet: Regular     Standing Status:   Standing     Number of Occurrences:   1     Order Specific Question:   Diet:     Answer:   Regular [1]       ACTIVITY  As tolerated.  Weight bearing as tolerated    CONSULTATIONS  Neurosurgery    PROCEDURES    L4, L5 laminectomy  L3-4, L4-5, L5-S1 medial facetectomy and bilateral foraminotomies     LABORATORY  Lab Results   Component Value Date    SODIUM 140 04/28/2022    POTASSIUM 4.3 04/28/2022    CHLORIDE 105 04/28/2022    CO2 28 04/28/2022    GLUCOSE 108 (H) 04/28/2022    BUN 14 04/28/2022    CREATININE 0.73 04/28/2022    CREATININE 0.70 05/21/2010    CREATININE 0.70 05/21/2010    GLOMRATE >59 05/21/2010    GLOMRATE >59 05/21/2010        Lab Results   Component Value Date    WBC 9.8 04/28/2022    WBC 8.6 05/21/2010    WBC 8.6 05/21/2010    HEMOGLOBIN 11.1 (L) 04/28/2022    HEMATOCRIT 34.8 (L) 04/28/2022    PLATELETCT 227 04/28/2022        Total time of the discharge process exceeds 35  minutes.

## 2022-04-28 NOTE — HOSPITAL COURSE
This a 66-year-old female with a past medical history significant for hypertension, hyperlipidemia, and GERD was admitted for elective lumbar procedure, had L4-L5 laminectomy on 4/26/2022 by Dr. Duane Weiss.   Internal medicine  team was consulted as patient was having chest pain associated with hypoxia.  Patient reported having chest pressure on the sternal  area radiating to the neck 4-10 in intensity.  Troponin x2 negative, CT PE did not show any pulmonary embolism, EKG showed  no acute ST and T wave changes.    During the stay in the hospital, her blood pressure is noted to be well controlled, heart rate 51-68.  She was hypoxic and slightly, which has been resolved at the time of discharge.    Blood work has been unremarkable, noted to have hemoglobin/hematocrit 11.1.  PT/OT has evaluate the patient, recommend home health.  At this time patient is medically stable to be discharged home with home health.  I discussed the plan of care with neurosurgery CARTER franco.    She was noted to have assymetric puil. CT scan of the head was obtained which has been unremarkable.

## 2022-04-28 NOTE — CARE PLAN
Problem: Pain - Standard  Goal: Alleviation of pain or a reduction in pain to the patient’s comfort goal  Outcome: Progressing     Problem: Fall Risk  Goal: Patient will remain free from falls  Outcome: Progressing   The patient is Stable - Low risk of patient condition declining or worsening    Shift Goals  Clinical Goals: pain control, mobility, drain output  Patient Goals: rest, pain control    Progress made toward(s) clinical / shift goals: pain managed with medication and rest. Pt call appropriately, does not get up without help.    Patient is not progressing towards the following goals:

## 2022-04-28 NOTE — PROGRESS NOTES
Hospital Medicine Daily Progress Note    Date of Service  4/28/2022    Chief Complaint  Nila Villatoro is a 66 y.o. female admitted 4/26/2022 with No chief complaint on file.        Hospital Course  This a 66-year-old female with a past medical history significant for hypertension, hyperlipidemia, and GERD was admitted for elective lumbar procedure, had L4-L5 laminectomy on 4/26/2022 by Dr. Duane Weiss.   Internal medicine  team was consulted as patient was having chest pain associated with hypoxia.  Patient reported having chest pressure on the sternal  area radiating to the neck 4-10 in intensity.  Troponin x2 negative, CT PE did not show any pulmonary embolism, EKG showed  no acute ST and T wave changes.    During the stay in the hospital, her blood pressure is noted to be well controlled, heart rate 51-68.  She was hypoxic and slightly, which has been resolved at the time of discharge.    Blood work has been unremarkable, noted to have hemoglobin/hematocrit 11.1.  PT/OT has evaluate the patient, recommend home health.  At this time patient is medically stable to be discharged home with home health.  I discussed the plan of care with neurosurgery CARTER franco.    She was noted to have assymetric puil. CT scan of the head was obtained which has been unremarkable.      Interval Problem Update  No acute events overnight, vital sign has been stable, heart rate of 59-68.  Patient saturating well on 1.5 L of oxygen, nursing staff is monitoring to wean the patient off of oxygen.  --Pending PT/OT to evaluate and recommend home meds, med has been ordered, case management working on it    I have personally seen and examined the patient at bedside. I discussed the plan of care with patient, bedside RN, charge RN and .    Consultants/Specialty  neurosurgery    Code Status  Full Code    Disposition  Patient is not medically cleared for discharge.   Anticipate discharge to to home with organized home  healthcare and close outpatient follow-up.  I have placed the appropriate orders for post-discharge needs.    Review of Systems  Review of Systems   Constitutional: Negative for fever and malaise/fatigue.   HENT: Negative for congestion and sore throat.    Eyes: Negative for blurred vision.   Respiratory: Negative for cough, hemoptysis, sputum production and shortness of breath.    Cardiovascular: Negative for chest pain, orthopnea and claudication.   Gastrointestinal: Negative for abdominal pain, heartburn, nausea and vomiting.   Neurological: Negative for dizziness and focal weakness.   Psychiatric/Behavioral: The patient is nervous/anxious.    All other systems reviewed and are negative.       Physical Exam  Temp:  [36.2 °C (97.1 °F)-36.5 °C (97.7 °F)] 36.3 °C (97.4 °F)  Pulse:  [51-68] 51  Resp:  [16-17] 16  BP: (113-134)/(63-71) 124/63  SpO2:  [92 %-94 %] 92 %    Physical Exam  Vitals and nursing note reviewed.   Constitutional:       Appearance: Normal appearance.   HENT:      Head: Normocephalic and atraumatic.   Cardiovascular:      Rate and Rhythm: Normal rate.   Abdominal:      General: Bowel sounds are normal.      Palpations: Abdomen is soft.   Musculoskeletal:      Cervical back: Neck supple.      Right lower leg: No edema.   Skin:     General: Skin is warm.   Neurological:      Mental Status: She is alert and oriented to person, place, and time.         Fluids    Intake/Output Summary (Last 24 hours) at 4/28/2022 1355  Last data filed at 4/28/2022 1000  Gross per 24 hour   Intake 480 ml   Output 60 ml   Net 420 ml       Laboratory  Recent Labs     04/28/22  1026   WBC 9.8   RBC 3.76*   HEMOGLOBIN 11.1*   HEMATOCRIT 34.8*   MCV 92.6   MCH 29.5   MCHC 31.9*   RDW 46.2   PLATELETCT 227   MPV 9.7     Recent Labs     04/28/22  1026   SODIUM 140   POTASSIUM 4.3   CHLORIDE 105   CO2 28   GLUCOSE 108*   BUN 14   CREATININE 0.73   CALCIUM 8.5                   Imaging  CT-HEAD W/O   Final Result      No CT  evidence of acute infarct, hemorrhage or mass.         CT-CTA CHEST PULMONARY ARTERY W/ RECONS   Final Result      1.  No evidence of pulmonary embolism.   2.  Mild dependent atelectasis. No other acute cardiopulmonary abnormality.   3.  Atherosclerotic changes. Coronary artery atherosclerotic disease         DX-CHEST-PORTABLE (1 VIEW)   Final Result      1.  Mild enlargement of the cardiomediastinal silhouette.   2.  Central pulmonary vascular congestion and bilateral basilar atelectasis likely secondary to under aeration      DX-PORTABLE FLUORO > 1 HOUR   Final Result      Portable fluoroscopy utilized for 2 seconds.         INTERPRETING LOCATION: [address] [place] [address], [facility]      DX-SPINE-ANY ONE VIEW   Final Result      Portable intraoperative imaging with findings as described above.           Assessment/Plan  Lumbar radiculopathy- (present on admission)  Assessment & Plan  Post L4-L5 laminectomy  Pain control  PT and OT resc home with home health, pending      Chest pain  Assessment & Plan  No history of coronary artery disease  The patient does have history of GERD  CT PE did not show pulmonary embolism.  Troponin trend unremarkable, EKG also any ST and T wave consistent with ischemia.  Patient chest pain has resolved    Acute respiratory failure with hypoxia (HCC)- (present on admission)  Assessment & Plan  Etiology atelectasis      Hypertension- (present on admission)  Assessment & Plan  Continue outpatient medications     VTE prophylaxis: SCDs/TEDs

## 2022-04-28 NOTE — DISCHARGE PLANNING
Received Choice form at 0924  Agency/Facility Name: [facility]   Referral sent per Choice form @ 0939     Received Choice form at 0940  Agency/Facility Name: [facility]  Referral sent per Choice form @ 0944     Addendum @ 1014:  Received Choice form at 0940  Agency/Facility Name: [facility]  Referral sent per Choice form @ 1013     KRISHNA robles.

## 2022-04-28 NOTE — FACE TO FACE
Face to Face Supporting Documentation - Home Health    The encounter with this patient was in whole or in part the primary reason for home health admission.    Date of encounter:   Patient:                    MRN:                       YOB: 2021  Nila Villatoro  [id number]  1955     Home health to see patient for:  Skilled Nursing care for assessment, interventions & education, Physical Therapy evaluation and treatment and Occupational therapy evaluation and treatment    Skilled need for:  Medication Management med management    Skilled nursing interventions to include:  Comment: med management    Homebound status evidenced by:  Need the aid of supportive devices such as crutches, canes, wheelchairs or walkers. Leaving home requires a considerable and taxing effort. There is a normal inability to leave the home.    Community Physician to provide follow up care: John Lockett M.D.     Optional Interventions? No      I certify the face to face encounter for this home health care referral meets the CMS requirements and the encounter/clinical assessment with the patient was, in whole, or in part, for the medical condition(s) listed above, which is the primary reason for home health care. Based on my clinical findings: the service(s) are medically necessary, support the need for home health care, and the homebound criteria are met.  I certify that this patient has had a face to face encounter by myself.  Lalitha Luu M.D. - NPI: [id number]

## 2022-04-29 ENCOUNTER — PHARMACY VISIT (OUTPATIENT)
Dept: PHARMACY | Facility: MEDICAL CENTER | Age: 67
End: 2022-04-29
Payer: COMMERCIAL

## 2022-04-29 VITALS
RESPIRATION RATE: 17 BRPM | TEMPERATURE: 97 F | HEIGHT: 63 IN | OXYGEN SATURATION: 95 % | WEIGHT: 194.45 LBS | HEART RATE: 65 BPM | SYSTOLIC BLOOD PRESSURE: 129 MMHG | BODY MASS INDEX: 34.45 KG/M2 | DIASTOLIC BLOOD PRESSURE: 66 MMHG

## 2022-04-29 PROCEDURE — 700102 HCHG RX REV CODE 250 W/ 637 OVERRIDE(OP): Performed by: INTERNAL MEDICINE

## 2022-04-29 PROCEDURE — 99217 PR OBSERVATION CARE DISCHARGE: CPT | Performed by: HOSPITALIST

## 2022-04-29 PROCEDURE — A9270 NON-COVERED ITEM OR SERVICE: HCPCS | Performed by: PHYSICIAN ASSISTANT

## 2022-04-29 PROCEDURE — 700102 HCHG RX REV CODE 250 W/ 637 OVERRIDE(OP): Performed by: PHYSICIAN ASSISTANT

## 2022-04-29 PROCEDURE — 700102 HCHG RX REV CODE 250 W/ 637 OVERRIDE(OP): Performed by: NEUROLOGICAL SURGERY

## 2022-04-29 PROCEDURE — G0378 HOSPITAL OBSERVATION PER HR: HCPCS

## 2022-04-29 PROCEDURE — A9270 NON-COVERED ITEM OR SERVICE: HCPCS | Performed by: NEUROLOGICAL SURGERY

## 2022-04-29 PROCEDURE — A9270 NON-COVERED ITEM OR SERVICE: HCPCS | Performed by: INTERNAL MEDICINE

## 2022-04-29 RX ADMIN — GABAPENTIN 300 MG: 300 CAPSULE ORAL at 07:46

## 2022-04-29 RX ADMIN — GABAPENTIN 300 MG: 300 CAPSULE ORAL at 00:00

## 2022-04-29 RX ADMIN — OXYCODONE 5 MG: 5 TABLET ORAL at 06:16

## 2022-04-29 RX ADMIN — ACETAMINOPHEN 1000 MG: 500 TABLET ORAL at 04:44

## 2022-04-29 RX ADMIN — METOPROLOL SUCCINATE 25 MG: 25 TABLET, EXTENDED RELEASE ORAL at 04:46

## 2022-04-29 RX ADMIN — CALCIUM CARBONATE 500 MG: 500 TABLET, CHEWABLE ORAL at 04:45

## 2022-04-29 RX ADMIN — MAGNESIUM HYDROXIDE 30 ML: 400 SUSPENSION ORAL at 04:42

## 2022-04-29 RX ADMIN — ACETAMINOPHEN 1000 MG: 500 TABLET ORAL at 00:00

## 2022-04-29 RX ADMIN — DOCUSATE SODIUM 100 MG: 100 CAPSULE, LIQUID FILLED ORAL at 04:44

## 2022-04-29 RX ADMIN — OMEPRAZOLE 20 MG: 20 CAPSULE, DELAYED RELEASE ORAL at 04:43

## 2022-04-29 ASSESSMENT — PAIN DESCRIPTION - PAIN TYPE
TYPE: SURGICAL PAIN
TYPE: ACUTE PAIN

## 2022-04-29 NOTE — CARE PLAN
The patient is Stable - Low risk of patient condition declining or worsening    Shift Goals  Clinical Goals: up to chair for all meals  Patient Goals: rest, pain control    Progress made toward(s) clinical / shift goals:    Problem: Fall Risk  Goal: Patient will remain free from falls  Outcome: Progressing  Note: Safety precautions are in place including bed locked and in lowest position, upper bed rails up, bed alarm on, call light within reach, treaded socks on, tray table and personal belongings within reach.        Patient is not progressing towards the following goals:

## 2022-04-29 NOTE — PROGRESS NOTES
0655 Patient's sitting up in a chair. Bedside report received from ERMA Fernandez RN at the beginning of the shift.     0709 New order received and acknowledged for the patient to be discharged home once accepted by Home Health from Dr Luu.    0744 Patient's sitting up in a chair. Utilized Lina ( EIN # [id number]). Educated on the importance/use of IS at least 10x every hour while awake, able to reach 2250. Rates right leg pain 3/10, declines medication at this time. Fall protocol in effect. Call light within reach. Reminded patient to call for assist. Assessment completed. No distress noted. Plan of care reviewed with the patient. Verbalized understanding.    0920 Patient's sitting up in bed, having breakfast. No distress noted.    1112  Patient was discharged with patient's belongings, FWW, meds to bed will be picked up by Daughter (Nesha, spoke to her over the phone), [facility] to follow  accompanied by one female CNA  to Discharge [facility].

## 2022-04-29 NOTE — DISCHARGE PLANNING
Hospital Care Management- Medical Social Work      Per Epic, pt has been accepted to [facility].

## 2022-04-29 NOTE — CARE PLAN
The patient is Stable - Low risk of patient condition declining or worsening    Shift Goals  Clinical Goals: pain control  Patient Goals: pain contorl, to be discharged home  Family Goals: n/a    Progress made toward(s) clinical / shift goals:      Patient is not progressing towards the following goals:    Problem: Fall Risk  Goal: Patient will remain free from falls  Outcome: Progressing  Note:     Fall protocol in effect. Non skid socks in use. Call light within reach. Reminded patient to call for assist. Hourly rounds in effect. Kept room clutter free. Reminded patient to call for assist. Verbalized understanding.      Problem: Knowledge Deficit - Standard  Goal: Patient and family/care givers will demonstrate understanding of plan of care, disease process/condition, diagnostic tests and medications  Outcome: Progressing    POC discussed with the patient. Discharge instructions given by RN at Discharge Lounge. Verbalized understanding.         Problem: Pain - Standard  Goal: Alleviation of pain or a reduction in pain to the patient’s comfort goal  Outcome: Progressing  Note: Educated on pain scale. Encouraged to verbalize pain. Will medicate as per MAR.    the patient. Questions answered. Verbalized understanding.      Utilizied  (see  Services).

## 2022-04-29 NOTE — DISCHARGE INSTRUCTIONS
Discharge Instructions    Discharged to home by car with relative. Discharged via wheelchair, hospital escort: Yes.  Special equipment needed: Not Applicable    Be sure to schedule a follow-up appointment with your primary care doctor or any specialists as instructed.     Discharge Plan:   Diet Plan: Discussed  Activity Level: Discussed  Confirmed Follow up Appointment: Appointment Scheduled  Confirmed Symptoms Management: Discussed  Medication Reconciliation Updated: Yes    I understand that a diet low in cholesterol, fat, and sodium is recommended for good health. Unless I have been given specific instructions below for another diet, I accept this instruction as my diet prescription.   Other diet:     Special Instructions:     Per MD:    Leave incision open to air. Dermabond glue will flake off over the next 2-3 weeks.     OK to shower & pat dry starting 24hr after drain removed    No soaking in hot tubs, baths, pools, etc.    Avoid repetitive bending, lifting over 10lbs, twisting. We encourage you to take frequent walks as tolerated    Do not drive or consume alcohol while taking narcotic pain medications. Do not take additional acetaminophen (tylenol) without consulting our office.     Do not take NSAIDs (such as ibuprofen or naproxen) or Aspirin unless you have been told otherwise by Dr Weiss's team    Follow up at [facility] office in 2 weeks. Call with questions or concerns @ [phone]        Laminectomía lumbar - Cuidados posteriores   (Lumbar Laminectomy, Care After)   Siga estas instrucciones debbie las próximas semanas. Estas indicaciones le proporcionan información general acerca de cómo deberá cuidarse después del procedimiento. El médico también podrá darle instrucciones más específicas. El tratamiento se nelson planificado de acuerdo a las prácticas médicas actuales, odilia a veces se producen problemas. Comuníquese con el médico si tiene algún problema o tiene dudas después del  procedimiento.  INSTRUCCIONES PARA EL CUIDADO EN EL HOGAR   · Controle la incisión dos veces por día para buscar signos de infección. Algunos signos son: mal olor, secreción verdosa o amarillenta por la herida, aumento del dolor o aumento de la inflamación en la incisión.  · Cambie el vendaje a las 24-36 horas posteriores a la cirugía, o según lo que le indiquen.  · Podrá darse chey ducha chey vez que le quiten el vendaje, o cuando se lo indiquen. Evite los maggie de inmersión o nadar en piscinas debbie 3 semanas, o hasta que la incisión esté completamente curada. Si tiene suturas o grapas, podrán retirarlas en las 2 ó 3 semanas posteriores a la cirugía, o según se lo indique valverde médico.  · Los ejercicios diarios le ayudarán a evitar que el problema se presente nuevamente. Se le permite caminar. Puede usar chey cinta sin inclinación. Si siente molestias suspenda las actividades. También podrá subir y bajar escaleras en la medida que lo tolere.  · No levante ningún peso mayor 15 lb (6 Kg). Evite inclinarse hacia adelante o girar la cintura. En valverde lugar, doble las rodillas.  · Mantenga la fuerza y amplitud de movimientos de la forma en que se le indique.  · No conduzca vehículos debbie 2 - 3 semanas, o según lo que le indique valverde médico. Puede viajar distancias cortas por 20 ó 30 minutos tri pasajero. Recline el asiento del acompañante para más comodidad.  · Limite el tiempo para permanecer sentado a intervalos de 20 ó 30 minutos. Deberá recostarse o caminar entre los períodos que permanezca sentado. No hay límites para estar sentado en chey reposera.  · Sólo tome medicamentos de venta neris o recetados para calmar el dolor, el malestar o bajar la fiebre, según las indicaciones de valverde médico.  SOLICITE ATENCIÓN MÉDICA SI:   · Aumenta el sangrado de la herida (más allá de chey pequeña tawana).  · Presenta enrojecimiento, inflamación o aumento del dolor en la herida.  · Aparece pus en la herida.  · Tiene fiebre por más de 2 -  3 días.  · Advierte un olor fétido que proviene de la herida o del vendaje.  · Siente cada vez más dolor en la herida.  SOLICITE ATENCIÓN MÉDICA DE INMEDIATO SI:   · Aparece chey erupción cutánea.  · Tiene dificultad para respirar.  · Tiene algún problema alérgico.  · Comienza a doler la luz o tiene rigidez en el ann marie, y esto no responde a analgésicos.  · No puede orinar.  · Comienza a sentir dolor, adormecimiento o debilidad en las nalgas o en las extremidades inferiores.  ASEGÚRESE DE QUE:   · Comprende estas instrucciones.  · Controlará valverde afección.  · Recibirá ayuda de inmediato si no mejora o si empeora.     Esta información no tiene tri fin reemplazar el consejo del médico. Asegúrese de hacerle al médico cualquier pregunta que tenga.     Document Released: 08/20/2014  [employer] Interactive Patient Education ©2016 Elsevier Inc.    Acetaminophen; Oxycodone tablets  ¿Qué es dayana medicamento?  El ACETAMINOFENO; OXICODONA es un analgésico. Se usa para tratar el dolor moderado a severo.  Dayana medicamento puede ser utilizado para otros usos; si tiene alguna pregunta consulte con valverde proveedor de atención médica o con valverde farmacéutico.  MARCAS COMUNES: Endocet, Magnacet, Nalocet, Narvox, Percocet, Perloxx, Primalev, Primlev, Roxicet, Xolox  ¿Qué le geri informar a mi profesional de la dimitri antes de sheryl dayana medicamento?  Necesita saber si usted presenta alguno de los siguientes problemas o situaciones:  · tumor cerebral  · enfermedad de Crohn, enfermedad intestinal inflamatoria o colitis ulcerativa  · abuso de drogas o drogadicción  · lesión de la luz  · problemas cardiacos o circulatorios  · si consume alcohol con frecuencia  · enfermedad renal o problemas al orinar  · enfermedad hepática  · enfermedad pulmonar, asma o dificultades al respirar  · chey reacción alérgica o inusual al acetaminofeno, a la oxicodona, a otros analgésicos opiáceos, a otros medicamentos, alimentos, colorantes o conservantes  · si está  embarazada o buscando quedar embarazada  · si está amamantando a un bebé  ¿Cómo geri utilizar dayana medicamento?  [place] dayana medicamento por vía oral con un vaso lleno de agua. Siga las instrucciones de la etiqueta del medicamento. Puede tomarlo con o sin alimentos. Si el medicamento le produce malestar estomacal, tómelo con alimentos. [place] valverde medicamento a intervalos regulares. No lo tome con chey frecuencia mayor a la indicada.  Valverde farmacéutico le dará chey Guía del medicamento especial (MedGuide, valverde nombre en inglés) con cada receta y en cada ocasión que la vuelva a surtir. Asegúrese de leer esta información cada vez cuidadosamente.  Hable con valverde pediatra para informarse acerca del uso de dayana medicamento en niños. Puede requerir atención especial.  Sobredosis: Póngase en contacto inmediatamente con un centro toxicológico o chey yue de urgencia si usted tammie que haya tomado demasiado medicamento.  ATENCIÓN: Dayana medicamento es solo para usted. No comparta dayana medicamento con nadie.  ¿Qué sucede si me olvido de chey dosis?  Si olvida chey dosis, tómela lo antes posible. Si es jaida la hora de la próxima dosis, tome sólo leif dosis. No tome dosis adicionales o dobles.  ¿Qué puede interactuar con dayana medicamento?  Esta medicina puede interactuar con los siguientes medicamentos:  alcohol antihistamínicos para alergia, tos y resfrío medicamentos antivirales para el VIH o SIDA atropina ciertos antibióticos, tales tri claritromicina, eritromicina, linezolida, rifampicina ciertos medicamentos para la ansiedad o para conciliar el sueño ciertos medicamentos para problemas de vejiga, tales tri oxibutinina, tolterodina ciertos medicamentos para la depresión, tri amitriptilina, fluoxetina, sertralina ciertos medicamentos para infecciones micóticas, tales tri quetoconazol, itraconazol, voriconazol ciertos medicamentos para la migraña, tales tri almotriptán, eletriptán, frovatriptán, naratriptán, rizatriptán, sumatriptán,  zolmitriptán ciertos medicamentos para las náuseas o los vómitos, tales tri dolasetrón, ondansetrón, palonosetrón ciertos medicamentos para el mal de Parkinson, tales tri benzatropina, trihexifenidilo ciertos medicamentos para convulsiones, tales tri fenobarbital, fenitoína, primidona ciertos medicamentos para problemas estomacales, tales tri diciclomina, hiosciamina ciertos medicamentos para el mareo por movimiento, joi tri escopolamina diuréticos anestésicos generales, tales tri halotano, isoflurano, metoxiflurano, propofol ipratropio anestésicos locales, tales tri lidocaína, pramoxina, tetracaína IMAO, tales tri Carbex, Eldepryl, Marplan, [place] y [place] medicamentos para relajar los músculos antes de chey cirugía radha de metileno nilotinib otros medicamentos con acetaminofeno otros medicamentos narcóticos para el dolor o la tos fenotiazinas, tales tri clorpromazina, mesoridazina, proclorperazina, tioridazina  Puede ser que esta lista no menciona todas las posibles interacciones. Informe a valverde profesional de la dimitri de todos los productos a base de hierbas, medicamentos de venta neris o suplementos nutritivos que esté tomando. Si usted fuma, consume bebidas alcohólicas o si utiliza drogas ilegales, indíqueselo también a valverde profesional de la dimitir. Algunas sustancias pueden interactuar con valverde medicamento.  ¿A qué geri estar atento al usar dayana medicamento?  Informe a valverde médico o a valverde profesional de la dimitri si el dolor no desaparece, si empeora, o si tiene un dolor nuevo o diferente. Es posible que desarrolle tolerancia al medicamento. Tolerancia significa que necesitará chey dosis más mayela del medicamento para aliviar el dolor. La tolerancia es normal y previsible si aissatou dayana medicamento por mucho tiempo.  No deje de usar valverde medicamento repentinamente porque puede desarrollar chey reacción grave. Valverde cuerpo se acostumbra al medicamento. [place] NO significa que usted es adicto. La adicción es chye conducta  relacionada a la obtención y el uso de chey droga por razones que no son médicas. Si usted tiene dolor, tiene chey razón médica para sheryl el analgésico. Valverde médico le dirá cuánto medicamento sheryl. Si valverde médico desea que deje de usar el medicamento, la dosis se irá disminuyendo lentamente debbie un tiempo para evitar cualquier efecto secundario.  Existen distintos tipos de medicamentos narcóticos (opiáceos). Si aissatou más de un tipo al mismo tiempo o si está tomando otro medicamento que también causa somnolencia, es posible que tenga más efectos secundarios. Entréguele a valverde proveedor de atención médica chey lista de todos los medicamentos que usa. Valverde médico le dirá cuánto medicamento sheryl. No tome más medicamento que lo indicado. Llame al servicio de emergencias para recibir ayuda si tiene problemas para respirar o somnolencia inusual.  No tome con dayana medicamento otros medicamentos que contengan acetaminofeno. Siempre freya atentamente las etiquetas. Si tiene alguna pacheco, pregúntele a valverde médico o farmacéutico.  Si aissatou demasiado acetaminofeno, busque ayuda médica de inmediato. Demasiado acetaminofeno puede ser muy peligroso y causar daño hepático. Incluso si no tiene síntomas, es importante obtener ayuda de inmediato.  Puede experimentar somnolencia o mareos. No conduzca, no utilice maquinaria ni jasper nada que le exija permanecer en estado de alerta hasta que sepa cómo le afecta dayana medicamento. No se siente ni se ponga de pie con rapidez, especialmente si es un paciente de edad avanzada. [place] reduce el riesgo de mareos o desmayos. El alcohol puede interferir con el efecto de dayana medicamento. Evite consumir bebidas alcohólicas.  El medicamento causará estreñimiento. Trate de evacuar los intestinos al menos cada 2 o 3 días. Si no evacua los intestinos debbie 3 días, comuníquese con valverde médico o con valverde profesional de la dimitri.  Se le podría secar la boca. Masticar chicle sin azúcar, chupar caramelos duros y sheryl agua  en abundancia lo ayudará a mantener la boca húmeda. Si el problema no desaparece o es severo, consulte a valverde médico.  ¿Qué efectos secundarios puedo tener al utilizar dayana medicamento?  Efectos secundarios que debe informar a valverde médico o a valverde profesional de la dimitri tan pronto tri sea posible:  reacciones alérgicas, tri erupción cutánea, comezón/picazón o urticarias, hinchazón de la honey, los labios o la lengua problemas respiratorios confusión enrojecimiento, formación de ampollas, descamación o distensión de la piel, incluso dentro de la boca signos y síntomas de lesión al hígado, tri orina amarilla oscura o marrón; sensación general de estar enfermo o síntomas gripales; heces claras; pérdida de apetito; náuseas; dolor en la región abdominal superior derecha; cansancio o debilidad inusual; color amarillento de los ojos o la piel signos y síntomas de presión sanguínea baja, tales tri mareos, sensación de desmayos o aturdimiento, caídas, cansancio o debilidad inusual dificultad para orinar o cambios en el volumen de orina  Efectos secundarios que generalmente no requieren atención médica (debe informarlos a valverde médico o a valverde profesional de la dimitri si persisten o si son molestos):  estreñimiento boca seca náuseas, vómito cansancio  Puede ser que esta lista no menciona todos los posibles efectos secundarios. Comuníquese a valverde médico por asesoramiento médico sobre los efectos secundarios. Usted puede informar los efectos secundarios a la FDA por teléfono al 1-800-FDA-1088.  ¿Dónde geri guardar mi medicina?  Mantenga fuera del alcance de los niños. Existe la posibilidad de abusar de dayana medicamento. Mantenga valverde medicamento en un lugar seguro para protegerlo contra robos. No comparta dayana medicamento con nadie. Es peligroso  o regalar dayana medicamento, y está prohibido por la ivan.  Guarde a temperatura ambiente, entre 20 y 25 grados Celsius (68 y 77 grados Fahrenheit).  Daynaa medicamento puede causar daños y la  muerte si lo milton otros adultos, niños o mascotas. Regrese el medicamento que no haya utilizado a un lugar oficial para desecharlo. Contacte a la FORREST al [phone] o al gobierno de valverde ciudad/condado para encontrar un lugar. Si no puede devolver el medicamento, arrójelo por el sanitario. No utilice dayana medicamento después de la fecha de vencimiento.  ATENCIÓN: Dayana folleto es un resumen. Puede ser que no cubra toda la posible información. Si usted tiene preguntas acerca de esta medicina, consulte con valverde médico, valverde farmacéutico o valverde profesional de la dimitri.  © 2020 Elsevier/Gold Standard (2018-07-26 00:00:00)    Tizanidine tablets or capsules  ¿Qué es dayana medicamento?  La TIZANIDINA ayuda a aliviar los espasmos musculares. Se utiliza también en el tratamiento de esclerosis múltiple o lesiones en la columna vertebral.  Dayana medicamento puede ser utilizado para otros usos; si tiene alguna pregunta consulte con valverde proveedor de atención médica o con valverde farmacéutico.  MARCAS COMUNES: Zanaflex  ¿Qué le geri informar a mi profesional de la dimitri antes de sheryl dayana medicamento?  Necesita saber si usted presenta alguno de los siguientes problemas o situaciones:  · enfermedad renal  · enfermedad hepática  · baja presión sanguínea  · trastorno mental  · chey reacción alérgica o inusual a la tizanidina, a otros medicamentos, a la lactosa (tabletas solamente), alimentos, colorantes o conservantes  · si está embarazada o buscando quedar embarazada  · si está amamantando a un bebé  ¿Cómo geri utilizar dayana medicamento?  [place] dayana medicamento por vía oral con un vaso lleno de agua. [place] dayana medicamento con el estómago vacío, por lo menos 30 minutos antes o 2 horas después de la comida. No lo tome con alimentos sin consultar a valverde médico. Siga las instrucciones de la etiqueta del medicamento. [place] estrellita dosis a intervalos regulares. No tome valverde medicamento con chey frecuencia mayor a la indicada. No deje de tomarlo excepto si así  lo indica valverde médico. Es peligroso suspender valverde medicamento de manera abrupta.  Hable con valverde pediatra para informarse acerca del uso de dayana medicamento en niños. Puede requerir atención especial.  Los pacientes de más de 65 años de edad pueden presentar reacciones más radha y necesitar dosis menores.  Sobredosis: Póngase en contacto inmediatamente con un centro toxicológico o chey yue de urgencia si usted tammie que haya tomado demasiado medicamento.  ATENCIÓN: Dayana medicamento es solo para usted. No comparta dayana medicamento con nadie.  ¿Qué sucede si me olvido de chey dosis?  Si olvida chey dosis, tómela lo antes posible. Si es jaida la hora de la próxima dosis, tome sólo leif dosis. No tome dosis adicionales o dobles.  ¿Qué puede interactuar con dayana medicamento?  No tome dayana medicamento con ninguno de los siguientes fármacos:  ciprofloxacino fluvoxamina medicamentos narcóticos para la tos tiabendazol  Esta medicina también puede interactuar con los siguientes medicamentos:  aciclovir alcohol antihistamínicos para alergia, tos y resfrío baclofeno ciertos medicamentos para la ansiedad o para dormir ciertos medicamentos para la presión sanguínea, enfermedad cardiaca y ritmo cardiaco irregular ciertos medicamentos para la depresión, tri amitriptilina, fluoxetina, sertralina ciertos medicamentos para convulsiones, tales tri fenobarbital, primidona ciertos medicamentos para problemas estomacales, tales tri cimetidina, famotidina hormonas femeninas, tri estrógenos o progestinas, y píldoras, parches, anillos o inyecciones anticonceptivos anestésicos generales, tales tri halotano, isoflurano, metoxiflurano, propofol anestésicos locales, tales tri lidocaína, pramoxina, tetracaína medicamentos para relajar los músculos antes de chey cirugía medicamentos narcóticos para el dolor fenotiazinas, tales tri clorpromazina, mesoridazina, proclorperazina ticlopidina zileutón  Puede ser que esta lista no menciona todas las  posibles interacciones. Informe a valverde profesional de la dimitri de todos los productos a base de hierbas, medicamentos de venta neris o suplementos nutritivos que esté tomando. Si usted fuma, consume bebidas alcohólicas o si utiliza drogas ilegales, indíqueselo también a valverde profesional de la dimitri. Algunas sustancias pueden interactuar con valverde medicamento.  ¿A qué geri estar atento al usar dayana medicamento?  Informe a valverde médico o a valverde profesional de la dimitri si estrellita síntomas no comienzan a mejorar o si empeoran.  Puede experimentar somnolencia o mareos. No conduzca, no utilice maquinaria ni jasper nada que le exija permanecer en estado de alerta hasta que sepa cómo le afecta dayana medicamento. No se siente ni se ponga de pie con rapidez, especialmente si es un paciente de edad avanzada. [place] reduce el riesgo de mareos o desmayos. El alcohol puede interferir con el efecto de dayana medicamento. Evite consumir bebidas alcohólicas.  Si está tomando otro medicamento que también causa somnolencia, es posible que tenga más efectos secundarios. Entréguele a valverde proveedor de atención médica chey lista de todos los medicamentos que usa. Valverde médico le dirá cuánto medicamento sheryl. No tome más medicamento que lo indicado. Llame al servicio de emergencias para recibir ayuda si tiene problemas para respirar o somnolencia inusual.  Se le podría secar la boca. Masticar chicle sin azúcar, chupar caramelos duros y sheryl agua en abundancia lo ayudará a mantener la boca húmeda. Si el problema no desaparece o es severo, consulte a valverde médico.  ¿Qué efectos secundarios puedo tener al utilizar dayana medicamento?  Efectos secundarios que debe informar a valverde médico o a valverde profesional de la dimitri tan pronto tri sea posible:  reacciones alérgicas, tri erupción cutánea, comezón/picazón o urticarias, hinchazón de la honey, los labios o la lengua problemas respiratorios alucinaciones signos y síntomas de lesión al hígado, tri orina amarilla oscura o marrón;  sensación general de estar enfermo o síntomas gripales; heces claras; pérdida de apetito; náuseas; dolor en la región abdominal superior derecha; cansancio o debilidad inusual; color amarillento de los ojos o la piel signos y síntomas de presión sanguínea baja, tales tri mareos, sensación de desmayos o aturdimiento, caídas, cansancio o debilidad inusual pulso cardiaco lento, inusual cansancio o debilidad inusual  Efectos secundarios que generalmente no requieren atención médica (debe informarlos a valverde médico o a valverde profesional de la dimitri si persisten o si son molestos):  visión borrosa estreñimiento mareos boca seca cansancio  Puede ser que esta lista no menciona todos los posibles efectos secundarios. Comuníquese a valverde médico por asesoramiento médico sobre los efectos secundarios. Usted puede informar los efectos secundarios a la FDA por teléfono al [phone]-[phone].  ¿Dónde geri guardar mi medicina?  Manténgala fuera del alcance de los niños.  Guárdela a temperatura ambiente, entre 15 y 30 grados C (59 y 86 grados F). Deseche los medicamentos que no haya utilizado, después de la fecha de vencimiento.  ATENCIÓN: [place] folleto es un resumen. Puede ser que no cubra toda la posible información. Si usted tiene preguntas acerca de esta medicina, consulte con valverde médico, valverde farmacéutico o valverde profesional de la dimitri.  © 2020 Elsevier/Gold Standard (2018-11-26 00:00:00)    · Is patient discharged on Warfarin / Coumadin?   No     Depression / Suicide Risk    As you are discharged from this [facility] facility, it is important to learn how to keep safe from harming yourself.    Recognize the warning signs:  · Abrupt changes in personality, positive or negative- including increase in energy   · Giving away possessions  · Change in eating patterns- significant weight changes-  positive or negative  · Change in sleeping patterns- unable to sleep or sleeping all the time   · Unwillingness or inability to  communicate  · Depression  · Unusual sadness, discouragement and loneliness  · Talk of wanting to die  · Neglect of personal appearance   · Rebelliousness- reckless behavior  · Withdrawal from people/activities they love  · Confusion- inability to concentrate     If you or a loved one observes any of these behaviors or has concerns about self-harm, here's what you can do:  · Talk about it- your feelings and reasons for harming yourself  · Remove any means that you might use to hurt yourself (examples: pills, rope, extension cords, firearm)  · Get professional help from the community (Mental Health, Substance Abuse, psychological counseling)  · Do not be alone:Call your Safe Contact- someone whom you trust who will be there for you.  · Call your local CRISIS HOTLINE 858-9676 or 366-124-7669  · Call your local Children's Mobile Crisis Response Team [place] (011) 751-0568 or www.[employer]  · Call the toll free National Suicide Prevention Hotlines   · National Suicide Prevention Lifeline 150-586-BDHT (8017)  · National Hope Line Network 800-SUICIDE (251-4238)

## 2022-04-29 NOTE — PROGRESS NOTES
"    Date: 4/29/2022        Anticipated Discharge Disposition: DC home with home care accepted BY [facility]      Action: CM et with team for MDR.  DC home with family.  Home care arranged, and DME provided.        All agreeable and verbalize understanding of dc plan.       Cherri Ross, RN, BSN, CM  Case Management  \"[facility] [employer]\"  E-mail: [email or web address]  Phone: [phone]    "

## 2022-04-29 NOTE — PROGRESS NOTES
Neurosurgery Progress Note    Subjective:  No acute events over night  Right leg pain improved, some persistent pain in LLE      Exam:  A&O Liberian speaking  TADEO with FS  Incision CDi with dermabond      BP  Min: 124/63  Max: 151/71  Pulse  Av.2  Min: 51  Max: 69  Resp  Av.2  Min: 16  Max: 17  Temp  Av.4 °C (97.5 °F)  Min: 36.1 °C (96.9 °F)  Max: 36.9 °C (98.4 °F)  SpO2  Av.4 %  Min: 92 %  Max: 95 %    No data recorded    Recent Labs     22  1026   WBC 9.8   RBC 3.76*   HEMOGLOBIN 11.1*   HEMATOCRIT 34.8*   MCV 92.6   MCH 29.5   MCHC 31.9*   RDW 46.2   PLATELETCT 227   MPV 9.7     Recent Labs     22  1026   SODIUM 140   POTASSIUM 4.3   CHLORIDE 105   CO2 28   GLUCOSE 108*   BUN 14   CREATININE 0.73   CALCIUM 8.5               Intake/Output                       22 0700 - 22 0659 22 0700 - 22 0659     7470-1716 0602-7306 Total 7438-2125 4885-0036 Total                 Intake    P.O.  840  -- 840  --  -- --    P.O. 840 -- 840 -- -- --    Total Intake 840 -- 840 -- -- --       Output    Urine  --  -- --  --  -- --    Number of Times Voided 1 x 2 x 3 x -- -- --    Total Output -- -- -- -- -- --       Net I/O     840 -- 840 -- -- --            Intake/Output Summary (Last 24 hours) at 2022 0833  Last data filed at 2022 1400  Gross per 24 hour   Intake 840 ml   Output --   Net 840 ml            • atorvastatin  20 mg Q EVENING   • gabapentin  300 mg TID   • metoprolol SR  25 mg QDAY   • Pharmacy Consult Request  1 Each PHARMACY TO DOSE   • MD CARRANZA...DO NOT ADMINISTER NSAIDS or ASPIRIN unless ORDERED By Neurosurgery  1 Each PRN   • docusate sodium  100 mg BID   • senna-docusate  1 Tablet Nightly   • senna-docusate  1 Tablet Q24HRS PRN   • polyethylene glycol/lytes  1 Packet BID PRN   • magnesium hydroxide  30 mL QDAY PRN   • bisacodyl  10 mg Q24HRS PRN   • sodium phosphate  1 Each Once PRN   • acetaminophen  1,000 mg Q6HRS    Followed by   • [START ON  5/1/2022] acetaminophen  1,000 mg Q6HRS PRN   • oxyCODONE immediate-release  5 mg Q3HRS PRN    Or   • oxyCODONE immediate-release  10 mg Q3HRS PRN    Or   • HYDROmorphone  0.5 mg Q3HRS PRN   • diphenhydrAMINE  25 mg Q6HRS PRN    Or   • diphenhydrAMINE  25 mg Q6HRS PRN   • ondansetron  4 mg Q4HRS PRN   • ondansetron  4 mg Q4HRS PRN   • methocarbamol  750 mg Q8HRS PRN    Or   • cyclobenzaprine  10 mg Q8HRS PRN    Or   • diazePAM  5 mg Q4HRS PRN   • labetalol  10 mg Q HOUR PRN   • benzocaine-menthol  1 Lozenge Q2HRS PRN   • Respiratory Therapy Consult   Continuous RT   • calcium carbonate  500 mg BID   • omeprazole  20 mg DAILY       Assessment and Plan:  Hospital day #4  POD #3 L4-5 laminectomy  Prophylactic anticoagulation: no         Start date/time: tbd    Neuro exam stable  Off of O2  CTA negative  PT/OT   Recommending home health   4/29 has been accepted    Rx meds to beds  Home today  Discussed with Dr. Luu

## 2022-04-29 NOTE — CARE PLAN
The patient is Stable - Low risk of patient condition declining or worsening    Shift Goals  Clinical Goals: pain managmement, safety, O2 sats  Patient Goals: rest, discharge home    Progress made toward(s) clinical / shift goals:      Problem: Fall Risk  Goal: Patient will remain free from falls  4/29/2022 0251 by Rebecca Mckeon R.N.  Outcome: Progressing  Safety precautions are in place including bed locked and in lowest position, upper bed rails up, bed alarm on, call light within reach, treaded socks on, tray table and personal belongings within reach.     Problem: Respiratory  Goal: Patient will achieve/maintain optimum respiratory ventilation and gas exchange  Outcome: Progressing   Pt O2 saturation level remains at 92-93% with 0.5 LPM NC. O2 Saturation level drops to 86-85% while sleeping while on room air. Pt saturation levels reamin @ 89-90% on room air while up and awake.    Patient is not progressing towards the following goals:

## 2022-04-29 NOTE — PROGRESS NOTES
Patient discharged home. Patient AOX 4.  IV removed, discharge instructions provided to patient and reviewed with them. All questions answered, patient provided copy of discharge instructions and instructed on when to F/U with MD. Patient walked off unit. Patient discharged into the care of her daughter.

## 2022-05-06 ENCOUNTER — HOSPITAL ENCOUNTER (EMERGENCY)
Facility: MEDICAL CENTER | Age: 67
End: 2022-05-06
Attending: EMERGENCY MEDICINE
Payer: COMMERCIAL

## 2022-05-06 ENCOUNTER — OFFICE VISIT (OUTPATIENT)
Dept: URGENT CARE | Facility: CLINIC | Age: 67
End: 2022-05-06
Payer: COMMERCIAL

## 2022-05-06 VITALS
OXYGEN SATURATION: 97 % | HEIGHT: 63 IN | HEART RATE: 67 BPM | SYSTOLIC BLOOD PRESSURE: 124 MMHG | TEMPERATURE: 97.8 F | DIASTOLIC BLOOD PRESSURE: 62 MMHG | WEIGHT: 191 LBS | BODY MASS INDEX: 33.84 KG/M2 | RESPIRATION RATE: 16 BRPM

## 2022-05-06 VITALS
TEMPERATURE: 98.7 F | RESPIRATION RATE: 16 BRPM | HEART RATE: 72 BPM | OXYGEN SATURATION: 96 % | SYSTOLIC BLOOD PRESSURE: 137 MMHG | BODY MASS INDEX: 34.14 KG/M2 | HEIGHT: 63 IN | DIASTOLIC BLOOD PRESSURE: 63 MMHG | WEIGHT: 192.68 LBS

## 2022-05-06 DIAGNOSIS — R10.9 ABDOMINAL PAIN, UNSPECIFIED ABDOMINAL LOCATION: ICD-10-CM

## 2022-05-06 DIAGNOSIS — R19.7 DIARRHEA, UNSPECIFIED TYPE: ICD-10-CM

## 2022-05-06 DIAGNOSIS — E86.0 DEHYDRATION: ICD-10-CM

## 2022-05-06 DIAGNOSIS — T88.7XXA MEDICATION SIDE EFFECT: ICD-10-CM

## 2022-05-06 LAB
ALBUMIN SERPL BCP-MCNC: 3.9 G/DL (ref 3.2–4.9)
ALBUMIN/GLOB SERPL: 1.1 G/DL
ALP SERPL-CCNC: 148 U/L (ref 30–99)
ALT SERPL-CCNC: 16 U/L (ref 2–50)
ANION GAP SERPL CALC-SCNC: 9 MMOL/L (ref 7–16)
APPEARANCE UR: CLEAR
AST SERPL-CCNC: 22 U/L (ref 12–45)
BACTERIA #/AREA URNS HPF: NEGATIVE /HPF
BASOPHILS # BLD AUTO: 0.5 % (ref 0–1.8)
BASOPHILS # BLD: 0.06 K/UL (ref 0–0.12)
BILIRUB SERPL-MCNC: 0.5 MG/DL (ref 0.1–1.5)
BILIRUB UR QL STRIP.AUTO: NEGATIVE
BUN SERPL-MCNC: 7 MG/DL (ref 8–22)
CALCIUM SERPL-MCNC: 9.2 MG/DL (ref 8.5–10.5)
CHLORIDE SERPL-SCNC: 100 MMOL/L (ref 96–112)
CO2 SERPL-SCNC: 25 MMOL/L (ref 20–33)
COLOR UR: YELLOW
CREAT SERPL-MCNC: 0.79 MG/DL (ref 0.5–1.4)
EKG IMPRESSION: NORMAL
EOSINOPHIL # BLD AUTO: 0.29 K/UL (ref 0–0.51)
EOSINOPHIL NFR BLD: 2.3 % (ref 0–6.9)
EPI CELLS #/AREA URNS HPF: NORMAL /HPF
ERYTHROCYTE [DISTWIDTH] IN BLOOD BY AUTOMATED COUNT: 43.6 FL (ref 35.9–50)
GFR SERPLBLD CREATININE-BSD FMLA CKD-EPI: 83 ML/MIN/1.73 M 2
GLOBULIN SER CALC-MCNC: 3.4 G/DL (ref 1.9–3.5)
GLUCOSE SERPL-MCNC: 105 MG/DL (ref 65–99)
GLUCOSE UR STRIP.AUTO-MCNC: NEGATIVE MG/DL
HCT VFR BLD AUTO: 39 % (ref 37–47)
HGB BLD-MCNC: 12.7 G/DL (ref 12–16)
HYALINE CASTS #/AREA URNS LPF: NORMAL /LPF
IMM GRANULOCYTES # BLD AUTO: 0.07 K/UL (ref 0–0.11)
IMM GRANULOCYTES NFR BLD AUTO: 0.6 % (ref 0–0.9)
KETONES UR STRIP.AUTO-MCNC: NEGATIVE MG/DL
LEUKOCYTE ESTERASE UR QL STRIP.AUTO: ABNORMAL
LIPASE SERPL-CCNC: 33 U/L (ref 11–82)
LYMPHOCYTES # BLD AUTO: 2.27 K/UL (ref 1–4.8)
LYMPHOCYTES NFR BLD: 17.9 % (ref 22–41)
MCH RBC QN AUTO: 29.8 PG (ref 27–33)
MCHC RBC AUTO-ENTMCNC: 32.6 G/DL (ref 33.6–35)
MCV RBC AUTO: 91.5 FL (ref 81.4–97.8)
MICRO URNS: ABNORMAL
MONOCYTES # BLD AUTO: 1.02 K/UL (ref 0–0.85)
MONOCYTES NFR BLD AUTO: 8.1 % (ref 0–13.4)
NEUTROPHILS # BLD AUTO: 8.94 K/UL (ref 2–7.15)
NEUTROPHILS NFR BLD: 70.6 % (ref 44–72)
NITRITE UR QL STRIP.AUTO: NEGATIVE
NRBC # BLD AUTO: 0 K/UL
NRBC BLD-RTO: 0 /100 WBC
PH UR STRIP.AUTO: 7.5 [PH] (ref 5–8)
PLATELET # BLD AUTO: 327 K/UL (ref 164–446)
PMV BLD AUTO: 9.4 FL (ref 9–12.9)
POTASSIUM SERPL-SCNC: 4.6 MMOL/L (ref 3.6–5.5)
PROT SERPL-MCNC: 7.3 G/DL (ref 6–8.2)
PROT UR QL STRIP: NEGATIVE MG/DL
RBC # BLD AUTO: 4.26 M/UL (ref 4.2–5.4)
RBC # URNS HPF: NORMAL /HPF
RBC UR QL AUTO: ABNORMAL
SODIUM SERPL-SCNC: 134 MMOL/L (ref 135–145)
SP GR UR STRIP.AUTO: 1.01
TROPONIN T SERPL-MCNC: 10 NG/L (ref 6–19)
UROBILINOGEN UR STRIP.AUTO-MCNC: 0.2 MG/DL
WBC # BLD AUTO: 12.7 K/UL (ref 4.8–10.8)
WBC #/AREA URNS HPF: NORMAL /HPF

## 2022-05-06 PROCEDURE — A9270 NON-COVERED ITEM OR SERVICE: HCPCS | Performed by: EMERGENCY MEDICINE

## 2022-05-06 PROCEDURE — 80053 COMPREHEN METABOLIC PANEL: CPT

## 2022-05-06 PROCEDURE — 36415 COLL VENOUS BLD VENIPUNCTURE: CPT

## 2022-05-06 PROCEDURE — 96374 THER/PROPH/DIAG INJ IV PUSH: CPT

## 2022-05-06 PROCEDURE — 83690 ASSAY OF LIPASE: CPT

## 2022-05-06 PROCEDURE — 99284 EMERGENCY DEPT VISIT MOD MDM: CPT

## 2022-05-06 PROCEDURE — 700111 HCHG RX REV CODE 636 W/ 250 OVERRIDE (IP): Performed by: EMERGENCY MEDICINE

## 2022-05-06 PROCEDURE — 84484 ASSAY OF TROPONIN QUANT: CPT

## 2022-05-06 PROCEDURE — 99214 OFFICE O/P EST MOD 30 MIN: CPT | Performed by: NURSE PRACTITIONER

## 2022-05-06 PROCEDURE — 700102 HCHG RX REV CODE 250 W/ 637 OVERRIDE(OP): Performed by: EMERGENCY MEDICINE

## 2022-05-06 PROCEDURE — 85025 COMPLETE CBC W/AUTO DIFF WBC: CPT

## 2022-05-06 PROCEDURE — 700105 HCHG RX REV CODE 258: Performed by: EMERGENCY MEDICINE

## 2022-05-06 PROCEDURE — 93005 ELECTROCARDIOGRAM TRACING: CPT | Performed by: EMERGENCY MEDICINE

## 2022-05-06 PROCEDURE — 81001 URINALYSIS AUTO W/SCOPE: CPT

## 2022-05-06 RX ORDER — ACETAMINOPHEN 325 MG/1
650 TABLET ORAL ONCE
Status: COMPLETED | OUTPATIENT
Start: 2022-05-06 | End: 2022-05-06

## 2022-05-06 RX ORDER — ONDANSETRON 4 MG/1
4 TABLET, ORALLY DISINTEGRATING ORAL EVERY 6 HOURS PRN
Qty: 10 TABLET | Refills: 0 | Status: SHIPPED | OUTPATIENT
Start: 2022-05-06 | End: 2022-05-19

## 2022-05-06 RX ORDER — LOPERAMIDE HYDROCHLORIDE 2 MG/1
2 CAPSULE ORAL 4 TIMES DAILY PRN
Qty: 30 CAPSULE | Refills: 0 | Status: SHIPPED | OUTPATIENT
Start: 2022-05-06 | End: 2022-06-28

## 2022-05-06 RX ORDER — OXYCODONE HYDROCHLORIDE 5 MG/1
5 TABLET ORAL EVERY 4 HOURS PRN
Status: SHIPPED | COMMUNITY
End: 2022-05-19

## 2022-05-06 RX ORDER — OXYCODONE HYDROCHLORIDE 5 MG/1
5 TABLET ORAL ONCE
Status: COMPLETED | OUTPATIENT
Start: 2022-05-06 | End: 2022-05-06

## 2022-05-06 RX ORDER — ONDANSETRON 2 MG/ML
4 INJECTION INTRAMUSCULAR; INTRAVENOUS ONCE
Status: COMPLETED | OUTPATIENT
Start: 2022-05-06 | End: 2022-05-06

## 2022-05-06 RX ORDER — SODIUM CHLORIDE, SODIUM LACTATE, POTASSIUM CHLORIDE, CALCIUM CHLORIDE 600; 310; 30; 20 MG/100ML; MG/100ML; MG/100ML; MG/100ML
1000 INJECTION, SOLUTION INTRAVENOUS ONCE
Status: COMPLETED | OUTPATIENT
Start: 2022-05-06 | End: 2022-05-06

## 2022-05-06 RX ADMIN — ONDANSETRON 4 MG: 2 INJECTION INTRAMUSCULAR; INTRAVENOUS at 13:01

## 2022-05-06 RX ADMIN — ACETAMINOPHEN 650 MG: 325 TABLET, FILM COATED ORAL at 13:02

## 2022-05-06 RX ADMIN — SODIUM CHLORIDE, POTASSIUM CHLORIDE, SODIUM LACTATE AND CALCIUM CHLORIDE 1000 ML: 600; 310; 30; 20 INJECTION, SOLUTION INTRAVENOUS at 13:02

## 2022-05-06 RX ADMIN — OXYCODONE 5 MG: 5 TABLET ORAL at 14:33

## 2022-05-06 ASSESSMENT — PAIN DESCRIPTION - PAIN TYPE: TYPE: ACUTE PAIN

## 2022-05-06 NOTE — ED PROVIDER NOTES
ED Provider Note    ED Provider Note    Primary care provider: John Lockett M.D.  Means of arrival: Walk-in  History obtained from: Patient    CHIEF COMPLAINT  Chief Complaint   Patient presents with   • Post-Op Complications     Discharged last Friday after back surgery on 4/26.  Received oxycodone and tizanidine, that causes her stomach discomfort and n/v/d after each.  Pt taking both meds q 6 hours due to pain   • Nausea/Vomiting/Diarrhea     Vomited 1 time today, 6 loose, watery BMS today.     • Chills     Cold sweats nightly since surgery     Seen at 12:13 PM.   HPI  Nila Villatoro is a 66 y.o. female who presents to the Emergency Department abdominal pain, vomiting, diarrhea.  The patient is approximate 1 week status post L5 foraminotomy.  She is doing well from a surgical standpoint.  She has been walking more with decreasing amount of pain in the lower back.  She denies any new numbness or weakness.  She did note some swelling over the surgical site that began over the past day.    The patient has been checking her temperature regularly and has not had any fevers.  She denies any headache, chest pain, shortness of breath, leg pain or leg swelling.  She currently notes a 4 out of 10 left upper quadrant abdominal pain without modifying factors.  This seems to start after she takes the tizanidine and oxycodone which she takes every 6 hours at the same time.  She has never been on these medications in the past.    Her pain now has eased up from when it was more severe, just after taking the medications at 8 AM this morning.  She also began having copious amount of watery diarrhea today, 8 episodes prior to arrival.  She had 1 episode of vomiting in addition to this.  No recent antibiotics, no sick contacts.    REVIEW OF SYSTEMS  See HPI,   Remainder of ROS negative.     PAST MEDICAL HISTORY   has a past medical history of Gastritis, High cholesterol, Hyperlipidemia, Hypertension, Pain, and  "PONV (postoperative nausea and vomiting).    SURGICAL HISTORY   has a past surgical history that includes breast biopsy; us-needle core bx-breast panel (Left); cholecystectomy (2009); primary c section; lumbar laminectomy diskectomy (4/26/2022); and foraminotomy (4/26/2022).    SOCIAL HISTORY  Social History     Tobacco Use   • Smoking status: Never Smoker   • Smokeless tobacco: Never Used   Vaping Use   • Vaping Use: Never used   Substance Use Topics   • Alcohol use: Not Currently   • Drug use: No      Social History     Substance and Sexual Activity   Drug Use No       FAMILY HISTORY  Family History   Problem Relation Age of Onset   • Cancer Mother         Cervical Cancer   • Heart Attack Father        CURRENT MEDICATIONS  Reviewed.  See Encounter Summary.     ALLERGIES  Allergies   Allergen Reactions   • Penicillins Hives     4/2022: tolerated cefazolin       PHYSICAL EXAM  VITAL SIGNS: /81   Pulse 80   Temp 37.1 °C (98.7 °F)   Resp 15   Ht 1.6 m (5' 3\")   Wt 87.4 kg (192 lb 10.9 oz)   LMP  (LMP Unknown)   SpO2 96%   BMI 34.13 kg/m²   Constitutional: Awake, alert in no apparent distress.  HENT: Normocephalic, Bilateral external ears normal. Nose normal.  Dry mucosal membranes.  Eyes: Conjunctiva normal, non-icteric, EOMI.    Thorax & Lungs: Easy unlabored respirations, Clear to ascultation bilaterally.  Cardiovascular: Regular rate, Regular rhythm, No murmurs, rubs or gallops. Bilateral pulses symmetrical.   Abdomen:  Soft, left upper quadrant abdominal tenderness without rebound or guarding, nondistended, normal active bowel sounds.   :    Skin: Visualized skin is  Dry, No erythema, No rash.   Musculoskeletal:   No cyanosis, clubbing or edema. No leg asymmetry.  Back has a midline incision over the lumbar spine that is well approximated without any signs of dehiscence, erythema or purulence.  There is some fullness noted to the right side of the surgical scar, likely seroma or hematoma, this is " nontender.  Neurologic: Alert, Grossly non-focal.   Psychiatric: Normal affect, Normal mood  Lymphatic:  No cervical LAD      RADIOLOGY  No orders to display         COURSE & MEDICAL DECISION MAKING  Pertinent Labs & Imaging studies reviewed. (See chart for details)    Differential diagnoses include but are not limited to: Viral syndrome, medication side effects, dehydration    12:13 PM - Medical record reviewed, patient status post lumbar foraminotomy for radiculopathy by Dr. Weiss 4/26.    2:21 PM: Patient feels improved after IV fluids.    Decision Making:  This is a pleasant 66 y.o. year old female who presents with 1 episode of vomiting, copious episodes of diarrhea, generalized malaise.  She also has had some left upper quadrant abdominal pain after taking the combination of tizanidine and oxycodone.  The patient has a benign abdominal examination today, she does note is improving over the course of the day which is reassuring.  She seems to get every time she takes the medications, therefore is most consistent with medication side effect.  I would like to discontinue the tizanidine as it does cause significant orthostatic hypotension and may be contributing to some of her symptoms.  Neither medication is known to cause diarrhea, therefore I suspect these 2 things are unrelated.    The patient did have dry mucosal membranes which is consistent with dehydration, also a side effect to tizanidine.  Overall the patient's back pain has been steadily improving and she is more active than usual, therefore I do not suspect any complication to the surgery itself.  There is some slight swelling to the location but not consistent with a wound infection.  I do not feel that she requires emergent MRI today as her symptoms do not appear related to the surgery itself.    The patient will be treated with Zofran and Imodium at home, recommend continue p.o. hydration.  If she develops any fevers, worsening back pain, numbness,  weakness she should return immediately to the emergency department.    Discharge Medications:  New Prescriptions    LOPERAMIDE (IMODIUM) 2 MG CAP    Take 1 Capsule by mouth 4 times a day as needed for Diarrhea.    ONDANSETRON (ZOFRAN ODT) 4 MG TABLET DISPERSIBLE    Take 1 Tablet by mouth every 6 hours as needed for Nausea.       The patient was discharged home (see d/c instructions) was told to return immediately for any signs or symptoms listed, or any worsening at all.  The patient verbally agreed to the discharge precautions and follow-up plan which is documented in EPIC.        FINAL IMPRESSION  1. Dehydration    2. Medication side effect    3. Diarrhea, unspecified type

## 2022-05-06 NOTE — PROGRESS NOTES
Chief Complaint   Patient presents with   • Dizziness     Stomach ache, nausea, recent back surgery 4/26 all the medication is making the patient sick, vomit, diarrhea       HISTORY OF PRESENT ILLNESS: Patient is a pleasant 66 y.o. female who presents to urgent care today with her daughter, both provide the history.  The patient notes she had a laminectomy performed on 4/26.  While in the hospital, the patient developed chest pain, cardiac studies resulted negative.  Nevertheless, the patient was discharged home feeling improved.  Over the past 3 days though the patient has had epigastric abdominal pain, nausea, vomiting, diarrhea, dizziness, tactile fever, chills, and sweating.  She denies any chest pain, urinary symptoms, or shortness of breath.  She is currently taking oxycodone and tizanidine for her pain.  She does feel like her back pain is improving.  She is scheduled to see her surgeon on Monday for follow-up.  Denies any known ill contacts.    Patient Active Problem List    Diagnosis Date Noted   • Acute left-sided low back pain with sciatica 07/23/2020   • Herniation of left side of L4-L5 intervertebral disc 07/23/2020   • Acute pain of right knee 06/18/2020   • Hypertension    • Cervical high risk HPV (human papillomavirus) test positive 10/29/2019   • Epigastric pain 10/01/2019   • Pelvic pain in female 06/21/2018   • Venous insufficiency 12/09/2016   • Mammographic microcalcification on right breast and biopsy on 4/1/16 ruled out malignancy  06/02/2016   • Dyslipidemia 02/10/2016   • Heart palpitations 07/02/2013   • Gastroesophageal reflux disease without esophagitis 07/02/2013       Allergies:Penicillins    Current Outpatient Medications Ordered in Epic   Medication Sig Dispense Refill   • tizanidine (ZANAFLEX) 4 MG Tab Take 1 Tablet by mouth 3 times a day for 14 days. 42 Tablet 0   • metoprolol SR (TOPROL XL) 25 MG TABLET SR 24 HR TOME BRANDY MILLY WHITFIELD (Patient taking differently: Take 25  mg by mouth every evening.) 90 Tablet 3   • gabapentin (NEURONTIN) 300 MG Cap Take 300 mg by mouth 3 times a day.     • atorvastatin (LIPITOR) 20 MG Tab TOME BRANDY TABLETA TODOS LOS WHITFIELD EN LA NOCHE (Patient not taking: Reported on 2022) 90 Tablet 0     No current Epic-ordered facility-administered medications on file.       Past Medical History:   Diagnosis Date   • Gastritis    • High cholesterol    • Hyperlipidemia    • Hypertension    • Pain     BACK PAIN   • PONV (postoperative nausea and vomiting)        Social History     Tobacco Use   • Smoking status: Never Smoker   • Smokeless tobacco: Never Used   Vaping Use   • Vaping Use: Never used   Substance Use Topics   • Alcohol use: Not Currently   • Drug use: No       Family Status   Relation Name Status   • Mo   at age 56        cervical cancer   • Fa   at age 74        Heart attack   • MGMo     • MGFa     • PGMo     • PGFa     • Sis   at age 54        Breast cancer   • Sis  Alive   • Sis  Alive   • Rosetta  Alive   • Rosetta  Alive   • Son  Alive   • Son  Alive     Family History   Problem Relation Age of Onset   • Cancer Mother         Cervical Cancer   • Heart Attack Father        ROS:  Review of Systems   Constitutional: Positive for fever, chills, malaise, and fatigue.   HENT: Negative for ear pain, nosebleeds, congestion, sore throat and neck pain.    Eyes: Negative for vision changes.   Neuro: Negative for headache, sensory changes, weakness, seizure, LOC.   Cardiovascular: Negative for chest pain, palpitations, orthopnea and leg swelling.   Respiratory: Negative for cough, sputum production, shortness of breath and wheezing.   Gastrointestinal: Positive for abdominal pain, nausea, vomiting and diarrhea.   Genitourinary: Negative for dysuria, urgency and frequency.  Musculoskeletal: Positive for back pain.  Negative for falls, neck pain, joint pain, myalgias.   Skin: Negative for rash, diaphoresis.  "    Exam:  /62 (BP Location: Left arm, Patient Position: Sitting, BP Cuff Size: Adult long)   Pulse 67   Temp 36.6 °C (97.8 °F) (Temporal)   Resp 16   Ht 1.6 m (5' 3\")   Wt 86.6 kg (191 lb)   SpO2 97%   General: well-nourished, well-developed female in NAD  Head: normocephalic, atraumatic  Eyes: PERRLA, no conjunctival injection, acuity grossly intact, lids normal.  Ears: normal shape and symmetry, no tenderness, no discharge. External canals are without any significant edema or erythema. Tympanic membranes are without any inflammation, no effusion. Gross auditory acuity is intact.  Nose: symmetrical without tenderness, no discharge.  Mouth/Throat: reasonable hygiene, no erythema, exudates or tonsillar enlargement.  Neck: no masses, range of motion within normal limits, no tracheal deviation. No obvious thyroid enlargement.   Lymph: no cervical adenopathy. No supraclavicular adenopathy.   Neuro: alert and oriented. Cranial nerves 1-12 grossly intact. No sensory deficit.   Cardiovascular: regular rate and rhythm. No edema.  Pulmonary: no distress. Chest is symmetrical with respiration, no wheezes, crackles, or rhonchi.   Abdomen: soft, epigastric and left-sided abdominal tenderness present, no guarding, no hepatosplenomegaly.  Musculoskeletal: no clubbing, appropriate muscle tone, gait is stable.  There is a an incision to lumbar spine which appears to be healing appropriately, small amount of surrounding swelling noted.  Skin: Pale, warm, dry, intact, no clubbing, no cyanosis, no rashes.   Psych: appropriate mood, affect, judgement.         Assessment/Plan:  1. Abdominal pain, unspecified abdominal location           Patient is a pleasant 66-year-old female who presents with GI symptoms over the past 3 days.  The patient is pale appearing in office with left lateral and epigastric tenderness.  Differential diagnoses include but are not limited to: Acute viral gastroenteritis, influenza, diverticulitis, " gastritis, surgical adverse outcome.  At this time, I feel the patient requires a higher level of care in the ED for closer monitoring, stat lab work and/or imaging for further evaluation. This has been discussed with the patient and she states agreement and understanding. The patient is stable to leave POV at this time and will go directly to ED without delay.         Please note that this dictation was created using voice recognition software. I have made every reasonable attempt to correct obvious errors, but I expect that there are errors of grammar and possibly content that I did not discover before finalizing the note.      DUTCH Rose.

## 2022-05-06 NOTE — ED NOTES
Discharge instructions reviewed with the pt, all questions asked and answered.      at bedside.    IV removed before discharge home. Pt voices all understanding of follow up and home medications.

## 2022-05-06 NOTE — ED TRIAGE NOTES
Chief Complaint   Patient presents with   • Post-Op Complications     Discharged last Friday after back surgery on 4/26.  Received oxycodone and tizanidine, that causes her stomach discomfort and n/v/d after each.  Pt taking both meds q 6 hours due to pain   • Nausea/Vomiting/Diarrhea     Vomited 1 time today, 6 loose, watery BMS today.     • Chills     Cold sweats nightly since surgery     Pt ambulating with steady gait.  Surgical incison healing appropriately.  Daughter at bedside, pt prefers her as .  Abdominal pain protocol initiated.

## 2022-05-19 ENCOUNTER — OFFICE VISIT (OUTPATIENT)
Dept: MEDICAL GROUP | Facility: PHYSICIAN GROUP | Age: 67
End: 2022-05-19
Payer: COMMERCIAL

## 2022-05-19 VITALS
WEIGHT: 193 LBS | OXYGEN SATURATION: 93 % | DIASTOLIC BLOOD PRESSURE: 82 MMHG | BODY MASS INDEX: 34.2 KG/M2 | HEIGHT: 63 IN | TEMPERATURE: 97.9 F | HEART RATE: 71 BPM | SYSTOLIC BLOOD PRESSURE: 138 MMHG

## 2022-05-19 DIAGNOSIS — R06.02 SOB (SHORTNESS OF BREATH): ICD-10-CM

## 2022-05-19 DIAGNOSIS — M51.26 HERNIATION OF LEFT SIDE OF L4-L5 INTERVERTEBRAL DISC: ICD-10-CM

## 2022-05-19 PROCEDURE — 99214 OFFICE O/P EST MOD 30 MIN: CPT | Performed by: FAMILY MEDICINE

## 2022-05-19 RX ORDER — GABAPENTIN 600 MG/1
TABLET ORAL
COMMUNITY
Start: 2022-05-09 | End: 2022-08-12

## 2022-05-19 RX ORDER — PANTOPRAZOLE SODIUM 40 MG/1
40 TABLET, DELAYED RELEASE ORAL DAILY
Qty: 30 TABLET | Refills: 3 | Status: SHIPPED | OUTPATIENT
Start: 2022-05-19 | End: 2022-05-24 | Stop reason: SDUPTHER

## 2022-05-19 RX ORDER — DOCUSATE SODIUM 100 MG/1
CAPSULE, LIQUID FILLED ORAL
COMMUNITY
Start: 2022-05-09 | End: 2022-06-28

## 2022-05-19 RX ORDER — DEXAMETHASONE 4 MG/1
1 TABLET ORAL 2 TIMES DAILY
Qty: 1 EACH | Refills: 3 | Status: SHIPPED | OUTPATIENT
Start: 2022-05-19 | End: 2022-08-12

## 2022-05-19 ASSESSMENT — FIBROSIS 4 INDEX: FIB4 SCORE: 1.09

## 2022-05-19 NOTE — PROGRESS NOTES
Subjective:     Chief Complaint   Patient presents with   • Medication Management     Nuasea due to man medications   • Breathing Problem     Collapsed lung, pain mainly coming from the throat       HPI:   Nila presents today to discuss the following.    Herniation of left side of L4-L5 intervertebral disc  Chronic issue  S/p laminectomy 4/26  To FU with neurosurgery 6/22    SOB (shortness of breath)  Chronic issue  Since covid 12/20 she has had persistent SOB  Pulse ox is normal        Past Medical History:   Diagnosis Date   • Gastritis    • High cholesterol    • Hyperlipidemia    • Hypertension    • Pain     BACK PAIN   • PONV (postoperative nausea and vomiting)        Current Outpatient Medications Ordered in Epic   Medication Sig Dispense Refill   • gabapentin (NEURONTIN) 600 MG tablet TOME BRANDY TABLETA JAYJAY VECES AL D A SEG N LO INDICADO FOR 30 DAYS     • fluticasone (FLOVENT HFA) 110 MCG/ACT Aerosol Inhale 1 Puff 2 times a day. 1 Each 3   • pantoprazole (PROTONIX) 40 MG Tablet Delayed Response Take 1 Tablet by mouth every day. 30 Tablet 3   • loperamide (IMODIUM) 2 MG Cap Take 1 Capsule by mouth 4 times a day as needed for Diarrhea. 30 Capsule 0   • atorvastatin (LIPITOR) 20 MG Tab TOME BRANDY TABLETA TODOS LOS WHITFIELD EN LA NOCHE 90 Tablet 0   • metoprolol SR (TOPROL XL) 25 MG TABLET SR 24 HR TOME BRANDY TABLETA TODOS LOS WHITFIELD (Patient taking differently: Take 25 mg by mouth every evening.) 90 Tablet 3   • docusate sodium (COLACE) 100 MG Cap TOME BRANDY C PSULA TODOS LOS D AS CUANDO SEA NECESARIO POR 10 D AS       No current Epic-ordered facility-administered medications on file.       Allergies:  Penicillins    Health Maintenance: Completed    ROS:  Gen: no fevers/chills, no changes in weight  Eyes: no changes in vision  Pulm: no sob, no cough  CV: no chest pain, no palpitations  GI: no nausea/vomiting, no diarrhea      Objective:     Exam:  /82 (BP Location: Left arm, Patient Position: Sitting, BP Cuff Size:  "Adult)   Pulse 71   Temp 36.6 °C (97.9 °F) (Temporal)   Ht 1.6 m (5' 3\")   Wt 87.5 kg (193 lb)   LMP  (LMP Unknown)   SpO2 93%   BMI 34.19 kg/m²  Body mass index is 34.19 kg/m².    Gen: Alert and oriented, No apparent distress.  HENT: TMs are clear. Oropharynx is w/o erythema or exudates. Neck is supple without cervical lymphadenopathy. No JVD.   Eyes: PERRLA  Lungs: Normal effort, CTA bilaterally, no wheezes, rhonchi, or rales  CV: Regular rate and rhythm. No murmurs, rubs, or gallops.  Abdomen: Soft, nontender, nondistended. Normal bowel sounds. Liver and spleen are not palpable  Ext: No clubbing, cyanosis, edema.  Skin: no rash, lesions or ulcers  Neuro: Moves all extremities.  Psych: AAOx3      Assessment & Plan:     66 y.o. female with the following -     1. Herniation of left side of L4-L5 intervertebral disc  Chronic condition.  Status postlaminectomy.  Pain is tolerable at this time.  I recommend continuing to follow-up with neurosurgery.    2. SOB (shortness of breath)  Chronic condition.  Unchanged.  Unknown etiology.  We will proceed with x-rays of the chest.  Reactive airway disease is a possibility.  Thus I will do a trial of ICS.  - DX-CHEST-2 VIEWS; Future  - fluticasone (FLOVENT HFA) 110 MCG/ACT Aerosol; Inhale 1 Puff 2 times a day.  Dispense: 1 Each; Refill: 3      Return in about 6 weeks (around 6/30/2022).    Please note that this dictation was created using voice recognition software. I have made every reasonable attempt to correct obvious errors, but I expect that there are errors of grammar and possibly content that I did not discover before finalizing the note.      "

## 2022-05-24 ENCOUNTER — PATIENT MESSAGE (OUTPATIENT)
Dept: MEDICAL GROUP | Facility: PHYSICIAN GROUP | Age: 67
End: 2022-05-24
Payer: COMMERCIAL

## 2022-05-24 RX ORDER — PANTOPRAZOLE SODIUM 40 MG/1
40 TABLET, DELAYED RELEASE ORAL DAILY
Qty: 30 TABLET | Refills: 3 | Status: SHIPPED | OUTPATIENT
Start: 2022-05-24 | End: 2022-06-28

## 2022-05-31 ENCOUNTER — HOSPITAL ENCOUNTER (OUTPATIENT)
Dept: RADIOLOGY | Facility: MEDICAL CENTER | Age: 67
End: 2022-05-31
Attending: FAMILY MEDICINE
Payer: COMMERCIAL

## 2022-05-31 DIAGNOSIS — R06.02 SOB (SHORTNESS OF BREATH): ICD-10-CM

## 2022-05-31 PROCEDURE — 71046 X-RAY EXAM CHEST 2 VIEWS: CPT

## 2022-06-06 ENCOUNTER — HOSPITAL ENCOUNTER (OUTPATIENT)
Dept: RADIOLOGY | Facility: MEDICAL CENTER | Age: 67
End: 2022-06-06
Attending: PHYSICIAN ASSISTANT
Payer: COMMERCIAL

## 2022-06-06 DIAGNOSIS — M79.604 RIGHT LEG PAIN: ICD-10-CM

## 2022-06-06 PROCEDURE — 93971 EXTREMITY STUDY: CPT | Mod: RT

## 2022-06-28 ENCOUNTER — OFFICE VISIT (OUTPATIENT)
Dept: MEDICAL GROUP | Facility: PHYSICIAN GROUP | Age: 67
End: 2022-06-28
Payer: COMMERCIAL

## 2022-06-28 VITALS
DIASTOLIC BLOOD PRESSURE: 66 MMHG | OXYGEN SATURATION: 93 % | WEIGHT: 192 LBS | HEART RATE: 109 BPM | BODY MASS INDEX: 34.02 KG/M2 | TEMPERATURE: 98 F | SYSTOLIC BLOOD PRESSURE: 110 MMHG | HEIGHT: 63 IN

## 2022-06-28 DIAGNOSIS — R06.02 SOB (SHORTNESS OF BREATH): ICD-10-CM

## 2022-06-28 DIAGNOSIS — K59.1 FUNCTIONAL DIARRHEA: ICD-10-CM

## 2022-06-28 PROCEDURE — 99214 OFFICE O/P EST MOD 30 MIN: CPT | Performed by: FAMILY MEDICINE

## 2022-06-28 RX ORDER — DICYCLOMINE HYDROCHLORIDE 10 MG/1
10 CAPSULE ORAL
Qty: 120 CAPSULE | Refills: 1 | Status: SHIPPED | OUTPATIENT
Start: 2022-06-28 | End: 2022-07-20

## 2022-06-28 ASSESSMENT — FIBROSIS 4 INDEX: FIB4 SCORE: 1.09

## 2022-06-28 NOTE — PROGRESS NOTES
"Subjective:     Chief Complaint   Patient presents with   • Follow-Up     6 wk       HPI:   Nila presents today to discuss the following.    SOB (shortness of breath)  Chronic issue  Since covid 12/20 has had some SOB  Pulse ox normal  DX chest is normal   Sometimes she does have some orthopnea  No leg edema  Activity may elicit it      We did a trial of ICS which is helping    Functional diarrhea  Has had diarrhea for the past 8 days  No blood     Denies any n/v  Has some epigastric pain       Past Medical History:   Diagnosis Date   • Gastritis    • High cholesterol    • Hyperlipidemia    • Hypertension    • Pain     BACK PAIN   • PONV (postoperative nausea and vomiting)        Current Outpatient Medications Ordered in Epic   Medication Sig Dispense Refill   • dicyclomine (BENTYL) 10 MG Cap Take 1 Capsule by mouth 4 Times a Day,Before Meals and at Bedtime. 120 Capsule 1   • gabapentin (NEURONTIN) 600 MG tablet TOME BRANDY TABLETA JAYJAY VECES AL D A SEG N LO INDICADO FOR 30 DAYS     • fluticasone (FLOVENT HFA) 110 MCG/ACT Aerosol Inhale 1 Puff 2 times a day. 1 Each 3   • atorvastatin (LIPITOR) 20 MG Tab TOME BRANDY TABLETA TODOS [place]IT[place] EN LA NOCHE 90 Tablet 0   • metoprolol SR (TOPROL XL) 25 MG TABLET SR 24 HR TOME BRANDY TABLETA TODOS LOS WHITFIELD (Patient taking differently: Take 25 mg by mouth every evening.) 90 Tablet 3     No current Epic-ordered facility-administered medications on file.       Allergies:  Penicillins    Health Maintenance: Completed    ROS:  Gen: no fevers/chills, no changes in weight  Eyes: no changes in vision  Pulm: no sob, no cough  CV: no chest pain, no palpitations  GI: no nausea/vomiting, no diarrhea      Objective:     Exam:  /66 (BP Location: Left arm, Patient Position: Sitting, BP Cuff Size: Adult)   Pulse (!) 109   Temp 36.7 °C (98 °F) (Temporal)   Ht 1.6 m (5' 3\")   Wt 87.1 kg (192 lb)   LMP  (LMP Unknown)   SpO2 93%   BMI 34.01 kg/m²  Body mass index is 34.01 " kg/m².    Gen: Alert and oriented, No apparent distress.  HENT: TMs are clear. Oropharynx is w/o erythema or exudates. Neck is supple without cervical lymphadenopathy. No JVD.   Eyes: PERRLA  Lungs: Normal effort, CTA bilaterally, no wheezes, rhonchi, or rales  CV: Regular rate and rhythm. No murmurs, rubs, or gallops.  Abdomen: Soft, nontender, nondistended. Normal bowel sounds. Liver and spleen are not palpable  Ext: No clubbing, cyanosis, edema.  Skin: no rash, lesions or ulcers  Neuro: Moves all extremities.  Psych: AAOx3      Assessment & Plan:     66 y.o. female with the following -     1. SOB (shortness of breath)  New problem.  Unknown etiology and prognosis.  She did respond to ICS.  I would like to order additional work-up and reassess in 6 weeks.  Pulse ox is normal.  Return precautions recommended.  - EC-ECHOCARDIOGRAM COMPLETE W/O CONT; Future  - PULMONARY FUNCTION TESTS -Test requested: Complete Pulmonary Function Test; Future    2. Functional diarrhea  New problem.  Recommend trial of Bentyl and probiotics.  - dicyclomine (BENTYL) 10 MG Cap; Take 1 Capsule by mouth 4 Times a Day,Before Meals and at Bedtime.  Dispense: 120 Capsule; Refill: 1      Return in about 6 weeks (around 8/9/2022).    Please note that this dictation was created using voice recognition software. I have made every reasonable attempt to correct obvious errors, but I expect that there are errors of grammar and possibly content that I did not discover before finalizing the note.

## 2022-06-28 NOTE — ASSESSMENT & PLAN NOTE
Chronic issue  Since covid 12/20 has had some SOB  Pulse ox normal  DX chest is normal   Sometimes she does have some orthopnea  No leg edema  Activity may elicit it      We did a trial of ICS which is helping

## 2022-07-07 ENCOUNTER — HOSPITAL ENCOUNTER (OUTPATIENT)
Dept: CARDIOLOGY | Facility: MEDICAL CENTER | Age: 67
End: 2022-07-07
Attending: FAMILY MEDICINE
Payer: COMMERCIAL

## 2022-07-07 DIAGNOSIS — R06.02 SOB (SHORTNESS OF BREATH): ICD-10-CM

## 2022-07-07 LAB
LV EJECT FRACT  99904: 65
LV EJECT FRACT MOD 2C 99903: 65.13
LV EJECT FRACT MOD 4C 99902: 67.05
LV EJECT FRACT MOD BP 99901: 65.27

## 2022-07-07 PROCEDURE — 93306 TTE W/DOPPLER COMPLETE: CPT | Mod: 26 | Performed by: INTERNAL MEDICINE

## 2022-07-07 PROCEDURE — 93306 TTE W/DOPPLER COMPLETE: CPT

## 2022-07-10 DIAGNOSIS — E78.5 DYSLIPIDEMIA: ICD-10-CM

## 2022-07-11 RX ORDER — ATORVASTATIN CALCIUM 20 MG/1
TABLET, FILM COATED ORAL
Qty: 90 TABLET | Refills: 0 | Status: SHIPPED | OUTPATIENT
Start: 2022-07-11 | End: 2022-10-10

## 2022-07-20 DIAGNOSIS — K59.1 FUNCTIONAL DIARRHEA: ICD-10-CM

## 2022-07-21 RX ORDER — DICYCLOMINE HYDROCHLORIDE 10 MG/1
10 CAPSULE ORAL
Qty: 120 CAPSULE | Refills: 0 | Status: SHIPPED | OUTPATIENT
Start: 2022-07-21 | End: 2022-08-12

## 2022-07-22 ENCOUNTER — HOSPITAL ENCOUNTER (OUTPATIENT)
Dept: PULMONOLOGY | Facility: MEDICAL CENTER | Age: 67
End: 2022-07-22
Attending: FAMILY MEDICINE
Payer: COMMERCIAL

## 2022-07-22 DIAGNOSIS — R06.02 SOB (SHORTNESS OF BREATH): ICD-10-CM

## 2022-07-22 PROCEDURE — 94726 PLETHYSMOGRAPHY LUNG VOLUMES: CPT

## 2022-07-22 PROCEDURE — 94729 DIFFUSING CAPACITY: CPT

## 2022-07-22 PROCEDURE — 94060 EVALUATION OF WHEEZING: CPT

## 2022-07-22 PROCEDURE — 94726 PLETHYSMOGRAPHY LUNG VOLUMES: CPT | Mod: 26 | Performed by: INTERNAL MEDICINE

## 2022-07-22 PROCEDURE — 94729 DIFFUSING CAPACITY: CPT | Mod: 26 | Performed by: INTERNAL MEDICINE

## 2022-07-22 PROCEDURE — 94060 EVALUATION OF WHEEZING: CPT | Mod: 26 | Performed by: INTERNAL MEDICINE

## 2022-07-22 RX ORDER — ALBUTEROL SULFATE 2.5 MG/3ML
2.5 SOLUTION RESPIRATORY (INHALATION)
Status: DISCONTINUED | OUTPATIENT
Start: 2022-07-22 | End: 2022-07-23 | Stop reason: HOSPADM

## 2022-07-22 RX ADMIN — ALBUTEROL SULFATE 2.5 MG: 2.5 SOLUTION RESPIRATORY (INHALATION) at 14:41

## 2022-07-22 ASSESSMENT — PULMONARY FUNCTION TESTS
FVC_LLN: 2.43
FEV1/FVC_PERCENT_CHANGE: 0
FEV1_LLN: 1.90
FEV1_PREDICTED: 2.28
FVC_LLN: 2.43
FEV1/FVC_PERCENT_PREDICTED: 112
FEV1/FVC: 88.02
FVC: 2.28
FEV1/FVC_PREDICTED: 79
FEV1: 2.13
FEV1_LLN: 1.90
FEV1/FVC: 89
FVC_PERCENT_PREDICTED: 78
FVC_PERCENT_PREDICTED: 83
FEV1_PERCENT_CHANGE: 5
FEV1/FVC_PERCENT_LLN: 66
FEV1/FVC_PERCENT_LLN: 66
FEV1/FVC_PERCENT_PREDICTED: 112
FVC: 2.42
FEV1/FVC: 88
FEV1/FVC_PERCENT_PREDICTED: 113
FEV1_PERCENT_PREDICTED: 93
FEV1/FVC_PERCENT_CHANGE: 83
FEV1_PERCENT_PREDICTED: 88
FEV1/FVC: 88
FVC_PREDICTED: 2.9
FEV1: 2.02
FEV1_PERCENT_CHANGE: 6
FEV1/FVC_PERCENT_PREDICTED: 79
FEV1/FVC_PERCENT_PREDICTED: 111

## 2022-07-24 DIAGNOSIS — I10 ESSENTIAL HYPERTENSION: ICD-10-CM

## 2022-07-24 NOTE — PROCEDURES
DATE OF SERVICE:  07/22/2022     PULMONARY FUNCTION TEST INTERPRETATION     The results of this test meet the ATS standards for acceptability and   repeatability.     SPIROMETRY:  There is no evidence of obstruction, manifested by a normal ratio of FEV1/FVC   of 88%.  Noted that the FEV1 was 93% in the post-bronchodilator arm, which   correlates to 2.13 liters.  There is no response to bronchodilators in this test.     LUNG VOLUMES:  There is evidence of a restriction that is mild, manifested by a TLC of 79%,   which correlates to 3.92 liters.  The RV is normal at 98%.     DIFFUSION CAPACITY:  There is no impairment in the gas transfer, manifested by a normal DLCO of   106%.     FLOW VOLUME CURVES:  The flow volume curves correlates to a lesser degree with the information   above.     CONCLUSION:  There is evidence of a simple mild restrictive pattern, clinical   and radiographic correlation is recommended.    Restrictive pattern in PFTs can be due to multiple causes, can be divided in intrinsic causes such as interstitial lung diseases, inorganic dust exposure such as silicosis, asbestosis, talc, pneumoconiosis, berylliosis, hard metal fibrosis, coal worker's pneumoconiosis, 's lung, organic dust exposure such as farmer's lung, bird fancier's lung, bagassosis, and mushroom worker's lung, humidifier lung, hot tub pneumonitis, other such as HP, vasculitis.; Medications such as nitrofurantoin, amiodarone, gold, phenytoin, thiazides, hydralazine, bleomycin, carmustine, cyclophosphamide, methotrexate and radiation therapy among others.  And the intrinsic causes are: Kyphoscoliosis, pleural conditions such as effusions, trapped lung, pleural scarring, chronic empyema, asbestosis, obesity, neuromuscular disorders like muscular dystrophy, amyotrophic lateral sclerosis, polio, and phrenic neuropathies and ascites.      ______________________________  MD MIS Smith/TERE    DD:  07/23/2022  17:53  DT:  07/23/2022 19:11    Job#:  [id number]

## 2022-07-25 RX ORDER — METOPROLOL SUCCINATE 25 MG/1
25 TABLET, EXTENDED RELEASE ORAL EVERY EVENING
Qty: 90 TABLET | Refills: 1 | Status: SHIPPED | OUTPATIENT
Start: 2022-07-25 | End: 2022-12-31

## 2022-08-12 ENCOUNTER — OFFICE VISIT (OUTPATIENT)
Dept: MEDICAL GROUP | Facility: PHYSICIAN GROUP | Age: 67
End: 2022-08-12
Payer: COMMERCIAL

## 2022-08-12 VITALS
HEIGHT: 63 IN | TEMPERATURE: 97.2 F | OXYGEN SATURATION: 93 % | BODY MASS INDEX: 35.08 KG/M2 | DIASTOLIC BLOOD PRESSURE: 74 MMHG | SYSTOLIC BLOOD PRESSURE: 120 MMHG | HEART RATE: 75 BPM | WEIGHT: 198 LBS

## 2022-08-12 DIAGNOSIS — Z12.4 CERVICAL CANCER SCREENING: ICD-10-CM

## 2022-08-12 DIAGNOSIS — Z12.31 ENCOUNTER FOR SCREENING MAMMOGRAM FOR BREAST CANCER: ICD-10-CM

## 2022-08-12 DIAGNOSIS — R06.02 SOB (SHORTNESS OF BREATH): ICD-10-CM

## 2022-08-12 PROCEDURE — 99213 OFFICE O/P EST LOW 20 MIN: CPT | Performed by: FAMILY MEDICINE

## 2022-08-12 RX ORDER — FLUTICASONE PROPIONATE AND SALMETEROL 250; 50 UG/1; UG/1
1 POWDER RESPIRATORY (INHALATION) EVERY 12 HOURS
Qty: 60 EACH | Refills: 3 | Status: SHIPPED | OUTPATIENT
Start: 2022-08-12 | End: 2022-09-15

## 2022-08-12 RX ORDER — GABAPENTIN 800 MG/1
TABLET ORAL
COMMUNITY
Start: 2022-07-24

## 2022-08-12 ASSESSMENT — FIBROSIS 4 INDEX: FIB4 SCORE: 1.09

## 2022-08-12 NOTE — PROGRESS NOTES
"Subjective:     Chief Complaint   Patient presents with    Results    Requesting Labs     Pap, Mammo    Referral Needed     Podiatry, ingrown toe nails        HPI:   Nila presents today to discuss the following.    SOB (shortness of breath)  Chronic issue  Since covid 12/20 has had some SOB  Pulse ox normal  DX chest is normal   Sometimes she does have some orthopnea  No leg edema  Activity may elicit it     TTE was normal  Sometimes she wheezes  Trial of ICS greatly helped    Past Medical History:   Diagnosis Date    Gastritis     High cholesterol     Hyperlipidemia     Hypertension     Pain     BACK PAIN    PONV (postoperative nausea and vomiting)        Current Outpatient Medications Ordered in Epic   Medication Sig Dispense Refill    fluticasone-salmeterol (ADVAIR) 250-50 MCG/ACT AEROSOL POWDER, BREATH ACTIVATED Inhale 1 Puff every 12 hours. 60 Each 3    metoprolol SR (TOPROL XL) 25 MG TABLET SR 24 HR Take 1 Tablet by mouth every evening. 90 Tablet 1    atorvastatin (LIPITOR) 20 MG Tab TOME BRANDY TABLETA TODOS [place] EN LA NOCHE 90 Tablet 0    gabapentin (NEURONTIN) 800 MG tablet TOME BRANDY TABLETA JAYJAY VECES AL D A SEG N LO INDICADO FOR 30 DAYS       No current [facility]-ordered facility-administered medications on file.       Allergies:  Penicillins    Health Maintenance: Completed    ROS:  Gen: no fevers/chills, no changes in weight  Eyes: no changes in vision  Pulm: no sob, no cough  CV: no chest pain, no palpitations  GI: no nausea/vomiting, no diarrhea      Objective:     Exam:  /74 (BP Location: Left arm, Patient Position: Sitting, BP Cuff Size: Adult)   Pulse 75   Temp 36.2 °C (97.2 °F) (Temporal)   Ht 1.6 m (5' 3\")   Wt 89.8 kg (198 lb)   LMP  (LMP Unknown)   SpO2 93%   BMI 35.07 kg/m²  Body mass index is 35.07 kg/m².      Constitutional: Alert, no distress, well-groomed.  Skin: Warm, dry, good turgor, no rashes in visible areas.  Eye: Equal, round and reactive, conjunctiva clear, lids " normal.  ENMT: Lips without lesions, good dentition, moist mucous membranes.  Neck: Trachea midline, no masses, no thyromegaly.  Respiratory: Unlabored respiratory effort, no cough.  MSK: Normal gait, moves all extremities.  Neuro: Grossly non-focal.   Psych: Alert and oriented x3, normal affect and mood.        Assessment & Plan:     66 y.o. female with the following -     1. SOB (shortness of breath)  Condition.  Likely reactive airway.  We will do a trial of Advair  - fluticasone-salmeterol (ADVAIR) 250-50 MCG/ACT AEROSOL POWDER, BREATH ACTIVATED; Inhale 1 Puff every 12 hours.  Dispense: 60 Each; Refill: 3    2. Encounter for screening mammogram for breast cancer  - MA-SCREENING MAMMO BILAT W/ IMPLANTS W/CAD; Future    3. Cervical cancer screening  - Referral to Gynecology      Return in about 4 weeks (around 9/9/2022).    Please note that this dictation was created using voice recognition software. I have made every reasonable attempt to correct obvious errors, but I expect that there are errors of grammar and possibly content that I did not discover before finalizing the note.

## 2022-08-12 NOTE — ASSESSMENT & PLAN NOTE
Chronic issue  Since covid 12/20 has had some SOB  Pulse ox normal  DX chest is normal   Sometimes she does have some orthopnea  No leg edema  Activity may elicit it     TTE was normal  Sometimes she wheezes  Trial of ICS greatly helped

## 2022-08-18 ENCOUNTER — HOSPITAL ENCOUNTER (OUTPATIENT)
Dept: RADIOLOGY | Facility: MEDICAL CENTER | Age: 67
End: 2022-08-18
Attending: FAMILY MEDICINE
Payer: COMMERCIAL

## 2022-08-18 DIAGNOSIS — Z12.31 ENCOUNTER FOR SCREENING MAMMOGRAM FOR BREAST CANCER: ICD-10-CM

## 2022-08-18 PROCEDURE — 77063 BREAST TOMOSYNTHESIS BI: CPT

## 2022-09-08 ENCOUNTER — GYNECOLOGY VISIT (OUTPATIENT)
Dept: OBGYN | Facility: CLINIC | Age: 67
End: 2022-09-08
Payer: COMMERCIAL

## 2022-09-08 ENCOUNTER — HOSPITAL ENCOUNTER (OUTPATIENT)
Facility: MEDICAL CENTER | Age: 67
End: 2022-09-08
Attending: OBSTETRICS & GYNECOLOGY
Payer: COMMERCIAL

## 2022-09-08 VITALS — DIASTOLIC BLOOD PRESSURE: 89 MMHG | BODY MASS INDEX: 35.07 KG/M2 | WEIGHT: 198 LBS | SYSTOLIC BLOOD PRESSURE: 141 MMHG

## 2022-09-08 DIAGNOSIS — Z01.419 WELL WOMAN EXAM WITH ROUTINE GYNECOLOGICAL EXAM: ICD-10-CM

## 2022-09-08 DIAGNOSIS — Z12.39 ENCOUNTER FOR BREAST CANCER SCREENING USING NON-MAMMOGRAM MODALITY: ICD-10-CM

## 2022-09-08 DIAGNOSIS — Z12.4 SCREENING FOR CERVICAL CANCER: ICD-10-CM

## 2022-09-08 DIAGNOSIS — B97.7 HIGH RISK HPV INFECTION: ICD-10-CM

## 2022-09-08 PROCEDURE — 99397 PER PM REEVAL EST PAT 65+ YR: CPT | Performed by: OBSTETRICS & GYNECOLOGY

## 2022-09-08 PROCEDURE — 87624 HPV HI-RISK TYP POOLED RSLT: CPT

## 2022-09-08 PROCEDURE — 88175 CYTOPATH C/V AUTO FLUID REDO: CPT

## 2022-09-08 RX ORDER — FLUTICASONE PROPIONATE AND SALMETEROL 250; 50 UG/1; UG/1
1 POWDER RESPIRATORY (INHALATION) EVERY 12 HOURS
Qty: 60 EACH | Refills: 3 | Status: SHIPPED | OUTPATIENT
Start: 2022-09-08 | End: 2022-09-15

## 2022-09-08 ASSESSMENT — FIBROSIS 4 INDEX: FIB4 SCORE: 1.09

## 2022-09-08 NOTE — PROGRESS NOTES
Nila Villatoro is a 66 y.o.  female who presents for her Annual Gynecologic Exam      HPI Comments: Pt presents for her annual well woman exam. She has been followed for abnormal paps and seen here in  and .   Pt complains of no new medical illnesses. She does have pretty significant BLE pain despite having L4/5 laminectomy and L3-S1 foraminotomies earlier this year.     Patient reports menopause at age 51y. She denies  bleeding or pain.     Mammogram: 2022 normal- birads 2  Colonoscopy not recorded    Review of Systems:   Pertinent positives documented in HPI and all other systems reviewed & are negative    All PMH, PSH, allergies, social history and FH reviewed and updated today:    PGYN Hx:   Menopausal? YES. Date of menopause: age 51y. Bleeding since menopause: NO  Not sexually active in the past 6 months  Pap smear hx:  2018- Normal pap with +HPV in  and 2021- ASUC HPV+ and colpo with low grade dysplasia on biopsy    OB HISTORY:    2 X c/s     Past Medical History:   Diagnosis Date    Gastritis     High cholesterol     Hyperlipidemia     Hypertension     Pain     BACK PAIN    PONV (postoperative nausea and vomiting)      Past Surgical History:   Procedure Laterality Date    LUMBAR LAMINECTOMY DISKECTOMY  2022    Procedure: LAMINECTOMY, SPINE, LUMBAR, WITHL DISCECTOMY - L5 WITH L4-S1 MEDIAL FACETECTOMY;  Surgeon: Duane Weiss M.D.;  Location: SURGERY [facility];  Service: Neurosurgery    FORAMINOTOMY  2022    Procedure: FORAMINOTOMY, SPINE;  Surgeon: Duane Weiss M.D.;  Location: SURGERY [facility];  Service: Neurosurgery    CHOLECYSTECTOMY      BREAST BIOPSY      PRIMARY C SECTION      3 times, last one in     US-NEEDLE CORE BX-BREAST PANEL Left      Medications:   Current Outpatient Medications Ordered in Epic   Medication Sig Dispense Refill    gabapentin (NEURONTIN) 800 MG tablet TOME BRANDY TABLETA JAYJAY VECES AL D A SEG N LO INDICADO  FOR 30 DAYS      fluticasone-salmeterol (ADVAIR) 250-50 MCG/ACT AEROSOL POWDER, BREATH ACTIVATED Inhale 1 Puff every 12 hours. 60 Each 3    metoprolol SR (TOPROL XL) 25 MG TABLET SR 24 HR Take 1 Tablet by mouth every evening. 90 Tablet 1    atorvastatin (LIPITOR) 20 MG Tab TOME BRANDY TABLETA TODOS LOS WHITFIELD EN LA NOCHE 90 Tablet 0     No current Epic-ordered facility-administered medications on file.     Penicillins  Social History     Socioeconomic History    Marital status:    Tobacco Use    Smoking status: Never    Smokeless tobacco: Never   Vaping Use    Vaping Use: Never used   Substance and Sexual Activity    Alcohol use: Not Currently    Drug use: No    Sexual activity: Not Currently     Partners: Male     Birth control/protection: Post-Menopausal     Family History   Problem Relation Age of Onset    Cancer Mother         Cervical Cancer    Heart Attack Father           Objective:   Vital measurements:  BP (!) 141/89 (BP Location: Right arm, Patient Position: Sitting, BP Cuff Size: Adult)   Wt 89.8 kg (198 lb)   Body mass index is 35.07 kg/m². (Goal BM I>18 <25)    Physical Exam   Nursing note and vitals reviewed.  Constitutional: She is oriented to person, place, and time. She appears well-developed and well-nourished. No distress.     HEENT:   Head: Normocephalic. Bruising over left eye/ eye lid (pt reports she opened the car door into her head)    Neck: Normal range of motion. Neck supple. No tracheal deviation present. No thyromegaly present. No cervical or supraclavicular lymphadenopathy.    Pulmonary/Chest: Effort normal and breath sounds normal. No respiratory distress. She has no wheezes. She has no rales. She exhibits no tenderness.     Cardiovascular: Regular, rate and rhythm. No edema.    Breast: Symmetrical, normal consistency without masses., No dimpling or skin changes, Normal nipples without discharge, no axillary lymphadenopathy    Abdominal: Soft. Bowel sounds are normal. She exhibits no  distension and no mass. No tenderness. She has no rebound and no guarding.     Genitourinary:  Pelvic exam was performed with patient supine.  External genitalia with no abnormal pigmentation, labial fusion, rash, tenderness, lesion or injury to the labia bilaterally.  Vagina is atrophicsmall with no lesions, foul discharge, erythema, tenderness or bleeding. No foreign body around the vagina or signs of injury.   Cervix exhibits no motion tenderness, no discharge and no friability, no lesions.   Uterus is small not deviated, not enlarged, not fixed and not tender.  Right adnexa displays no mass, no tenderness and no fullness. Very difficult exam secondary to body habitus.   Left adnexa displays no mass, no tenderness and no fullness.     Musculoskeletal: Normal range of motion. Non tender. She exhibits no edema and no tenderness.     Lymphadenopathy: She has no cervical or supraclavicular adenopathy.     Neurological: She is alert and oriented to person, place, and time. She exhibits normal muscle tone.     Skin: Skin is warm and dry. No rash noted. She is not diaphoretic. No erythema. No pallor.     Psychiatric: She has a normal mood and affect. Her behavior is normal. Judgment and thought content normal.        Assessment:     1. Well woman exam with routine gynecological exam  THINPREP PAP WITH HPV      2. Encounter for breast cancer screening using non-mammogram modality        3. Screening for cervical cancer  THINPREP PAP WITH HPV            Plan:     #Well Woman  - Pap and physical exam performed. Discuss pap smear screening guidelines.   - Self breast awareness discussed.  - Counseling: breast self exam, mammography screening, menopause, osteoporosis, and adequate intake of calcium and vitamin D  - Encouraged exercise and proper diet.  - Mammograms annually.   - See medications and orders placed in encounter report.  - Weight bearing exercise daily to strengthen bones  - Calcium 1200mg daily and Vitamin D  800 IU daily

## 2022-09-09 LAB — AMBIGUOUS DTTM AMBI4: NORMAL

## 2022-09-13 LAB
CYTOLOGY REG CYTOL: NORMAL
HPV HR 12 DNA CVX QL NAA+PROBE: NEGATIVE
HPV16 DNA SPEC QL NAA+PROBE: NEGATIVE
HPV18 DNA SPEC QL NAA+PROBE: NEGATIVE
SPECIMEN SOURCE: NORMAL

## 2022-09-15 ENCOUNTER — OFFICE VISIT (OUTPATIENT)
Dept: MEDICAL GROUP | Facility: PHYSICIAN GROUP | Age: 67
End: 2022-09-15
Payer: COMMERCIAL

## 2022-09-15 VITALS
BODY MASS INDEX: 35.61 KG/M2 | DIASTOLIC BLOOD PRESSURE: 74 MMHG | TEMPERATURE: 98.7 F | WEIGHT: 201 LBS | HEART RATE: 71 BPM | HEIGHT: 63 IN | OXYGEN SATURATION: 93 % | SYSTOLIC BLOOD PRESSURE: 126 MMHG

## 2022-09-15 DIAGNOSIS — R06.02 SOB (SHORTNESS OF BREATH): ICD-10-CM

## 2022-09-15 PROCEDURE — 99213 OFFICE O/P EST LOW 20 MIN: CPT | Performed by: FAMILY MEDICINE

## 2022-09-15 RX ORDER — FLUTICASONE PROPIONATE AND SALMETEROL 250; 50 UG/1; UG/1
1 POWDER RESPIRATORY (INHALATION) EVERY 12 HOURS
Qty: 60 EACH | Refills: 3 | Status: SHIPPED | OUTPATIENT
Start: 2022-09-15 | End: 2022-09-19

## 2022-09-15 ASSESSMENT — FIBROSIS 4 INDEX: FIB4 SCORE: 1.09

## 2022-09-15 NOTE — PROGRESS NOTES
"Subjective:     Chief Complaint   Patient presents with    Results     Asthma results along with recent results given.    Medication Management     Advair and Gabapentin        HPI:   Nila presents today to discuss the following.    SOB (shortness of breath)  Chronic issue  The patient has completed PFTs positive for restrictive lung?   However she has had this problem since covid 12/20.  Responding well to Advair.     Past Medical History:   Diagnosis Date    Gastritis     High cholesterol     Hyperlipidemia     Hypertension     Pain     BACK PAIN    PONV (postoperative nausea and vomiting)        Current Outpatient Medications Ordered in Epic   Medication Sig Dispense Refill    fluticasone-salmeterol (WIXELA INHUB) 250-50 MCG/ACT AEROSOL POWDER, BREATH ACTIVATED Inhale 1 Puff every 12 hours. 60 Each 3    gabapentin (NEURONTIN) 800 MG tablet TOME BRANDY TABLETA JAYJAY VECES AL D A SEG N LO INDICADO FOR 30 DAYS      metoprolol SR (TOPROL XL) 25 MG TABLET SR 24 HR Take 1 Tablet by mouth every evening. 90 Tablet 1    atorvastatin (LIPITOR) 20 MG Tab TOME BRANDY TABLETA TODOS LOS WHITFIELD EN LA NOCHE 90 Tablet 0     No current Epic-ordered facility-administered medications on file.       Allergies:  Penicillins    Health Maintenance: Completed    ROS:  Gen: no fevers/chills, no changes in weight  Eyes: no changes in vision  Pulm: no sob, no cough  CV: no chest pain, no palpitations  GI: no nausea/vomiting, no diarrhea      Objective:     Exam:  /74 (BP Location: Left arm, Patient Position: Sitting, BP Cuff Size: Adult)   Pulse 71   Temp 37.1 °C (98.7 °F) (Temporal)   Ht 1.6 m (5' 3\")   Wt 91.2 kg (201 lb)   LMP  (LMP Unknown)   SpO2 93%   BMI 35.61 kg/m²  Body mass index is 35.61 kg/m².      Constitutional: Alert, no distress, well-groomed.  Skin: Warm, dry, good turgor, no rashes in visible areas.  Eye: Equal, round and reactive, conjunctiva clear, lids normal.  ENMT: Lips without lesions, good dentition, moist " mucous membranes.  Neck: Trachea midline, no masses, no thyromegaly.  Respiratory: Unlabored respiratory effort, no cough.  MSK: Normal gait, moves all extremities.  Neuro: Grossly non-focal.   Psych: Alert and oriented x3, normal affect and mood.        Assessment & Plan:     66 y.o. female with the following -     1. SOB (shortness of breath)  Chronic condition.  Completed PFTs and they were positive for restrictive lung disease?  However she has responded well to Wixela.  At this time I would like to continue with current regimen and reassess in 3 months.  Low threshold to refer to pulmonology based on clinical outcome in 3 months.      Return in about 3 months (around 12/15/2022).          Please note that this dictation was created using voice recognition software. I have made every reasonable attempt to correct obvious errors, but I expect that there are errors of grammar and possibly content that I did not discover before finalizing the note.

## 2022-09-15 NOTE — ASSESSMENT & PLAN NOTE
Chronic issue  The patient has completed PFTs positive for restrictive lung?   However she has had this problem since covid 12/20.  Responding well to Advair.

## 2022-09-19 ENCOUNTER — TELEPHONE (OUTPATIENT)
Dept: MEDICAL GROUP | Facility: PHYSICIAN GROUP | Age: 67
End: 2022-09-19
Payer: COMMERCIAL

## 2022-09-19 ENCOUNTER — TELEPHONE (OUTPATIENT)
Dept: OBGYN | Facility: CLINIC | Age: 67
End: 2022-09-19
Payer: COMMERCIAL

## 2022-09-19 RX ORDER — BUDESONIDE AND FORMOTEROL FUMARATE DIHYDRATE 160; 4.5 UG/1; UG/1
2 AEROSOL RESPIRATORY (INHALATION) 2 TIMES DAILY
Qty: 10 G | Refills: 3 | Status: SHIPPED | OUTPATIENT
Start: 2022-09-19 | End: 2023-01-27

## 2022-09-19 NOTE — TELEPHONE ENCOUNTER
Wixela [employer]- was denied by insurance, scanned in media.           Please inform me when seen.

## 2022-09-19 NOTE — TELEPHONE ENCOUNTER
Joavnny Herring called patient to relay abnormal Pap Results (+HPV and ASCUS). Also, inform her the need for a colposcopy. Patient verbalized understanding and was transferred to [facility] to schedule Colpo.

## 2022-09-23 ENCOUNTER — OFFICE VISIT (OUTPATIENT)
Dept: URGENT CARE | Facility: PHYSICIAN GROUP | Age: 67
End: 2022-09-23
Payer: COMMERCIAL

## 2022-09-23 VITALS
TEMPERATURE: 97.9 F | WEIGHT: 199.2 LBS | RESPIRATION RATE: 18 BRPM | HEART RATE: 78 BPM | HEIGHT: 63 IN | OXYGEN SATURATION: 94 % | BODY MASS INDEX: 35.3 KG/M2 | SYSTOLIC BLOOD PRESSURE: 124 MMHG | DIASTOLIC BLOOD PRESSURE: 76 MMHG

## 2022-09-23 DIAGNOSIS — R05.9 COUGH: ICD-10-CM

## 2022-09-23 DIAGNOSIS — J02.9 PHARYNGITIS, UNSPECIFIED ETIOLOGY: ICD-10-CM

## 2022-09-23 LAB
EXTERNAL QUALITY CONTROL: NORMAL
INT CON NEG: NEGATIVE
INT CON NEG: NEGATIVE
INT CON POS: POSITIVE
INT CON POS: POSITIVE
S PYO AG THROAT QL: NEGATIVE
SARS-COV+SARS-COV-2 AG RESP QL IA.RAPID: NEGATIVE

## 2022-09-23 PROCEDURE — 87880 STREP A ASSAY W/OPTIC: CPT | Performed by: NURSE PRACTITIONER

## 2022-09-23 PROCEDURE — 87426 SARSCOV CORONAVIRUS AG IA: CPT | Performed by: NURSE PRACTITIONER

## 2022-09-23 PROCEDURE — 99213 OFFICE O/P EST LOW 20 MIN: CPT | Performed by: NURSE PRACTITIONER

## 2022-09-23 RX ORDER — BENZONATATE 100 MG/1
200 CAPSULE ORAL 3 TIMES DAILY PRN
Qty: 30 CAPSULE | Refills: 0 | Status: SHIPPED | OUTPATIENT
Start: 2022-09-23 | End: 2022-10-03

## 2022-09-23 RX ORDER — GUAIFENESIN 600 MG/1
600 TABLET, EXTENDED RELEASE ORAL EVERY 12 HOURS
Qty: 28 TABLET | Refills: 0 | Status: SHIPPED | OUTPATIENT
Start: 2022-09-23 | End: 2022-09-23

## 2022-09-23 ASSESSMENT — FIBROSIS 4 INDEX: FIB4 SCORE: 1.09

## 2022-09-24 NOTE — PROGRESS NOTES
Patient has consented to treatment and for use of patient information for treatment and billing purposes.    Date: 09/23/22     Arrival Mode: Private Vehicle / Ambulatory    Chief Complaint:    Chief Complaint   Patient presents with    Cough     With phlegm     Pharyngitis     Hard try swallowing x 2 days     Headache        History of Present Illness: 66 y.o. female  presents with two days cough that is productive, sore throat with painful swallowing for the past 2 days and nasal congestion.  Patient also admits to right ear pain and sinus headache.  This is not the worst headache of her life.  Patient has been taking Jaki-Clifton and states it does help minimally.  Denies severe shortness of breath chest pain or leg swelling.  Denies nausea vomiting diarrhea.    ROS:  As stated in HPI     Pertinent Medical History:    Past Medical History:   Diagnosis Date    Gastritis     High cholesterol     Hyperlipidemia     Hypertension     Pain     BACK PAIN    PONV (postoperative nausea and vomiting)         Pertinent Surgical History:    Past Surgical History:   Procedure Laterality Date    LUMBAR LAMINECTOMY DISKECTOMY  4/26/2022    Procedure: LAMINECTOMY, SPINE, LUMBAR, WITHL DISCECTOMY - L5 WITH L4-S1 MEDIAL FACETECTOMY;  Surgeon: Duane Weiss M.D.;  Location: SURGERY [facility];  Service: Neurosurgery    FORAMINOTOMY  4/26/2022    Procedure: FORAMINOTOMY, SPINE;  Surgeon: Duane Weiss M.D.;  Location: SURGERY [facility];  Service: Neurosurgery    CHOLECYSTECTOMY  2009    BREAST BIOPSY      PRIMARY C SECTION      3 times, last one in 1986    US-NEEDLE CORE BX-BREAST PANEL Left         Current  Medications:  Current Outpatient Medications on File Prior to Visit   Medication Sig Dispense Refill    budesonide-formoterol (SYMBICORT) 160-4.5 MCG/ACT Aerosol Inhale 2 Puffs 2 times a day. 10 g 3    gabapentin (NEURONTIN) 800 MG tablet TOME BRANDY TABLETA JAYJAY VECES AL D A SEG N LO INDICADO FOR 30 DAYS      metoprolol  SR (TOPROL XL) 25 MG TABLET SR 24 HR Take 1 Tablet by mouth every evening. 90 Tablet 1    atorvastatin (LIPITOR) 20 MG Tab TOME BRANDY TABLETA TODOS LOS WHITFIELD EN LA NOCHE 90 Tablet 0     No current facility-administered medications on file prior to visit.        Allergies:     Penicillins     Social History:   Social History     Socioeconomic History    Marital status:      Spouse name: Not on file    Number of children: Not on file    Years of education: Not on file    Highest education level: Not on file   Occupational History    Not on file   Tobacco Use    Smoking status: Never    Smokeless tobacco: Never   Vaping Use    Vaping Use: Never used   Substance and Sexual Activity    Alcohol use: Not Currently    Drug use: No    Sexual activity: Not Currently     Partners: Male     Birth control/protection: Post-Menopausal   Other Topics Concern    Not on file   Social History Narrative    Not on file     Social Determinants of Health     Financial Resource Strain: Not on file   Food Insecurity: Not on file   Transportation Needs: Not on file   Physical Activity: Not on file   Stress: Not on file   Social Connections: Not on file   Intimate Partner Violence: Not on file   Housing Stability: Not on file        No LMP recorded (lmp unknown). Patient is postmenopausal.       Physical Exam:  Vitals:    09/23/22 1823   BP: 124/76   Pulse: 78   Resp: 18   Temp: 36.6 °C (97.9 °F)   SpO2: 94%        Physical Exam  Vitals reviewed.   Constitutional:       General: She is not in acute distress.     Appearance: Normal appearance. She is well-developed. She is not toxic-appearing.   HENT:      Head: Normocephalic and atraumatic.      Right Ear: Tympanic membrane, ear canal and external ear normal.      Left Ear: Tympanic membrane, ear canal and external ear normal.      Nose: Congestion and rhinorrhea present.      Mouth/Throat:      Lips: Pink.      Mouth: Mucous membranes are moist.      Pharynx: Posterior oropharyngeal  erythema present.   Eyes:      General: Lids are normal. Gaze aligned appropriately. No allergic shiner or scleral icterus.     Extraocular Movements: Extraocular movements intact.      Conjunctiva/sclera: Conjunctivae normal.      Pupils: Pupils are equal, round, and reactive to light.   Cardiovascular:      Rate and Rhythm: Normal rate and regular rhythm.      Pulses:           Radial pulses are 2+ on the right side and 2+ on the left side.      Heart sounds: Normal heart sounds.   Pulmonary:      Effort: Pulmonary effort is normal.      Breath sounds: Normal breath sounds. No wheezing.   Musculoskeletal:      Right lower leg: No edema.      Left lower leg: No edema.   Lymphadenopathy:      Cervical: No cervical adenopathy.   Skin:     General: Skin is warm.      Capillary Refill: Capillary refill takes less than 2 seconds.      Coloration: Skin is not cyanotic or pale.      Findings: No rash.   Neurological:      Mental Status: She is alert.      Gait: Gait is intact.   Psychiatric:         Behavior: Behavior normal. Behavior is cooperative.        Diagnostics:          Recent Results (from the past 24 hour(s))   POCT Rapid Strep A    Collection Time: 09/23/22  6:56 PM   Result Value Ref Range    Rapid Strep Screen Negative     Internal Control Positive Positive     Internal Control Negative Negative    POCT SARS-COV Antigen THEO (Symptomatic only)    Collection Time: 09/23/22  6:56 PM   Result Value Ref Range    Internal  Valid     SARS-COV ANTIGEN THEO Negative Negative, Indeterminate, None Detected, Not Detected, Detected, NotDetected, Valid, Invalid, Pass    Internal Control Positive Positive     Internal Control Negative Negative          Medical Decision Making:  I personally reviewed prior external notes and test results pertinent to today's visit.   Shared decision-making was utilized with patient did develop treatment plan and clinic course. Pleasant, 66 y.o. female 3-day history of cough  nasal congestion sore throat.  Fortunately exam findings are reassuring his lung sounds are clear on exam.  Did obtain a POCT strep and SARS COVID. Fortunately, both are negative.  Will send for Tessalon Perles for cough. Encouraged deep breathing exercises.    Did advise patient on conservative measures for management of symptoms.  Patient is agreeable to pursue adequate rest, adequate hydration, saltwater gargle and Neti pot for any symptoms of upper respiratory congestion.  Over-the-counter analgesia and antipyretics on a p.r.n. basis as needed for pain and fever.  Did discuss over-the-counter cough medications.      Patient will monitor symptoms closely for worsening and is advised to seek further evaluation the emergency room if alarm signs or symptoms arise.  Patient states understanding and verbalizes agreement with this plan of care.    Plan:    1. Pharyngitis, unspecified etiology    - POCT Rapid Strep A    2. Cough    - POCT SARS-COV Antigen THEO (Symptomatic only)  - benzonatate (TESSALON) 100 MG Cap; Take 2 Capsules by mouth 3 times a day as needed for Cough for up to 10 days.  Dispense: 30 Capsule; Refill: 0           Disposition:  Patient was discharged in stable condition.    Voice Recognition Disclaimer: Portions of this document were created using voice recognition software. The software does have a chance of producing errors of grammar and possibly content. I have made every reasonable attempt to correct obvious errors, but there may be errors of grammar and possibly content that I did not discover before finalizing the documentation.    Rebecca Rolon, A.P.R.N.

## 2022-10-01 ENCOUNTER — OFFICE VISIT (OUTPATIENT)
Dept: URGENT CARE | Facility: CLINIC | Age: 67
End: 2022-10-01
Payer: COMMERCIAL

## 2022-10-01 ENCOUNTER — APPOINTMENT (OUTPATIENT)
Dept: RADIOLOGY | Facility: MEDICAL CENTER | Age: 67
End: 2022-10-01
Attending: EMERGENCY MEDICINE
Payer: COMMERCIAL

## 2022-10-01 ENCOUNTER — HOSPITAL ENCOUNTER (EMERGENCY)
Facility: MEDICAL CENTER | Age: 67
End: 2022-10-01
Attending: EMERGENCY MEDICINE
Payer: COMMERCIAL

## 2022-10-01 VITALS
HEART RATE: 87 BPM | DIASTOLIC BLOOD PRESSURE: 63 MMHG | SYSTOLIC BLOOD PRESSURE: 139 MMHG | OXYGEN SATURATION: 94 % | RESPIRATION RATE: 16 BRPM | HEIGHT: 63 IN | WEIGHT: 199.3 LBS | BODY MASS INDEX: 35.31 KG/M2 | TEMPERATURE: 98.2 F

## 2022-10-01 DIAGNOSIS — R10.32 LEFT LOWER QUADRANT ABDOMINAL PAIN: ICD-10-CM

## 2022-10-01 DIAGNOSIS — K57.92 DIVERTICULITIS: ICD-10-CM

## 2022-10-01 LAB
ALBUMIN SERPL BCP-MCNC: 4 G/DL (ref 3.2–4.9)
ALBUMIN/GLOB SERPL: 1.1 G/DL
ALP SERPL-CCNC: 140 U/L (ref 30–99)
ALT SERPL-CCNC: 20 U/L (ref 2–50)
ANION GAP SERPL CALC-SCNC: 10 MMOL/L (ref 7–16)
APPEARANCE UR: CLEAR
AST SERPL-CCNC: 23 U/L (ref 12–45)
BACTERIA #/AREA URNS HPF: NEGATIVE /HPF
BASOPHILS # BLD AUTO: 0.3 % (ref 0–1.8)
BASOPHILS # BLD: 0.04 K/UL (ref 0–0.12)
BILIRUB SERPL-MCNC: 1.1 MG/DL (ref 0.1–1.5)
BILIRUB UR QL STRIP.AUTO: NEGATIVE
BUN SERPL-MCNC: 11 MG/DL (ref 8–22)
CALCIUM SERPL-MCNC: 8.9 MG/DL (ref 8.5–10.5)
CHLORIDE SERPL-SCNC: 104 MMOL/L (ref 96–112)
CO2 SERPL-SCNC: 24 MMOL/L (ref 20–33)
COLOR UR: YELLOW
CREAT SERPL-MCNC: 0.82 MG/DL (ref 0.5–1.4)
EOSINOPHIL # BLD AUTO: 0.1 K/UL (ref 0–0.51)
EOSINOPHIL NFR BLD: 0.8 % (ref 0–6.9)
EPI CELLS #/AREA URNS HPF: NEGATIVE /HPF
ERYTHROCYTE [DISTWIDTH] IN BLOOD BY AUTOMATED COUNT: 47.2 FL (ref 35.9–50)
GFR SERPLBLD CREATININE-BSD FMLA CKD-EPI: 79 ML/MIN/1.73 M 2
GLOBULIN SER CALC-MCNC: 3.5 G/DL (ref 1.9–3.5)
GLUCOSE SERPL-MCNC: 124 MG/DL (ref 65–99)
GLUCOSE UR STRIP.AUTO-MCNC: NEGATIVE MG/DL
HCT VFR BLD AUTO: 38.8 % (ref 37–47)
HGB BLD-MCNC: 12.8 G/DL (ref 12–16)
HYALINE CASTS #/AREA URNS LPF: ABNORMAL /LPF
IMM GRANULOCYTES # BLD AUTO: 0.04 K/UL (ref 0–0.11)
IMM GRANULOCYTES NFR BLD AUTO: 0.3 % (ref 0–0.9)
KETONES UR STRIP.AUTO-MCNC: NEGATIVE MG/DL
LEUKOCYTE ESTERASE UR QL STRIP.AUTO: ABNORMAL
LIPASE SERPL-CCNC: 32 U/L (ref 11–82)
LYMPHOCYTES # BLD AUTO: 3.26 K/UL (ref 1–4.8)
LYMPHOCYTES NFR BLD: 26.2 % (ref 22–41)
MCH RBC QN AUTO: 29.1 PG (ref 27–33)
MCHC RBC AUTO-ENTMCNC: 33 G/DL (ref 33.6–35)
MCV RBC AUTO: 88.2 FL (ref 81.4–97.8)
MICRO URNS: ABNORMAL
MONOCYTES # BLD AUTO: 0.92 K/UL (ref 0–0.85)
MONOCYTES NFR BLD AUTO: 7.4 % (ref 0–13.4)
NEUTROPHILS # BLD AUTO: 8.06 K/UL (ref 2–7.15)
NEUTROPHILS NFR BLD: 65 % (ref 44–72)
NITRITE UR QL STRIP.AUTO: NEGATIVE
NRBC # BLD AUTO: 0 K/UL
NRBC BLD-RTO: 0 /100 WBC
PH UR STRIP.AUTO: 5.5 [PH] (ref 5–8)
PLATELET # BLD AUTO: 268 K/UL (ref 164–446)
PMV BLD AUTO: 9.6 FL (ref 9–12.9)
POTASSIUM SERPL-SCNC: 3.7 MMOL/L (ref 3.6–5.5)
PROT SERPL-MCNC: 7.5 G/DL (ref 6–8.2)
PROT UR QL STRIP: NEGATIVE MG/DL
RBC # BLD AUTO: 4.4 M/UL (ref 4.2–5.4)
RBC # URNS HPF: ABNORMAL /HPF
RBC UR QL AUTO: ABNORMAL
SODIUM SERPL-SCNC: 138 MMOL/L (ref 135–145)
SP GR UR STRIP.AUTO: 1.01
UROBILINOGEN UR STRIP.AUTO-MCNC: 1 MG/DL
WBC # BLD AUTO: 12.4 K/UL (ref 4.8–10.8)
WBC #/AREA URNS HPF: ABNORMAL /HPF

## 2022-10-01 PROCEDURE — 96374 THER/PROPH/DIAG INJ IV PUSH: CPT

## 2022-10-01 PROCEDURE — 700117 HCHG RX CONTRAST REV CODE 255: Performed by: EMERGENCY MEDICINE

## 2022-10-01 PROCEDURE — 700111 HCHG RX REV CODE 636 W/ 250 OVERRIDE (IP): Performed by: EMERGENCY MEDICINE

## 2022-10-01 PROCEDURE — A9270 NON-COVERED ITEM OR SERVICE: HCPCS | Performed by: EMERGENCY MEDICINE

## 2022-10-01 PROCEDURE — 80053 COMPREHEN METABOLIC PANEL: CPT

## 2022-10-01 PROCEDURE — 96375 TX/PRO/DX INJ NEW DRUG ADDON: CPT

## 2022-10-01 PROCEDURE — 81001 URINALYSIS AUTO W/SCOPE: CPT

## 2022-10-01 PROCEDURE — 74177 CT ABD & PELVIS W/CONTRAST: CPT

## 2022-10-01 PROCEDURE — 700102 HCHG RX REV CODE 250 W/ 637 OVERRIDE(OP): Performed by: EMERGENCY MEDICINE

## 2022-10-01 PROCEDURE — 83690 ASSAY OF LIPASE: CPT

## 2022-10-01 PROCEDURE — 36415 COLL VENOUS BLD VENIPUNCTURE: CPT

## 2022-10-01 PROCEDURE — 85025 COMPLETE CBC W/AUTO DIFF WBC: CPT

## 2022-10-01 PROCEDURE — 99285 EMERGENCY DEPT VISIT HI MDM: CPT

## 2022-10-01 RX ORDER — CEFDINIR 300 MG/1
300 CAPSULE ORAL ONCE
Status: COMPLETED | OUTPATIENT
Start: 2022-10-01 | End: 2022-10-01

## 2022-10-01 RX ORDER — ONDANSETRON 2 MG/ML
4 INJECTION INTRAMUSCULAR; INTRAVENOUS ONCE
Status: COMPLETED | OUTPATIENT
Start: 2022-10-01 | End: 2022-10-01

## 2022-10-01 RX ORDER — MORPHINE SULFATE 4 MG/ML
4 INJECTION INTRAVENOUS ONCE
Status: COMPLETED | OUTPATIENT
Start: 2022-10-01 | End: 2022-10-01

## 2022-10-01 RX ORDER — METRONIDAZOLE 500 MG/1
500 TABLET ORAL ONCE
Status: COMPLETED | OUTPATIENT
Start: 2022-10-01 | End: 2022-10-01

## 2022-10-01 RX ORDER — CEFDINIR 300 MG/1
300 CAPSULE ORAL 2 TIMES DAILY
Qty: 20 CAPSULE | Refills: 0 | Status: SHIPPED | OUTPATIENT
Start: 2022-10-01 | End: 2022-10-11

## 2022-10-01 RX ORDER — METRONIDAZOLE 500 MG/1
500 TABLET ORAL 3 TIMES DAILY
Qty: 30 TABLET | Refills: 0 | Status: SHIPPED | OUTPATIENT
Start: 2022-10-01 | End: 2022-10-11

## 2022-10-01 RX ADMIN — CEFDINIR 300 MG: 300 CAPSULE ORAL at 20:21

## 2022-10-01 RX ADMIN — METRONIDAZOLE 500 MG: 500 TABLET ORAL at 20:21

## 2022-10-01 RX ADMIN — IOHEXOL 100 ML: 350 INJECTION, SOLUTION INTRAVENOUS at 20:15

## 2022-10-01 RX ADMIN — ONDANSETRON 4 MG: 2 INJECTION INTRAMUSCULAR; INTRAVENOUS at 19:22

## 2022-10-01 RX ADMIN — MORPHINE SULFATE 4 MG: 4 INJECTION INTRAVENOUS at 19:22

## 2022-10-01 ASSESSMENT — FIBROSIS 4 INDEX: FIB4 SCORE: 1.09

## 2022-10-02 NOTE — ED NOTES
"Pt discharged home. IV discontinued and gauze placed, pt in possession of belongings. Pt provided discharge education and information pertaining to medications and follow up appointments. Pt received copy of discharge instructions and verbalized understanding. /63   Pulse 87   Temp 36.8 °C (98.2 °F) (Temporal)   Resp 16   Ht 1.6 m (5' 3\")   Wt 90.4 kg (199 lb 4.7 oz)   LMP  (LMP Unknown)   SpO2 94%   BMI 35.30 kg/m²     Language line  Lee #[id number] used for discharge education  "

## 2022-10-02 NOTE — ED PROVIDER NOTES
ED Provider Note        Primary care provider: John Lockett M.D.    I verified that the patient was wearing a mask and I was wearing appropriate PPE every time I entered the room. The patient's mask was on the patient at all times during my encounter except for a brief view of the oropharynx.      CHIEF COMPLAINT  Chief Complaint   Patient presents with    Abdominal Pain     Pain in the LLQ started yesterday. PT reports diarrhea x 1 month but believes due to medication she is taking but does not remember name. PT denies n/v. PT reports pain 7/10.        HPI  Nila Villatoro is a 66 y.o. female who presents to the Emergency Department with chief complaint of left lower quadrant pain x1 day.  Patient reports that earlier this morning acute onset severe stabbing left lower quadrant pain.  She said since that time she is felt as though she is at high fever she has been nauseous but has not vomited she said slight diarrhea no blood in her stool no vaginal bleeding or discharge she reports no urinary issues.  Pain is severe without modifying factors.  Previous cholecystectomy no other previous abdominal surgery however did recently have an abnormal Pap smear and status post colposcopy on the 27th of last month.  Slight frontal headache no chest pain or shortness of breath no other acute symptom changes or concerns at this time.    REVIEW OF SYSTEMS  10 systems reviewed and otherwise negative, pertinent positives and negatives listed in the history of present illness.    PAST MEDICAL HISTORY   has a past medical history of Gastritis, High cholesterol, Hyperlipidemia, Hypertension, Pain, and PONV (postoperative nausea and vomiting).    SURGICAL HISTORY   has a past surgical history that includes breast biopsy; us-needle core bx-breast panel (Left); cholecystectomy (2009); primary c section; lumbar laminectomy diskectomy (4/26/2022); and foraminotomy (4/26/2022).    SOCIAL HISTORY  Social History  "    Tobacco Use    Smoking status: Never    Smokeless tobacco: Never   Vaping Use    Vaping Use: Never used   Substance Use Topics    Alcohol use: Not Currently    Drug use: No      Social History     Substance and Sexual Activity   Drug Use No       FAMILY HISTORY  Non-Contributory    CURRENT MEDICATIONS  Home Medications       Reviewed by Rebecca Garner R.N. (Registered Nurse) on 10/01/22 at 1814  Med List Status: Partial     Medication Last Dose Status   atorvastatin (LIPITOR) 20 MG Tab  Active   benzonatate (TESSALON) 100 MG Cap  Active   budesonide-formoterol (SYMBICORT) 160-4.5 MCG/ACT Aerosol  Active   gabapentin (NEURONTIN) 800 MG tablet  Active   metoprolol SR (TOPROL XL) 25 MG TABLET SR 24 HR  Active                    ALLERGIES  Allergies   Allergen Reactions    Penicillins Hives     4/2022: tolerated cefazolin       PHYSICAL EXAM  VITAL SIGNS: BP (!) 159/92   Pulse (!) 103   Temp 36.7 °C (98.1 °F) (Temporal)   Resp 16   Ht 1.6 m (5' 3\")   Wt 90.4 kg (199 lb 4.7 oz)   LMP  (LMP Unknown)   SpO2 98%   BMI 35.30 kg/m²   Pulse ox interpretation: I interpret this pulse ox as normal.  Constitutional: Alert and oriented x 3, Distressd  HEENT: Atraumatic normocephalic, pupils are equal round, extraocular movements are intact. The nares is clear, external ears are normal, mouth shows moist mucous membranes  Neck: no obvious JVD or tracheal deviation  Cardiovascular: Regular rate and rhythm no murmur rub or gallop   Thorax & Lungs: No respiratory distress, no wheezes rales or rhonchi, No chest tenderness.   GI: Tender to palpation left lower quadrant no rebound or guarding positive bowel sounds nondistended  Skin: Warm dry no obvious acute rash or lesion  Musculoskeletal: Moving all extremities with normal range strength, no acute  deformity  Neurologic: Cranial nerves III through XII are grossly intact, no sensory deficit, no cerebellar dysfunction   Psychiatric: Appropriate affect for situation at this " time      DIAGNOSTIC STUDIES / PROCEDURES  LABS      Results for orders placed or performed during the hospital encounter of 10/01/22   CBC with Differential   Result Value Ref Range    WBC 12.4 (H) 4.8 - 10.8 K/uL    RBC 4.40 4.20 - 5.40 M/uL    Hemoglobin 12.8 12.0 - 16.0 g/dL    Hematocrit 38.8 37.0 - 47.0 %    MCV 88.2 81.4 - 97.8 fL    MCH 29.1 27.0 - 33.0 pg    MCHC 33.0 (L) 33.6 - 35.0 g/dL    RDW 47.2 35.9 - 50.0 fL    Platelet Count 268 164 - 446 K/uL    MPV 9.6 9.0 - 12.9 fL    Neutrophils-Polys 65.00 44.00 - 72.00 %    Lymphocytes 26.20 22.00 - 41.00 %    Monocytes 7.40 0.00 - 13.40 %    Eosinophils 0.80 0.00 - 6.90 %    Basophils 0.30 0.00 - 1.80 %    Immature Granulocytes 0.30 0.00 - 0.90 %    Nucleated RBC 0.00 /100 WBC    Neutrophils (Absolute) 8.06 (H) 2.00 - 7.15 K/uL    Lymphs (Absolute) 3.26 1.00 - 4.80 K/uL    Monos (Absolute) 0.92 (H) 0.00 - 0.85 K/uL    Eos (Absolute) 0.10 0.00 - 0.51 K/uL    Baso (Absolute) 0.04 0.00 - 0.12 K/uL    Immature Granulocytes (abs) 0.04 0.00 - 0.11 K/uL    NRBC (Absolute) 0.00 K/uL   Complete Metabolic Panel   Result Value Ref Range    Sodium 138 135 - 145 mmol/L    Potassium 3.7 3.6 - 5.5 mmol/L    Chloride 104 96 - 112 mmol/L    Co2 24 20 - 33 mmol/L    Anion Gap 10.0 7.0 - 16.0    Glucose 124 (H) 65 - 99 mg/dL    Bun 11 8 - 22 mg/dL    Creatinine 0.82 0.50 - 1.40 mg/dL    Calcium 8.9 8.5 - 10.5 mg/dL    AST(SGOT) 23 12 - 45 U/L    ALT(SGPT) 20 2 - 50 U/L    Alkaline Phosphatase 140 (H) 30 - 99 U/L    Total Bilirubin 1.1 0.1 - 1.5 mg/dL    Albumin 4.0 3.2 - 4.9 g/dL    Total Protein 7.5 6.0 - 8.2 g/dL    Globulin 3.5 1.9 - 3.5 g/dL    A-G Ratio 1.1 g/dL   Lipase   Result Value Ref Range    Lipase 32 11 - 82 U/L   Urinalysis    Specimen: Urine   Result Value Ref Range    Color Yellow     Character Clear     Specific Gravity 1.011 <1.035    Ph 5.5 5.0 - 8.0    Glucose Negative Negative mg/dL    Ketones Negative Negative mg/dL    Protein Negative Negative mg/dL     "Bilirubin Negative Negative    Urobilinogen, Urine 1.0 Negative    Nitrite Negative Negative    Leukocyte Esterase Small (A) Negative    Occult Blood Small (A) Negative    Micro Urine Req Microscopic    ESTIMATED GFR   Result Value Ref Range    GFR (CKD-EPI) 79 >60 mL/min/1.73 m 2   URINE MICROSCOPIC (W/UA)   Result Value Ref Range    WBC 5-10 (A) /hpf    RBC 0-2 /hpf    Bacteria Negative None /hpf    Epithelial Cells Negative /hpf    Hyaline Cast 0-2 /lpf       All labs reviewed by me.      RADIOLOGY  CT-ABDOMEN-PELVIS WITH   Final Result      1.  Findings are consistent with diverticulitis of the proximal sigmoid colon.      2.  No abscess or nondependent free air identified at this time.            COURSE & MEDICAL DECISION MAKING  Pertinent Labs & Imaging studies reviewed. (See chart for details)    6:51 PM - Patient seen and examined at bedside.       Patient noted to have slightly elevated blood pressure likely circumstantial secondary to presenting complaint. Referred to primary care physician for further evaluation.      Medical Decision Making: CT consistent with diverticulitis slight white count without left shift normal vital signs no evidence of perforation or abscess.  Given first dose of Omnicef and Flagyl here and prescribed the same.  Also given instructions on probiotic given instructions return here for worsening pain abdominal distention fevers chills nausea vomiting any other worsening symptom changes or concerns otherwise discharged in stable and improved condition.    BP (!) 159/92   Pulse (!) 103   Temp 36.7 °C (98.1 °F) (Temporal)   Resp 16   Ht 1.6 m (5' 3\")   Wt 90.4 kg (199 lb 4.7 oz)   LMP  (LMP Unknown)   SpO2 98%   BMI 35.30 kg/m²     John Lockett M.D.  [address]  [place] [address]  [phone]          Discharge Medication List as of 10/1/2022  8:23 PM        START taking these medications    Details   cefdinir (OMNICEF) 300 MG Cap Take 1 Capsule by mouth 2 " times a day for 10 days., Disp-20 Capsule, R-0, Normal      metroNIDAZOLE (FLAGYL) 500 MG Tab Take 1 Tablet by mouth 3 times a day for 10 days., Disp-30 Tablet, R-0, Normal             FINAL IMPRESSION  1. Left lower quadrant abdominal pain Active   2. Diverticulitis Active          This dictation has been created using voice recognition software and/or scribes. The accuracy of the dictation is limited by the abilities of the software and the expertise of the scribes. I expect there may be some errors of grammar and possibly content. I made every attempt to manually correct the errors within my dictation. However, errors related to voice recognition software and/or scribes may still exist and should be interpreted within the appropriate context.

## 2022-10-02 NOTE — ED TRIAGE NOTES
You are Important to us!  If you had tests done today, we promise to contact you with the results in 3-7 business days.    Some results take longer for us to get.  If you have not heard from our clinic by the 8th business day, please contact us at [phone].    If the test result is normal (OK), then one of our clinical caregivers will contact you by your preferred method.    If the test result is abnormal (!!!), then the Provider who ordered the test will contact you     Chief Complaint   Patient presents with    Abdominal Pain     Pain in the LLQ started yesterday. PT reports diarrhea x 1 month but believes due to medication she is taking but does not remember name. PT denies n/v. PT reports pain 7/10.

## 2022-10-27 ENCOUNTER — GYNECOLOGY VISIT (OUTPATIENT)
Dept: OBGYN | Facility: CLINIC | Age: 67
End: 2022-10-27
Payer: COMMERCIAL

## 2022-10-27 ENCOUNTER — HOSPITAL ENCOUNTER (OUTPATIENT)
Facility: MEDICAL CENTER | Age: 67
End: 2022-10-27
Attending: OBSTETRICS & GYNECOLOGY
Payer: COMMERCIAL

## 2022-10-27 VITALS — BODY MASS INDEX: 35.07 KG/M2 | DIASTOLIC BLOOD PRESSURE: 88 MMHG | WEIGHT: 198 LBS | SYSTOLIC BLOOD PRESSURE: 140 MMHG

## 2022-10-27 DIAGNOSIS — R87.610 ASCUS WITH POSITIVE HIGH RISK HPV CERVICAL: ICD-10-CM

## 2022-10-27 DIAGNOSIS — R87.810 ASCUS WITH POSITIVE HIGH RISK HPV CERVICAL: ICD-10-CM

## 2022-10-27 LAB
AMBIGUOUS DTTM AMBI4: NORMAL
PATHOLOGY CONSULT NOTE: NORMAL

## 2022-10-27 PROCEDURE — 88342 IMHCHEM/IMCYTCHM 1ST ANTB: CPT

## 2022-10-27 PROCEDURE — 88305 TISSUE EXAM BY PATHOLOGIST: CPT

## 2022-10-27 PROCEDURE — 57454 BX/CURETT OF CERVIX W/SCOPE: CPT | Performed by: OBSTETRICS & GYNECOLOGY

## 2022-10-27 ASSESSMENT — FIBROSIS 4 INDEX: FIB4 SCORE: 1.25

## 2022-10-27 NOTE — PROCEDURES
MarilinGeeta Villatoro  66 y.o.  Female  here today for colposcopy to evaluate her for abnormal pap:     Colposcopy    Indication:  long history of abnormal paps currently with ASCUS cannot rule out high grade    Prior to beginning the procedure, risk and benefits of a colposcopy with possibility of biopsies were discussed including the risk of infection, bleeding, and pain. Patient understands the risks associated with the procedure, questions have been answered and consents have been signed to the chart.    Procedure in detail:    Speculum is placed and cervix is visualized. The cervix is cleansed with dilute acetic acid solution.     Physical Exam  Genitourinary:          Satisfactory: YES   Squamo-columnar junction seen: YES   Entire lesion seen: YES   Biopsies done: YES   Biopsy site:  12, 6, 9 o'clock  ECC: YES  Hemostasis: obtained with the use of Monsels    Impression:  66 y.o.  here for colposcopy for ASCUS rule out high grade.   - long history of abnormals and previous colpo in  showing low grade dysplasia      Recommendations:  - pending pathology  - Post procedure instructions given in detail, all ?S answered.

## 2022-11-04 ENCOUNTER — TELEPHONE (OUTPATIENT)
Dept: OBGYN | Facility: CLINIC | Age: 67
End: 2022-11-04
Payer: COMMERCIAL

## 2022-11-04 NOTE — TELEPHONE ENCOUNTER
----- Message from Skip Fonseca Ass't sent at 11/3/2022 12:44 PM PDT -----    ----- Message -----  From: Linda Geiger M.D.  Sent: 10/31/2022   2:24 PM PDT  To: Skip Fonseca Ass't    This needs a LEEP. Please have a Georgian speaking MA call her and explain this to her and then we can get her scheduled. Thanks. REESE  11/4/22@9:05 am. Patient was notified about need for LEEP. Procedure explained to patient and also what to expect afterwards. She understands and agrees. Informed I will call her back to let her know date and time for her appt after is schedule. Nima SIMON will be notified to schedule pt.

## 2022-11-28 ENCOUNTER — GYNECOLOGY VISIT (OUTPATIENT)
Dept: OBGYN | Facility: CLINIC | Age: 67
End: 2022-11-28
Payer: COMMERCIAL

## 2022-11-28 ENCOUNTER — HOSPITAL ENCOUNTER (OUTPATIENT)
Facility: MEDICAL CENTER | Age: 67
End: 2022-11-28
Attending: OBSTETRICS & GYNECOLOGY
Payer: COMMERCIAL

## 2022-11-28 DIAGNOSIS — R87.613 HIGH GRADE SQUAMOUS INTRAEPITHELIAL CERVICAL DYSPLASIA: ICD-10-CM

## 2022-11-28 LAB — PATHOLOGY CONSULT NOTE: NORMAL

## 2022-11-28 PROCEDURE — 88307 TISSUE EXAM BY PATHOLOGIST: CPT

## 2022-11-28 PROCEDURE — 57460 BX OF CERVIX W/SCOPE LEEP: CPT | Performed by: OBSTETRICS & GYNECOLOGY

## 2022-11-28 PROCEDURE — 88305 TISSUE EXAM BY PATHOLOGIST: CPT

## 2022-11-28 RX ORDER — ROPINIROLE 0.5 MG/1
TABLET, FILM COATED ORAL
COMMUNITY
Start: 2022-10-19 | End: 2022-12-29 | Stop reason: SDUPTHER

## 2022-11-28 NOTE — PROGRESS NOTES
Date of Procedure: 11/28/22    Patient presents to office for LEEP procedure. She is doing well today and has no complaints.    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 Consent forms and procedure reviewed in detail with the patient with  present.  The patient was placed in the dorsal supine lithotomy position.      A coated metal bivalve speculum was placed in the vagina with the smoke evacuator attached and working. 1% lidocaine with epinephrine 6mL was injected into the cervix at 4 and 8 o'clock positions and then plain lidocaine approx 6 cc injected into the cervix circumferentially. A loop wire was placed on the bovie and used to take a biopsy of the ectocervix.  A square loop was then used to take a biopsy of the endocervix. Hemostasis was obtained at the LEEP bed with the ball tipped Bovie. Monsel solution was applied against the LEEP bed. Good hemostasis was noted.    All instruments were removed from the vagina. All needle and instrument counts were correct. The patient tolerated the procedure very well. She was discharged home from the office in stable condition.    PLAN: Biopsy to be sent to pathology. Further plan of care pending results.  Strict pelvic rest for 2 weeks. Pads only, no intercourse.  Will call patient next week with results. Bleeding precautions educated. Call MD with  questions/concerns.      ---------------------------------------------------------------------------------------------------------  Linda Geiger MD

## 2022-11-30 ENCOUNTER — HOSPITAL ENCOUNTER (OUTPATIENT)
Facility: MEDICAL CENTER | Age: 67
End: 2022-11-30
Attending: FAMILY MEDICINE
Payer: COMMERCIAL

## 2022-11-30 ENCOUNTER — OFFICE VISIT (OUTPATIENT)
Dept: URGENT CARE | Facility: PHYSICIAN GROUP | Age: 67
End: 2022-11-30
Payer: COMMERCIAL

## 2022-11-30 VITALS
BODY MASS INDEX: 34.02 KG/M2 | TEMPERATURE: 98.7 F | HEART RATE: 105 BPM | OXYGEN SATURATION: 96 % | SYSTOLIC BLOOD PRESSURE: 138 MMHG | RESPIRATION RATE: 17 BRPM | DIASTOLIC BLOOD PRESSURE: 78 MMHG | WEIGHT: 192 LBS | HEIGHT: 63 IN

## 2022-11-30 DIAGNOSIS — R50.9 FEVER, UNSPECIFIED FEVER CAUSE: ICD-10-CM

## 2022-11-30 DIAGNOSIS — R05.1 ACUTE COUGH: ICD-10-CM

## 2022-11-30 LAB
EXTERNAL QUALITY CONTROL: NORMAL
FLUAV+FLUBV AG SPEC QL IA: NEGATIVE
INT CON NEG: NEGATIVE
INT CON NEG: NEGATIVE
INT CON POS: POSITIVE
INT CON POS: POSITIVE
SARS-COV+SARS-COV-2 AG RESP QL IA.RAPID: NEGATIVE

## 2022-11-30 PROCEDURE — 87804 INFLUENZA ASSAY W/OPTIC: CPT | Performed by: FAMILY MEDICINE

## 2022-11-30 PROCEDURE — U0005 INFEC AGEN DETEC AMPLI PROBE: HCPCS

## 2022-11-30 PROCEDURE — 99213 OFFICE O/P EST LOW 20 MIN: CPT | Performed by: FAMILY MEDICINE

## 2022-11-30 PROCEDURE — U0003 INFECTIOUS AGENT DETECTION BY NUCLEIC ACID (DNA OR RNA); SEVERE ACUTE RESPIRATORY SYNDROME CORONAVIRUS 2 (SARS-COV-2) (CORONAVIRUS DISEASE [COVID-19]), AMPLIFIED PROBE TECHNIQUE, MAKING USE OF HIGH THROUGHPUT TECHNOLOGIES AS DESCRIBED BY CMS-2020-01-R: HCPCS

## 2022-11-30 PROCEDURE — 87426 SARSCOV CORONAVIRUS AG IA: CPT | Performed by: FAMILY MEDICINE

## 2022-11-30 RX ORDER — BENZONATATE 100 MG/1
100 CAPSULE ORAL 3 TIMES DAILY PRN
Qty: 30 CAPSULE | Refills: 0 | Status: SHIPPED | OUTPATIENT
Start: 2022-11-30 | End: 2022-12-08

## 2022-11-30 RX ORDER — OSELTAMIVIR PHOSPHATE 75 MG/1
75 CAPSULE ORAL 2 TIMES DAILY
Qty: 10 CAPSULE | Refills: 0 | Status: SHIPPED | OUTPATIENT
Start: 2022-11-30 | End: 2022-12-05

## 2022-11-30 ASSESSMENT — ENCOUNTER SYMPTOMS
NAUSEA: 0
VOMITING: 0
WEIGHT LOSS: 0
DIARRHEA: 0
EYE REDNESS: 0
EYE DISCHARGE: 0

## 2022-11-30 ASSESSMENT — FIBROSIS 4 INDEX: FIB4 SCORE: 1.27

## 2022-11-30 NOTE — PROGRESS NOTES
"Subjective     Nila Villatoro is a 67 y.o. female who presents with URI (Cough, fever, headache and chest pain x 1 day)            Onset yesterday dry cough, fever tmax 102F, HA, myalgia. SOB/wheze. PMH asthma and pneumonia with C19 in 2020. Using albuterol 2x daily. No know exposures. No other aggravating or alleviating factors.        Review of Systems   Constitutional:  Positive for malaise/fatigue. Negative for weight loss.   Eyes:  Negative for discharge and redness.   Gastrointestinal:  Negative for diarrhea, nausea and vomiting.   Musculoskeletal:  Negative for joint pain.   Skin:  Negative for itching and rash.            Objective     /78 (BP Location: Left arm, Patient Position: Sitting, BP Cuff Size: Large adult)   Pulse (!) 105   Temp 37.1 °C (98.7 °F) (Temporal)   Resp 17   Ht 1.6 m (5' 3\")   Wt 87.1 kg (192 lb)   LMP  (LMP Unknown)   SpO2 96%   BMI 34.01 kg/m²      Physical Exam  Constitutional:       General: She is not in acute distress.     Appearance: She is well-developed.   HENT:      Head: Normocephalic and atraumatic.   Eyes:      Conjunctiva/sclera: Conjunctivae normal.   Cardiovascular:      Rate and Rhythm: Normal rate and regular rhythm.      Heart sounds: Normal heart sounds. No murmur heard.  Pulmonary:      Effort: Pulmonary effort is normal.      Breath sounds: Normal breath sounds. No wheezing.   Skin:     General: Skin is warm and dry.      Findings: No rash.   Neurological:      Mental Status: She is alert.                           Assessment & Plan                 "

## 2022-11-30 NOTE — LETTER
November 30, 2022         Patient: Nila Villatoro   YOB: 1955   Date of Visit: 11/30/2022           To Whom it May Concern:    Nila Villatoro was seen in my clinic on 11/30/2022. Please excuse from work 12/1 and 12/2/2022.    Sincerely,           Senthil Conteh M.D.  Electronically Signed

## 2022-12-01 LAB
SARS-COV-2 RNA RESP QL NAA+PROBE: DETECTED
SPECIMEN SOURCE: ABNORMAL

## 2022-12-02 ENCOUNTER — PATIENT MESSAGE (OUTPATIENT)
Dept: MEDICAL GROUP | Facility: PHYSICIAN GROUP | Age: 67
End: 2022-12-02
Payer: COMMERCIAL

## 2022-12-02 NOTE — RESULT ENCOUNTER NOTE
Hi    Thanks for sending the Paxlovid Rx for Nila.     Nila, if the pharmacy didn't let you know, please hold your cholesterol medicine for 10 days while on the Paxlovid.

## 2022-12-07 ENCOUNTER — TELEPHONE (OUTPATIENT)
Dept: OBGYN | Facility: CLINIC | Age: 67
End: 2022-12-07
Payer: COMMERCIAL

## 2022-12-07 NOTE — TELEPHONE ENCOUNTER
Pt walked in stating a couple weeks aback she had a procedure done by Dr. Geiger and now she is having black d/c and a slight low abdominal pain. Reviewed pt's chart and pt had a LEEP on 11/28/2022 . Explained to pt the black d/c most likely is from a solution that is used at the time of the procedure to help to stop the bleeding. Discuss pt with Nima Geiger's MA and stated to add pt to provider schedule tomorrow at 0815. Pt scheduled. Pt agreed.

## 2022-12-08 ENCOUNTER — GYNECOLOGY VISIT (OUTPATIENT)
Dept: OBGYN | Facility: CLINIC | Age: 67
End: 2022-12-08
Payer: COMMERCIAL

## 2022-12-08 DIAGNOSIS — N87.1 DYSPLASIA OF CERVIX, HIGH GRADE CIN 2: ICD-10-CM

## 2022-12-08 PROCEDURE — 99212 OFFICE O/P EST SF 10 MIN: CPT | Performed by: OBSTETRICS & GYNECOLOGY

## 2022-12-08 NOTE — PROGRESS NOTES
GYN F/U    Cc: Follow up from LEEP    HPI: 67 y.o.  here for f/u of LEEP performed on . She states she had some light vaginal bleeding yesterday and now just dark black discharge. She denies pain or cramping. She did test positive for COVID on . She has been treated and reports she feels well now.       ROS:  Gen: denies fevers, general concerns  Abd: denies abdominal pain      Past Medical History:   Diagnosis Date    Gastritis     High cholesterol     Hyperlipidemia     Hypertension     Pain     BACK PAIN    PONV (postoperative nausea and vomiting)        LMP  (LMP Unknown)   Gen; AAO, NAD  Gu: EGBUS WNL, vagina pink and moist without lesions, cervix with dark black from cautery - appears c/w mohini, no bleeding, no dsicharge, healing well    A/P: 67 y.o.  with follow up from LEEP  Pathology reviewed with patient- shows JOEY 2 with clear resection margins  Plan is repeat cotest and ECC in 6 months. She verbalizes an understanding and agrees to management plan      F/U: 6 months

## 2022-12-11 ENCOUNTER — HOSPITAL ENCOUNTER (OUTPATIENT)
Facility: MEDICAL CENTER | Age: 67
End: 2022-12-11
Attending: NURSE PRACTITIONER
Payer: COMMERCIAL

## 2022-12-11 ENCOUNTER — OFFICE VISIT (OUTPATIENT)
Dept: URGENT CARE | Facility: CLINIC | Age: 67
End: 2022-12-11
Payer: COMMERCIAL

## 2022-12-11 VITALS
WEIGHT: 198 LBS | SYSTOLIC BLOOD PRESSURE: 146 MMHG | OXYGEN SATURATION: 98 % | DIASTOLIC BLOOD PRESSURE: 96 MMHG | HEART RATE: 69 BPM | BODY MASS INDEX: 35.08 KG/M2 | HEIGHT: 63 IN | TEMPERATURE: 97.1 F | RESPIRATION RATE: 16 BRPM

## 2022-12-11 DIAGNOSIS — R30.9 PAIN WITH URINATION: ICD-10-CM

## 2022-12-11 DIAGNOSIS — R30.0 DYSURIA: ICD-10-CM

## 2022-12-11 DIAGNOSIS — N30.01 ACUTE CYSTITIS WITH HEMATURIA: ICD-10-CM

## 2022-12-11 DIAGNOSIS — R35.0 URINARY FREQUENCY: ICD-10-CM

## 2022-12-11 LAB
AMBIGUOUS DTTM AMBI4: NORMAL
APPEARANCE UR: CLEAR
BILIRUB UR STRIP-MCNC: NEGATIVE MG/DL
COLOR UR AUTO: YELLOW
GLUCOSE UR STRIP.AUTO-MCNC: NEGATIVE MG/DL
KETONES UR STRIP.AUTO-MCNC: NEGATIVE MG/DL
LEUKOCYTE ESTERASE UR QL STRIP.AUTO: NORMAL
NITRITE UR QL STRIP.AUTO: NEGATIVE
PH UR STRIP.AUTO: 5.5 [PH] (ref 5–8)
PROT UR QL STRIP: NEGATIVE MG/DL
RBC UR QL AUTO: NORMAL
SP GR UR STRIP.AUTO: 1
UROBILINOGEN UR STRIP-MCNC: 0.2 MG/DL

## 2022-12-11 PROCEDURE — 99214 OFFICE O/P EST MOD 30 MIN: CPT | Performed by: NURSE PRACTITIONER

## 2022-12-11 PROCEDURE — 87077 CULTURE AEROBIC IDENTIFY: CPT

## 2022-12-11 PROCEDURE — 87086 URINE CULTURE/COLONY COUNT: CPT

## 2022-12-11 PROCEDURE — 87186 SC STD MICRODIL/AGAR DIL: CPT

## 2022-12-11 PROCEDURE — 81002 URINALYSIS NONAUTO W/O SCOPE: CPT | Performed by: NURSE PRACTITIONER

## 2022-12-11 RX ORDER — NITROFURANTOIN 25; 75 MG/1; MG/1
100 CAPSULE ORAL EVERY 12 HOURS
Qty: 10 CAPSULE | Refills: 0 | Status: SHIPPED | OUTPATIENT
Start: 2022-12-11 | End: 2022-12-16

## 2022-12-11 ASSESSMENT — FIBROSIS 4 INDEX: FIB4 SCORE: 1.27

## 2022-12-11 NOTE — PROGRESS NOTES
"Subjective:   Nila Villatoro is a 67 y.o. female who presents for UTI (Started 12/8/22, painful when urinating )       HPI  Patient presents for evaluation of 3 to 4-day history of urinary frequency, hesitancy, and pain.  Patient has tried increasing fluids without noticing relief of her symptoms.  Patient recently had LEEP procedure on 11/28, subsequent intermittent vaginal discharge.  Patient has seen GYN since that time, was cleared.  Patient denies fever, chills, flank pain.    ROS  All other systems are negative except as documented above within HPI.    MEDS:   Current Outpatient Medications:     nitrofurantoin (MACROBID) 100 MG Cap, Take 1 Capsule by mouth every 12 hours for 5 days., Disp: 10 Capsule, Rfl: 0    Nirmatrelvir&Ritonavir 300/100 20 x 150 MG & 10 x 100MG Tablet Therapy Pack, Take 300 mg nirmatrelvir (two 150 mg tablets) with 100 mg ritonavir (one 100 mg tablet) by mouth, with all three tablets taken together twice daily for 5 days., Disp: 30 Each, Rfl: 0    ROPINIRole (REQUIP) 0.5 MG Tab, PLEASE SEE ATTACHED FOR DETAILED DIRECTIONS, Disp: , Rfl:     atorvastatin (LIPITOR) 20 MG Tab, TOME BRANDY TABLETA TODOS LOS WHITFIELD EN LA NOCHE, Disp: 90 Tablet, Rfl: 1    budesonide-formoterol (SYMBICORT) 160-4.5 MCG/ACT Aerosol, Inhale 2 Puffs 2 times a day., Disp: 10 g, Rfl: 3    gabapentin (NEURONTIN) 800 MG tablet, TOME BRANDY TABLETA JAYJAY VECES AL D A SEG N LO INDICADO FOR 30 DAYS, Disp: , Rfl:     metoprolol SR (TOPROL XL) 25 MG TABLET SR 24 HR, Take 1 Tablet by mouth every evening., Disp: 90 Tablet, Rfl: 1  ALLERGIES:   Allergies   Allergen Reactions    Penicillins Hives     4/2022: tolerated cefazolin       Patient's PMH, SocHx, SurgHx, FamHx, Drug allergies and medications were reviewed.     Objective:   BP (!) 146/96 (BP Location: Left arm, Patient Position: Sitting, BP Cuff Size: Large adult)   Pulse 69   Temp 36.2 °C (97.1 °F)   Resp 16   Ht 1.6 m (5' 3\")   Wt 89.8 kg (198 lb)   LMP  (LMP " Unknown)   SpO2 98%   BMI 35.07 kg/m²     Physical Exam  Vitals and nursing note reviewed.   Constitutional:       General: She is awake.      Appearance: Normal appearance. She is well-developed.   HENT:      Head: Normocephalic and atraumatic.      Right Ear: External ear normal.      Left Ear: External ear normal.      Nose: Nose normal.      Mouth/Throat:      Mouth: Mucous membranes are moist.      Pharynx: Oropharynx is clear.   Eyes:      Extraocular Movements: Extraocular movements intact.      Conjunctiva/sclera: Conjunctivae normal.   Cardiovascular:      Rate and Rhythm: Normal rate and regular rhythm.      Heart sounds: Normal heart sounds.   Pulmonary:      Effort: Pulmonary effort is normal.      Breath sounds: Normal breath sounds.   Abdominal:      General: Bowel sounds are normal.      Palpations: Abdomen is soft.      Tenderness: There is abdominal tenderness in the suprapubic area. There is no right CVA tenderness or left CVA tenderness.   Musculoskeletal:         General: Normal range of motion.      Cervical back: Normal range of motion and neck supple.   Skin:     General: Skin is warm and dry.   Neurological:      General: No focal deficit present.      Mental Status: She is alert and oriented to person, place, and time.   Psychiatric:         Mood and Affect: Mood normal.         Behavior: Behavior normal. Behavior is cooperative.         Thought Content: Thought content normal.         Judgment: Judgment normal.       Assessment/Plan:   Assessment      1. Acute cystitis with hematuria  - POCT Urinalysis  - Urine Culture; Future  - nitrofurantoin (MACROBID) 100 MG Cap; Take 1 Capsule by mouth every 12 hours for 5 days.  Dispense: 10 Capsule; Refill: 0    2. Dysuria  - POCT Urinalysis  - Urine Culture; Future    3. Pain with urination  - POCT Urinalysis  - Urine Culture; Future    4. Urinary frequency  - POCT Urinalysis  - Urine Culture; Future        Vital signs stable at today's acute  urgent care visit.  Review of any test results completed in clinic.  We will send urine for culture, and treat accordingly if needed post visit.      Advised the patient to follow-up with the primary care provider/urgent care if symptoms persist.  Red flags discussed and indications to immediately call 911 or present to the ED. All questions were encouraged and answered to the patient's satisfaction and understanding, and they agree to the plan of care.     This is an acute problem with uncertain prognosis, medication management and instructions as well as management options were provided.  I personally reviewed prior external notes and test results pertinent to today and independently reviewed and interpreted all diagnostics. Time spent evaluating this patient includes preparing for visit, counseling/education, exam, evaluation, obtaining history, and ordering lab/test/procedures.      Please note that this dictation was created using voice recognition software. I have made a reasonable attempt to correct obvious errors, but I expect that there are errors of grammar and possibly content that I did not discover before finalizing the note.

## 2022-12-29 ENCOUNTER — OFFICE VISIT (OUTPATIENT)
Dept: MEDICAL GROUP | Facility: PHYSICIAN GROUP | Age: 67
End: 2022-12-29
Payer: COMMERCIAL

## 2022-12-29 VITALS
BODY MASS INDEX: 34.55 KG/M2 | SYSTOLIC BLOOD PRESSURE: 118 MMHG | OXYGEN SATURATION: 92 % | HEART RATE: 88 BPM | HEIGHT: 63 IN | WEIGHT: 195 LBS | DIASTOLIC BLOOD PRESSURE: 68 MMHG | TEMPERATURE: 97.8 F

## 2022-12-29 DIAGNOSIS — N87.0 CIN I (CERVICAL INTRAEPITHELIAL NEOPLASIA I): ICD-10-CM

## 2022-12-29 DIAGNOSIS — G25.81 RLS (RESTLESS LEGS SYNDROME): ICD-10-CM

## 2022-12-29 DIAGNOSIS — I10 ESSENTIAL HYPERTENSION: ICD-10-CM

## 2022-12-29 PROCEDURE — 99214 OFFICE O/P EST MOD 30 MIN: CPT | Performed by: FAMILY MEDICINE

## 2022-12-29 RX ORDER — BENZONATATE 100 MG/1
100 CAPSULE ORAL 3 TIMES DAILY PRN
Qty: 60 CAPSULE | Refills: 0 | Status: SHIPPED | OUTPATIENT
Start: 2022-12-29 | End: 2023-04-12

## 2022-12-29 RX ORDER — ROPINIROLE 0.5 MG/1
0.5 TABLET, FILM COATED ORAL
Qty: 90 TABLET | Refills: 1 | Status: SHIPPED | OUTPATIENT
Start: 2022-12-29 | End: 2023-03-31

## 2022-12-29 ASSESSMENT — FIBROSIS 4 INDEX: FIB4 SCORE: 1.27

## 2022-12-29 NOTE — ASSESSMENT & PLAN NOTE
Chronic issue  The patient has hx of this and is on Requip  Prescribed by neurology  Requesting refill

## 2022-12-30 NOTE — PROGRESS NOTES
"Subjective:     Chief Complaint   Patient presents with    Follow-Up     Routine/ results Abnormal Pap    Medication Refill     Requip       HPI:   Nila presents today to discuss the following.    JOEY I (cervical intraepithelial neoplasia I)  Chronic issue  S/p LEEP by GYN  Positive for this    RLS (restless legs syndrome)  Chronic issue  The patient has hx of this and is on Requip  Prescribed by neurology  Requesting refill     Past Medical History:   Diagnosis Date    Gastritis     High cholesterol     Hyperlipidemia     Hypertension     Pain     BACK PAIN    PONV (postoperative nausea and vomiting)        Current Outpatient Medications Ordered in Epic   Medication Sig Dispense Refill    benzonatate (TESSALON) 100 MG Cap Take 1 Capsule by mouth 3 times a day as needed for Cough. 60 Capsule 0    ROPINIRole (REQUIP) 0.5 MG Tab Take 1 Tablet by mouth at bedtime. 90 Tablet 1    atorvastatin (LIPITOR) 20 MG Tab TOME BRANDY TABLETA TODOS LOS WHITFIELD EN LA NOCHE 90 Tablet 1    budesonide-formoterol (SYMBICORT) 160-4.5 MCG/ACT Aerosol Inhale 2 Puffs 2 times a day. 10 g 3    gabapentin (NEURONTIN) 800 MG tablet TOME BRANDY TABLETA JAYJAY VECES AL D A SEG N LO INDICADO FOR 30 DAYS      metoprolol SR (TOPROL XL) 25 MG TABLET SR 24 HR Take 1 Tablet by mouth every evening. 90 Tablet 1     No current Epic-ordered facility-administered medications on file.       Allergies:  Penicillins    Health Maintenance: Completed    ROS:  Gen: no fevers/chills, no changes in weight  Eyes: no changes in vision  Pulm: no sob, no cough  CV: no chest pain, no palpitations  GI: no nausea/vomiting, no diarrhea      Objective:     Exam:  /68 (BP Location: Left arm, Patient Position: Sitting, BP Cuff Size: Adult)   Pulse 88   Temp 36.6 °C (97.8 °F) (Temporal)   Ht 1.6 m (5' 3\")   Wt 88.5 kg (195 lb)   LMP  (LMP Unknown)   SpO2 92%   BMI 34.54 kg/m²  Body mass index is 34.54 kg/m².        Constitutional: Alert, no distress, well-groomed.  Skin: " Warm, dry, good turgor, no rashes in visible areas.  Eye: Equal, round and reactive, conjunctiva clear, lids normal.  ENMT: Lips without lesions, good dentition, moist mucous membranes.  Neck: Trachea midline, no masses, no thyromegaly.  Respiratory: Unlabored respiratory effort, no cough.  MSK: Normal gait, moves all extremities.  Neuro: Grossly non-focal.   Psych: Alert and oriented x3, normal affect and mood.        Assessment & Plan:     67 y.o. female with the following -     1. JOEY I (cervical intraepithelial neoplasia I)  New problem.  Concerning for dysplasia based on pathology results.  Status post LEEP procedure.  I educated the patient about this diagnosis and I urged the patient to continue to follow-up with gynecology.  She verbalized understanding.    2. RLS (restless legs syndrome)  Chronic, stable condition.  Continue with Requip.      Return in about 6 months (around 6/29/2023).          Please note that this dictation was created using voice recognition software. I have made every reasonable attempt to correct obvious errors, but I expect that there are errors of grammar and possibly content that I did not discover before finalizing the note.

## 2022-12-31 RX ORDER — METOPROLOL SUCCINATE 25 MG/1
TABLET, EXTENDED RELEASE ORAL
Qty: 90 TABLET | Refills: 1 | Status: SHIPPED | OUTPATIENT
Start: 2022-12-31 | End: 2023-05-30

## 2023-01-11 ENCOUNTER — OFFICE VISIT (OUTPATIENT)
Dept: URGENT CARE | Facility: PHYSICIAN GROUP | Age: 68
End: 2023-01-11
Payer: COMMERCIAL

## 2023-01-11 ENCOUNTER — HOSPITAL ENCOUNTER (OUTPATIENT)
Facility: MEDICAL CENTER | Age: 68
End: 2023-01-11
Payer: COMMERCIAL

## 2023-01-11 VITALS
TEMPERATURE: 97.9 F | HEIGHT: 63 IN | RESPIRATION RATE: 16 BRPM | DIASTOLIC BLOOD PRESSURE: 66 MMHG | WEIGHT: 195 LBS | HEART RATE: 61 BPM | OXYGEN SATURATION: 99 % | BODY MASS INDEX: 34.55 KG/M2 | SYSTOLIC BLOOD PRESSURE: 114 MMHG

## 2023-01-11 DIAGNOSIS — N30.01 ACUTE CYSTITIS WITH HEMATURIA: ICD-10-CM

## 2023-01-11 LAB
APPEARANCE UR: NORMAL
BILIRUB UR STRIP-MCNC: NEGATIVE MG/DL
COLOR UR AUTO: NORMAL
GLUCOSE UR STRIP.AUTO-MCNC: NEGATIVE MG/DL
KETONES UR STRIP.AUTO-MCNC: NORMAL MG/DL
LEUKOCYTE ESTERASE UR QL STRIP.AUTO: NORMAL
NITRITE UR QL STRIP.AUTO: NEGATIVE
PH UR STRIP.AUTO: 5.5 [PH] (ref 5–8)
PROT UR QL STRIP: NEGATIVE MG/DL
RBC UR QL AUTO: NORMAL
SP GR UR STRIP.AUTO: 1.02
UROBILINOGEN UR STRIP-MCNC: 0.2 MG/DL

## 2023-01-11 PROCEDURE — 87086 URINE CULTURE/COLONY COUNT: CPT

## 2023-01-11 PROCEDURE — 87186 SC STD MICRODIL/AGAR DIL: CPT

## 2023-01-11 PROCEDURE — 99213 OFFICE O/P EST LOW 20 MIN: CPT

## 2023-01-11 PROCEDURE — 87077 CULTURE AEROBIC IDENTIFY: CPT

## 2023-01-11 PROCEDURE — 81002 URINALYSIS NONAUTO W/O SCOPE: CPT

## 2023-01-11 RX ORDER — SULFAMETHOXAZOLE AND TRIMETHOPRIM 800; 160 MG/1; MG/1
1 TABLET ORAL 2 TIMES DAILY
Qty: 6 TABLET | Refills: 0 | Status: SHIPPED | OUTPATIENT
Start: 2023-01-11 | End: 2023-01-14

## 2023-01-11 ASSESSMENT — ENCOUNTER SYMPTOMS
NAUSEA: 0
VOMITING: 0
FEVER: 0
FLANK PAIN: 0
ABDOMINAL PAIN: 0
BACK PAIN: 0

## 2023-01-11 ASSESSMENT — FIBROSIS 4 INDEX: FIB4 SCORE: 1.27

## 2023-01-11 NOTE — PROGRESS NOTES
Subjective:   Nila Villatoro is a 67 y.o. female who presents for UTI (Burning, lower abdominal pains, x off and on for 3 weeks)      HPI: This is a 67-year-old female presents today for UTI symptoms.  Patient reports developing urinary frequency, pelvic pressure, and burning with urination over the last 3 weeks.  She has tried increasing her oral hydration to include cranberry juice and taking over-the-counter AZO which has been somewhat helpful in relieving symptoms.  Patient denies fevers, low back pain, flank pain, abnormal vaginal discharge.      Review of Systems   Constitutional:  Negative for fever.   Gastrointestinal:  Negative for abdominal pain, nausea and vomiting.   Genitourinary:  Positive for dysuria and frequency. Negative for flank pain, hematuria and urgency.        + Suprapubic tenderness   Musculoskeletal:  Negative for back pain.     Medications:    Current Outpatient Medications on File Prior to Visit   Medication Sig Dispense Refill    metoprolol SR (TOPROL XL) 25 MG TABLET SR 24 HR TOME BRANDY TABLETA TODOS LOS WHITFIELD EN LA NOCHE 90 Tablet 1    ROPINIRole (REQUIP) 0.5 MG Tab Take 1 Tablet by mouth at bedtime. 90 Tablet 1    atorvastatin (LIPITOR) 20 MG Tab TOME BRANDY TABLETA TODOS LOS WHITFIELD EN LA NOCHE 90 Tablet 1    budesonide-formoterol (SYMBICORT) 160-4.5 MCG/ACT Aerosol Inhale 2 Puffs 2 times a day. 10 g 3    gabapentin (NEURONTIN) 800 MG tablet TOME BRANDY TABLETA JAYJAY VECES AL D A SEG N LO INDICADO FOR 30 DAYS      benzonatate (TESSALON) 100 MG Cap Take 1 Capsule by mouth 3 times a day as needed for Cough. 60 Capsule 0     No current facility-administered medications on file prior to visit.        Allergies:   Penicillins    Problem List:   Patient Active Problem List   Diagnosis    Heart palpitations    Gastroesophageal reflux disease without esophagitis    Dyslipidemia    Mammographic microcalcification on right breast and biopsy on 4/1/16 ruled out malignancy     Venous  "insufficiency    Pelvic pain in female    Epigastric pain    Cervical high risk HPV (human papillomavirus) test positive    Hypertension    Acute pain of right knee    Acute left-sided low back pain with sciatica    Herniation of left side of L4-L5 intervertebral disc    SOB (shortness of breath)    Functional diarrhea    JOEY I (cervical intraepithelial neoplasia I)    RLS (restless legs syndrome)        Surgical History:  Past Surgical History:   Procedure Laterality Date    LEEP N/A 11/2022    LUMBAR LAMINECTOMY DISKECTOMY  04/26/2022    Procedure: LAMINECTOMY, SPINE, LUMBAR, WITHL DISCECTOMY - L5 WITH L4-S1 MEDIAL FACETECTOMY;  Surgeon: Duane Weiss M.D.;  Location: SURGERY [facility];  Service: Neurosurgery    FORAMINOTOMY  04/26/2022    Procedure: FORAMINOTOMY, SPINE;  Surgeon: Duane Weiss M.D.;  Location: SURGERY [facility];  Service: Neurosurgery    CHOLECYSTECTOMY  01/01/2009    BREAST BIOPSY      PRIMARY C SECTION      3 times, last one in 1986    US-NEEDLE CORE BX-BREAST PANEL Left        Past Social Hx:   Social History     Tobacco Use    Smoking status: Never    Smokeless tobacco: Never   Vaping Use    Vaping Use: Never used   Substance Use Topics    Alcohol use: Not Currently    Drug use: No          Problem list, medications, and allergies reviewed by myself today in Epic.     Objective:     /66   Pulse 61   Temp 36.6 °C (97.9 °F)   Resp 16   Ht 1.6 m (5' 3\")   Wt 88.5 kg (195 lb)   LMP  (LMP Unknown)   SpO2 99%   BMI 34.54 kg/m²     Physical Exam  Vitals and nursing note reviewed.   Constitutional:       General: She is not in acute distress.     Appearance: Normal appearance. She is normal weight. She is not ill-appearing or toxic-appearing.   HENT:      Head: Normocephalic and atraumatic.   Cardiovascular:      Rate and Rhythm: Normal rate and regular rhythm.      Pulses: Normal pulses.      Heart sounds: Normal heart sounds. No murmur heard.    No friction rub. No gallop. "   Pulmonary:      Effort: Pulmonary effort is normal. No respiratory distress.      Breath sounds: Normal breath sounds. No stridor. No wheezing, rhonchi or rales.   Chest:      Chest wall: No tenderness.   Abdominal:      Tenderness: There is no right CVA tenderness or left CVA tenderness.   Skin:     General: Skin is warm and dry.      Capillary Refill: Capillary refill takes less than 2 seconds.   Neurological:      General: No focal deficit present.      Mental Status: She is alert and oriented to person, place, and time. Mental status is at baseline.      Cranial Nerves: No cranial nerve deficit.      Motor: No weakness.      Gait: Gait normal.   Psychiatric:         Mood and Affect: Mood normal.         Behavior: Behavior normal.         Thought Content: Thought content normal.         Judgment: Judgment normal.       Assessment/Plan:     Diagnosis and associated orders:   1. Acute cystitis with hematuria  sulfamethoxazole-trimethoprim (BACTRIM DS) 800-160 MG tablet    POCT Urinalysis    URINE CULTURE(NEW)             Comments/MDM:   Pt is clinically stable at today's acute urgent care visit.  No acute distress noted. Appropriate for outpatient management at this time.     Acute problem.  POC UA positive for moderate blood and moderate leukocytes consistent with acute cystitis.  Patient will be treated with Bactrim DS twice daily x3 days.  Urine will be sent for culture, will follow.  Advised patient begin taking antibiotics as prescribed, increase oral hydration, proper wiping from front to back.  She is to return to  for any new or worsening signs or symptoms and follow-up with PCP for recheck.  Patient is agreeable plan of care verbalizes good understanding today.           Discussed DDx, management options (risks,benefits, and alternatives to planned treatment), natural progression and supportive care.  Expressed understanding and the treatment plan was agreed upon. Questions were encouraged and answered    Return to urgent care prn if new or worsening sx or if there is no improvement in condition prn.    Educated in Red flags and indications to immediately call 911 or present to the Emergency Department.   Advised the patient to follow-up with the primary care physician for recheck, reevaluation, and consideration of further management.    I personally reviewed prior external notes and test results pertinent to today's visit.  I have independently reviewed and interpreted all diagnostics ordered during this urgent care acute visit.       Please note that this dictation was created using voice recognition software. I have made a reasonable attempt to correct obvious errors, but I expect that there are errors of grammar and possibly content that I did not discover before finalizing the note.    This note was electronically signed by CARTER Jimenez

## 2023-01-27 ENCOUNTER — TELEPHONE (OUTPATIENT)
Dept: MEDICAL GROUP | Facility: PHYSICIAN GROUP | Age: 68
End: 2023-01-27
Payer: COMMERCIAL

## 2023-01-27 RX ORDER — BECLOMETHASONE DIPROPIONATE HFA 40 UG/1
1 AEROSOL, METERED RESPIRATORY (INHALATION) 2 TIMES DAILY
Qty: 10.6 G | Refills: 3 | Status: SHIPPED | OUTPATIENT
Start: 2023-01-27 | End: 2023-11-15

## 2023-01-27 NOTE — TELEPHONE ENCOUNTER
MEDICATION PRIOR AUTHORIZATION NEEDED:    1. Name of Medication: budesonide-formoterol fumarate    2. Requested By (Name of Pharmacy): [facility]     3. Is insurance on file current? yes    4. What is the name & phone number of the 3rd party payor? Lyn STEPHEN or Alternative?  Preferred alternative list scanned into media      Key: [id number]  Last Name: Marcos  : 1955

## 2023-04-12 ENCOUNTER — OFFICE VISIT (OUTPATIENT)
Dept: URGENT CARE | Facility: PHYSICIAN GROUP | Age: 68
End: 2023-04-12
Payer: COMMERCIAL

## 2023-04-12 VITALS
SYSTOLIC BLOOD PRESSURE: 132 MMHG | HEART RATE: 70 BPM | DIASTOLIC BLOOD PRESSURE: 82 MMHG | BODY MASS INDEX: 34.55 KG/M2 | HEIGHT: 63 IN | TEMPERATURE: 97.7 F | RESPIRATION RATE: 16 BRPM | OXYGEN SATURATION: 97 % | WEIGHT: 195 LBS

## 2023-04-12 DIAGNOSIS — J32.9 BACTERIAL SINUSITIS: ICD-10-CM

## 2023-04-12 DIAGNOSIS — B96.89 BACTERIAL SINUSITIS: ICD-10-CM

## 2023-04-12 PROCEDURE — 99213 OFFICE O/P EST LOW 20 MIN: CPT | Performed by: FAMILY MEDICINE

## 2023-04-12 RX ORDER — DOXYCYCLINE HYCLATE 100 MG
100 TABLET ORAL 2 TIMES DAILY
Qty: 10 TABLET | Refills: 0 | Status: SHIPPED | OUTPATIENT
Start: 2023-04-12 | End: 2023-04-17

## 2023-04-12 ASSESSMENT — FIBROSIS 4 INDEX: FIB4 SCORE: 1.27

## 2023-04-12 NOTE — PROGRESS NOTES
"  Subjective:      67 y.o. female presents to urgent care for cold symptoms that started 10 days ago.  She is experiencing increased sinus pressure, headache, cough, sore throat, and right ear pain.  No body aches or diarrhea.  She denies any tobacco product use.  Endorses asthma since having COVID.  She uses albuterol approximately once per day, this is not changed since getting sick.  She is fully vaccine against COVID.  No known sick contacts.    She denies any other questions or concerns at this time.    Current problem list, medication, and past medical/surgical history were reviewed in Epic.    ROS  See HPI     Objective:      /82   Pulse 70   Temp 36.5 °C (97.7 °F)   Resp 16   Ht 1.6 m (5' 3\")   Wt 88.5 kg (195 lb)   LMP  (LMP Unknown)   SpO2 97%   BMI 34.54 kg/m²     Physical Exam  Constitutional:       General: She is not in acute distress.     Appearance: She is not diaphoretic.   HENT:      Right Ear: Tympanic membrane, ear canal and external ear normal.      Left Ear: Tympanic membrane, ear canal and external ear normal.      Nose:      Right Sinus: Maxillary sinus tenderness and frontal sinus tenderness present.      Left Sinus: No maxillary sinus tenderness or frontal sinus tenderness.      Mouth/Throat:      Tongue: Tongue does not deviate from midline.      Palate: No lesions.      Pharynx: Uvula midline. No posterior oropharyngeal erythema.      Tonsils: No tonsillar exudate. 1+ on the right. 1+ on the left.   Cardiovascular:      Rate and Rhythm: Normal rate and regular rhythm.      Heart sounds: Normal heart sounds.   Pulmonary:      Effort: Pulmonary effort is normal. No respiratory distress.      Breath sounds: Normal breath sounds.   Neurological:      Mental Status: She is alert.   Psychiatric:         Mood and Affect: Affect normal.         Judgment: Judgment normal.     Assessment/Plan:     1. Bacterial sinusitis  Criteria for bacterial sinusitis has been met.  She is " penicillin allergic, prescription for doxycycline has been sent.  Tylenol, ibuprofen, and Flonase as needed for symptomatic relief.  - doxycycline (VIBRAMYCIN) 100 MG Tab; Take 1 Tablet by mouth 2 times a day for 5 days.  Dispense: 10 Tablet; Refill: 0      Instructed to return to Urgent Care or nearest Emergency Department if symptoms fail to improve, for any change in condition, further concerns, or new concerning symptoms. Patient states understanding of the plan of care and discharge instructions.    Jacquelyn Chavez M.D.

## 2023-04-29 ENCOUNTER — APPOINTMENT (OUTPATIENT)
Dept: RADIOLOGY | Facility: MEDICAL CENTER | Age: 68
End: 2023-04-29
Attending: EMERGENCY MEDICINE
Payer: COMMERCIAL

## 2023-04-29 ENCOUNTER — HOSPITAL ENCOUNTER (EMERGENCY)
Facility: MEDICAL CENTER | Age: 68
End: 2023-04-30
Attending: EMERGENCY MEDICINE
Payer: COMMERCIAL

## 2023-04-29 ENCOUNTER — OFFICE VISIT (OUTPATIENT)
Dept: URGENT CARE | Facility: PHYSICIAN GROUP | Age: 68
End: 2023-04-29
Payer: COMMERCIAL

## 2023-04-29 ENCOUNTER — HOSPITAL ENCOUNTER (OUTPATIENT)
Facility: MEDICAL CENTER | Age: 68
End: 2023-04-29
Attending: PHYSICIAN ASSISTANT
Payer: COMMERCIAL

## 2023-04-29 VITALS
TEMPERATURE: 97.3 F | RESPIRATION RATE: 16 BRPM | OXYGEN SATURATION: 96 % | SYSTOLIC BLOOD PRESSURE: 120 MMHG | WEIGHT: 211.64 LBS | BODY MASS INDEX: 38.95 KG/M2 | HEIGHT: 62 IN | DIASTOLIC BLOOD PRESSURE: 70 MMHG | HEART RATE: 76 BPM

## 2023-04-29 VITALS
RESPIRATION RATE: 16 BRPM | SYSTOLIC BLOOD PRESSURE: 163 MMHG | DIASTOLIC BLOOD PRESSURE: 81 MMHG | HEART RATE: 60 BPM | TEMPERATURE: 97.9 F | HEIGHT: 63 IN | WEIGHT: 192 LBS | BODY MASS INDEX: 34.02 KG/M2 | OXYGEN SATURATION: 96 %

## 2023-04-29 DIAGNOSIS — M79.661 PAIN OF RIGHT LOWER LEG: ICD-10-CM

## 2023-04-29 DIAGNOSIS — N30.01 ACUTE CYSTITIS WITH HEMATURIA: ICD-10-CM

## 2023-04-29 DIAGNOSIS — M54.41 ACUTE BILATERAL LOW BACK PAIN WITH BILATERAL SCIATICA: ICD-10-CM

## 2023-04-29 DIAGNOSIS — M54.42 ACUTE BILATERAL LOW BACK PAIN WITH BILATERAL SCIATICA: ICD-10-CM

## 2023-04-29 LAB
APPEARANCE UR: NORMAL
BILIRUB UR STRIP-MCNC: NEGATIVE MG/DL
COLOR UR AUTO: YELLOW
GLUCOSE UR STRIP.AUTO-MCNC: NEGATIVE MG/DL
KETONES UR STRIP.AUTO-MCNC: NEGATIVE MG/DL
LEUKOCYTE ESTERASE UR QL STRIP.AUTO: NORMAL
NITRITE UR QL STRIP.AUTO: NEGATIVE
PH UR STRIP.AUTO: 6 [PH] (ref 5–8)
PROT UR QL STRIP: NEGATIVE MG/DL
RBC UR QL AUTO: NORMAL
SP GR UR STRIP.AUTO: 1.02
UROBILINOGEN UR STRIP-MCNC: 0.2 MG/DL

## 2023-04-29 PROCEDURE — A9270 NON-COVERED ITEM OR SERVICE: HCPCS | Performed by: EMERGENCY MEDICINE

## 2023-04-29 PROCEDURE — 99284 EMERGENCY DEPT VISIT MOD MDM: CPT

## 2023-04-29 PROCEDURE — 87077 CULTURE AEROBIC IDENTIFY: CPT

## 2023-04-29 PROCEDURE — 99214 OFFICE O/P EST MOD 30 MIN: CPT | Performed by: PHYSICIAN ASSISTANT

## 2023-04-29 PROCEDURE — 87086 URINE CULTURE/COLONY COUNT: CPT

## 2023-04-29 PROCEDURE — 81002 URINALYSIS NONAUTO W/O SCOPE: CPT | Performed by: PHYSICIAN ASSISTANT

## 2023-04-29 PROCEDURE — 87186 SC STD MICRODIL/AGAR DIL: CPT

## 2023-04-29 PROCEDURE — 700102 HCHG RX REV CODE 250 W/ 637 OVERRIDE(OP): Performed by: EMERGENCY MEDICINE

## 2023-04-29 PROCEDURE — 72100 X-RAY EXAM L-S SPINE 2/3 VWS: CPT

## 2023-04-29 RX ORDER — HYDROCODONE BITARTRATE AND ACETAMINOPHEN 5; 325 MG/1; MG/1
1 TABLET ORAL ONCE
Status: COMPLETED | OUTPATIENT
Start: 2023-04-29 | End: 2023-04-29

## 2023-04-29 RX ORDER — SULFAMETHOXAZOLE AND TRIMETHOPRIM 800; 160 MG/1; MG/1
1 TABLET ORAL 2 TIMES DAILY
Qty: 10 TABLET | Refills: 0 | Status: SHIPPED | OUTPATIENT
Start: 2023-04-29 | End: 2023-05-04

## 2023-04-29 RX ADMIN — HYDROCODONE BITARTRATE AND ACETAMINOPHEN 1 TABLET: 5; 325 TABLET ORAL at 23:15

## 2023-04-29 ASSESSMENT — FIBROSIS 4 INDEX
FIB4 SCORE: 1.27
FIB4 SCORE: 1.27

## 2023-04-29 ASSESSMENT — ENCOUNTER SYMPTOMS
CHILLS: 0
FEVER: 0
FLANK PAIN: 0

## 2023-04-29 ASSESSMENT — PAIN DESCRIPTION - PAIN TYPE: TYPE: ACUTE PAIN

## 2023-04-29 NOTE — PROGRESS NOTES
Subjective:   Nila Villatoro is a 67 y.o. female who presents today with   Chief Complaint   Patient presents with    UTI     X 10 days uti       UTI  This is a new problem. Episode onset: 10 days. The problem occurs constantly. The problem has been unchanged. Associated symptoms include urinary symptoms. Pertinent negatives include no chills or fever. She has tried nothing for the symptoms. The treatment provided no relief.   Patient had similar occurrence in January with urine culture completed at that time which she was treated appropriately with Bactrim.    PMH:  has a past medical history of Gastritis, High cholesterol, Hyperlipidemia, Hypertension, Pain, and PONV (postoperative nausea and vomiting).  MEDS:   Current Outpatient Medications:     sulfamethoxazole-trimethoprim (BACTRIM DS) 800-160 MG tablet, Take 1 Tablet by mouth 2 times a day for 5 days., Disp: 10 Tablet, Rfl: 0    ROPINIRole (REQUIP) 0.5 MG Tab, TOME BRANDY TABLETA TODOS [place] AL ACOSTARSE, Disp: 90 Tablet, Rfl: 0    beclomethasone HFA (QVAR REDIHALER) 40 MCG/ACT inhaler, Inhale 1 Puff 2 times a day., Disp: 10.6 g, Rfl: 3    metoprolol SR (TOPROL XL) 25 MG TABLET SR 24 HR, TOME BRANDY TABLETA TODOS [place] EN LA NOCHE, Disp: 90 Tablet, Rfl: 1    atorvastatin (LIPITOR) 20 MG Tab, TOME BRANDY TABLETA TODOS [place] EN LA NOCHE, Disp: 90 Tablet, Rfl: 1    gabapentin (NEURONTIN) 800 MG tablet, TOME BRANDY TABLETA JAYJAY VECES AL D A SEG N LO INDICADO FOR 30 DAYS, Disp: , Rfl:   ALLERGIES:   Allergies   Allergen Reactions    Penicillins Hives     4/2022: tolerated cefazolin     SURGHX:   Past Surgical History:   Procedure Laterality Date    LEEP N/A 11/2022    LUMBAR LAMINECTOMY DISKECTOMY  04/26/2022    Procedure: LAMINECTOMY, SPINE, LUMBAR, WITHL DISCECTOMY - L5 WITH L4-S1 MEDIAL FACETECTOMY;  Surgeon: Duane Weiss M.D.;  Location: SURGERY [facility];  Service: Neurosurgery    FORAMINOTOMY  04/26/2022    Procedure: FORAMINOTOMY, SPINE;   "Surgeon: Duane Weiss M.D.;  Location: SURGERY [facility];  Service: Neurosurgery    CHOLECYSTECTOMY  01/01/2009    BREAST BIOPSY      PRIMARY C SECTION      3 times, last one in 1986    US-NEEDLE CORE BX-BREAST PANEL Left      SOCHX:  reports that she has never smoked. She has never used smokeless tobacco. She reports that she does not currently use alcohol. She reports that she does not use drugs.  FH: Reviewed with patient, not pertinent to this visit.       Review of Systems   Constitutional:  Negative for chills and fever.   Genitourinary:  Positive for dysuria, frequency and urgency. Negative for flank pain and hematuria.      Objective:   /70   Pulse 76   Temp 36.3 °C (97.3 °F) (Temporal)   Resp 16   Ht 1.575 m (5' 2\")   Wt 96 kg (211 lb 10.3 oz)   LMP  (LMP Unknown)   SpO2 96%   BMI 38.71 kg/m²   Physical Exam  Vitals and nursing note reviewed.   Constitutional:       General: She is not in acute distress.     Appearance: Normal appearance. She is well-developed. She is not ill-appearing or toxic-appearing.   HENT:      Head: Normocephalic and atraumatic.      Right Ear: Hearing normal.      Left Ear: Hearing normal.   Cardiovascular:      Rate and Rhythm: Normal rate and regular rhythm.      Heart sounds: Normal heart sounds.   Pulmonary:      Effort: Pulmonary effort is normal.      Breath sounds: Normal breath sounds.   Abdominal:      Tenderness: There is no abdominal tenderness. There is no right CVA tenderness or left CVA tenderness.   Genitourinary:     Comments: Patient deferred  exam  Musculoskeletal:      Comments: Normal movement in all 4 extremities   Skin:     General: Skin is warm and dry.   Neurological:      Mental Status: She is alert.      Coordination: Coordination normal.   Psychiatric:         Mood and Affect: Mood normal.     UA consistent with infection    Assessment/Plan:   Assessment    1. Acute cystitis with hematuria  - POCT Urinalysis  - " sulfamethoxazole-trimethoprim (BACTRIM DS) 800-160 MG tablet; Take 1 Tablet by mouth 2 times a day for 5 days.  Dispense: 10 Tablet; Refill: 0  - URINE CULTURE(NEW); Future    Symptoms and presentation are consistent with urinary tract infection we will treat accordingly with antibiotics based on previous urine culture and will also follow-up with updated urine culture today and adjust treatment accordingly if needed.    Differential diagnosis, natural history, supportive care, and indications for immediate follow-up discussed.   Patient given instructions and understanding of medications and treatment.    If not improving in 3-5 days, F/U with PCP or return to UC if symptoms worsen.    Patient agreeable to plan.      Please note that this dictation was created using voice recognition software. I have made every reasonable attempt to correct obvious errors, but I expect that there are errors of grammar and possibly content that I did not discover before finalizing the note.    Shay Gonzalez PA-C

## 2023-04-30 RX ORDER — PREDNISONE 20 MG/1
40 TABLET ORAL DAILY
Qty: 10 TABLET | Refills: 0 | Status: SHIPPED | OUTPATIENT
Start: 2023-04-30 | End: 2023-11-15

## 2023-04-30 NOTE — ED NOTES
Pt wheeled to room via wheelchair. Pt placed on monitor with call light in reach.  at bedside with patient. Chart up for next available ERP.

## 2023-04-30 NOTE — ED NOTES
Pt discharged to home. Discharge paperwork provided. Education provided by ERP.  Pt was given follow up instructions and prescriptions for prednisone. Pt verbalized understanding of all instructions for discharge. Patient wheeled via wheelchair,  alert and oriented x 4, out of ER with all belongings

## 2023-04-30 NOTE — ED TRIAGE NOTES
"Chief Complaint   Patient presents with    Leg Pain     Right leg pain since back surgery she had last year. Pt unable to bear weight due to pain.      Pt also c/o of leg tremors.   Pt to triage by WC. Pt educated to inform staff of any changes.    Language line: Juan C [id number]    BP (!) 177/90   Pulse 90   Temp 36.6 °C (97.8 °F) (Temporal)   Resp 18   Ht 1.6 m (5' 3\")   Wt 87.1 kg (192 lb)   LMP  (LMP Unknown)   SpO2 99%   BMI 34.01 kg/m²     "

## 2023-04-30 NOTE — ED PROVIDER NOTES
ED Provider Note    CHIEF COMPLAINT  Chief Complaint   Patient presents with    Leg Pain     Right leg pain since back surgery she had last year. Pt unable to bear weight due to pain.        EXTERNAL RECORDS REVIEWED  Outpatient Notes Patient with diagnosis of acute cystitis from urgent care from today.  With antibiotics.    HPI/ROS  LIMITATION TO HISTORY   Select: : None  OUTSIDE HISTORIAN(S):  Family corroborates story.    Nila Villatoro is a 67 y.o. female who presents with pain radiating from the right back down the right leg.  The patient has not had any difficulty with urination or loss of bladder or bowel function.  The patient has no numbness in her legs does feel shooting pain down the right leg.  After she takes pain medication she is able to ambulate and work without any significant difficulty but when the medication wears off that she is has significant difficulties.  She denies any IV drug use.  She had a remote surgery about a year ago which did not seem to help the pain.    PAST MEDICAL HISTORY   has a past medical history of Gastritis, High cholesterol, Hyperlipidemia, Hypertension, Pain, and PONV (postoperative nausea and vomiting).    SURGICAL HISTORY   has a past surgical history that includes breast biopsy; us-needle core bx-breast panel (Left); cholecystectomy (01/01/2009); primary c section; lumbar laminectomy diskectomy (04/26/2022); foraminotomy (04/26/2022); and leep (N/A, 11/2022).    FAMILY HISTORY  Family History   Problem Relation Age of Onset    Cancer Mother         Cervical Cancer    Heart Attack Father        SOCIAL HISTORY  Social History     Tobacco Use    Smoking status: Never    Smokeless tobacco: Never   Vaping Use    Vaping Use: Never used   Substance and Sexual Activity    Alcohol use: Not Currently    Drug use: No    Sexual activity: Not Currently     Partners: Male     Birth control/protection: Post-Menopausal       CURRENT MEDICATIONS  Home Medications        "Reviewed by Telma Whitlock R.N. (Registered Nurse) on 04/29/23 at 2125  Med List Status: Not Addressed     Medication Last Dose Status   atorvastatin (LIPITOR) 20 MG Tab  Active   beclomethasone HFA (QVAR REDIHALER) 40 MCG/ACT inhaler  Active   gabapentin (NEURONTIN) 800 MG tablet  Active   metoprolol SR (TOPROL XL) 25 MG TABLET SR 24 HR  Active   ROPINIRole (REQUIP) 0.5 MG Tab  Active   sulfamethoxazole-trimethoprim (BACTRIM DS) 800-160 MG tablet  Active                    ALLERGIES  Allergies   Allergen Reactions    Penicillins Hives     4/2022: tolerated cefazolin       PHYSICAL EXAM  VITAL SIGNS: BP (!) 163/81   Pulse 60   Temp 36.6 °C (97.9 °F) (Skin)   Resp 16   Ht 1.6 m (5' 3\")   Wt 87.1 kg (192 lb)   LMP  (LMP Unknown)   SpO2 96%   BMI 34.01 kg/m²      Constitutional: Well developed, Well nourished, No acute distress, Non-toxic appearance. Complaining of pain  Neck: Grossly normal range of motion  Cardiovascular: Normal heart rate   Thorax & Lungs: No respiratory distress  Abdomen: Bowel sounds normal, soft, non-distended, nontender, no masses.  Skin: Warm, Dry, No rash.   Back: Right-sided paraspinal tenderness with no midline tenderness.  Extremities: No clubbing, cyanosis, edema, no Homans or cords   Neurologic: Grossly normal cranial nerves, 5 out of 5 EHL, FHL, gastrocnemius, tibialis anterior. 2+ DTRs at the patellas bilaterally.  Normal sensory throughout.      DIAGNOSTIC STUDIES / PROCEDURES  EKG  I have independently interpreted this EKG  No ST-T wave changes and no ectopy with no Brugada syndrome or any hypertrophic cardiomyopathy and no preexcitation and normal intervals      LABS  Labs Reviewed - No data to display      RADIOLOGY  I have independently interpreted the diagnostic imaging associated with this visit and am waiting the final reading from the radiologist.   My preliminary interpretation is as follows: No obvious fractures noted  Radiologist interpretation:   DX-LUMBAR " SPINE-2 OR 3 VIEWS   Final Result      1.  Decreased osseous mineralization without evidence of displaced fracture.   2.  Lower lumbar spine degenerative changes.   3.  Minimal anterolisthesis of L5 on S1, presumably degenerative.            COURSE & MEDICAL DECISION MAKING    ED Observation Status? Yes; I am placing the patient in to an observation status due to a diagnostic uncertainty as well as therapeutic intensity. Patient placed in observation status at 10:17 PM, 4/29/2023.     Observation plan is as follows: Serial neurologic reassessments and evaluation after medication was given.    Upon Reevaluation, the patient's condition has: Improved; and will be discharged.    Patient discharged from ED Observation status at 0100 (Time) 4/30 (Date).     INITIAL ASSESSMENT, COURSE AND PLAN  Care Narrative: Patient with what appears to be sciatic pain.  There is no red flags at this time given there is no weakness noted on exam.  There is no midline tenderness.  We will get an x-ray to evaluate for any fracture.  At this time the patient may need further nerve root decompression and/or steroids related to this what appears to be sciatic pain.  We will reassess the patient after pain medication.  HTN/IDDM FOLLOW UP:  The patient is referred to a primary physician for blood pressure management, diabetic screening, and for all other preventive health concerns      ADDITIONAL PROBLEM LIST  Patient's pain is improved.  The patient was able to walk to the bathroom to help with urination.  The patient will be given a course of steroids to help with likely sciatica pain.  We will have the patient follow-up with her spine surgeon.  There is no indication for acute surgery at this time.  Strict return precautions were given.      DISPOSITION AND DISCUSSIONS  I have discussed management of the patient with the following physicians and IVANIA's:  none    Discussion of management with other QHP or appropriate source(s):  Blood.    Escalation of care considered, and ultimately not performed:diagnostic imaging and acute inpatient care management, however at this time, the patient is most appropriate for outpatient management    Barriers to care at this time, including but not limited to:  Patient need to be referral to neurosurgery. .     Decision tools and prescription drugs considered including, but not limited to: Pain Medications steroids given for back pain. .    FINAL DIAGNOSIS  1. Pain of right lower leg    2. Acute bilateral low back pain with bilateral sciatica           Electronically signed by: Yoav Davis M.D., 4/29/2023 10:17 PM

## 2023-08-21 ENCOUNTER — TELEPHONE (OUTPATIENT)
Dept: HEALTH INFORMATION MANAGEMENT | Facility: OTHER | Age: 68
End: 2023-08-21
Payer: COMMERCIAL

## 2023-11-14 SDOH — HEALTH STABILITY: PHYSICAL HEALTH: ON AVERAGE, HOW MANY MINUTES DO YOU ENGAGE IN EXERCISE AT THIS LEVEL?: 30 MIN

## 2023-11-14 SDOH — ECONOMIC STABILITY: TRANSPORTATION INSECURITY
IN THE PAST 12 MONTHS, HAS LACK OF RELIABLE TRANSPORTATION KEPT YOU FROM MEDICAL APPOINTMENTS, MEETINGS, WORK OR FROM GETTING THINGS NEEDED FOR DAILY LIVING?: NO

## 2023-11-14 SDOH — ECONOMIC STABILITY: HOUSING INSECURITY: IN THE LAST 12 MONTHS, HOW MANY PLACES HAVE YOU LIVED?: 1

## 2023-11-14 SDOH — ECONOMIC STABILITY: INCOME INSECURITY: HOW HARD IS IT FOR YOU TO PAY FOR THE VERY BASICS LIKE FOOD, HOUSING, MEDICAL CARE, AND HEATING?: SOMEWHAT HARD

## 2023-11-14 SDOH — ECONOMIC STABILITY: FOOD INSECURITY: WITHIN THE PAST 12 MONTHS, THE FOOD YOU BOUGHT JUST DIDN'T LAST AND YOU DIDN'T HAVE MONEY TO GET MORE.: NEVER TRUE

## 2023-11-14 SDOH — HEALTH STABILITY: PHYSICAL HEALTH: ON AVERAGE, HOW MANY DAYS PER WEEK DO YOU ENGAGE IN MODERATE TO STRENUOUS EXERCISE (LIKE A BRISK WALK)?: 0 DAYS

## 2023-11-14 SDOH — ECONOMIC STABILITY: HOUSING INSECURITY
IN THE LAST 12 MONTHS, WAS THERE A TIME WHEN YOU DID NOT HAVE A STEADY PLACE TO SLEEP OR SLEPT IN A SHELTER (INCLUDING NOW)?: NO

## 2023-11-14 SDOH — ECONOMIC STABILITY: TRANSPORTATION INSECURITY
IN THE PAST 12 MONTHS, HAS THE LACK OF TRANSPORTATION KEPT YOU FROM MEDICAL APPOINTMENTS OR FROM GETTING MEDICATIONS?: NO

## 2023-11-14 SDOH — ECONOMIC STABILITY: TRANSPORTATION INSECURITY
IN THE PAST 12 MONTHS, HAS LACK OF TRANSPORTATION KEPT YOU FROM MEETINGS, WORK, OR FROM GETTING THINGS NEEDED FOR DAILY LIVING?: NO

## 2023-11-14 SDOH — ECONOMIC STABILITY: FOOD INSECURITY: WITHIN THE PAST 12 MONTHS, YOU WORRIED THAT YOUR FOOD WOULD RUN OUT BEFORE YOU GOT MONEY TO BUY MORE.: NEVER TRUE

## 2023-11-14 SDOH — ECONOMIC STABILITY: INCOME INSECURITY: IN THE LAST 12 MONTHS, WAS THERE A TIME WHEN YOU WERE NOT ABLE TO PAY THE MORTGAGE OR RENT ON TIME?: NO

## 2023-11-14 SDOH — HEALTH STABILITY: MENTAL HEALTH
STRESS IS WHEN SOMEONE FEELS TENSE, NERVOUS, ANXIOUS, OR CAN'T SLEEP AT NIGHT BECAUSE THEIR MIND IS TROUBLED. HOW STRESSED ARE YOU?: RATHER MUCH

## 2023-11-14 ASSESSMENT — SOCIAL DETERMINANTS OF HEALTH (SDOH)
HOW OFTEN DO YOU GET TOGETHER WITH FRIENDS OR RELATIVES?: ONCE A WEEK
HOW OFTEN DO YOU ATTEND CHURCH OR RELIGIOUS SERVICES?: PATIENT DECLINED
HOW HARD IS IT FOR YOU TO PAY FOR THE VERY BASICS LIKE FOOD, HOUSING, MEDICAL CARE, AND HEATING?: SOMEWHAT HARD
HOW OFTEN DO YOU HAVE A DRINK CONTAINING ALCOHOL: NEVER
IN A TYPICAL WEEK, HOW MANY TIMES DO YOU TALK ON THE PHONE WITH FAMILY, FRIENDS, OR NEIGHBORS?: MORE THAN THREE TIMES A WEEK
HOW OFTEN DO YOU GET TOGETHER WITH FRIENDS OR RELATIVES?: ONCE A WEEK
HOW OFTEN DO YOU ATTENT MEETINGS OF THE CLUB OR ORGANIZATION YOU BELONG TO?: PATIENT DECLINED
HOW OFTEN DO YOU ATTEND CHURCH OR RELIGIOUS SERVICES?: PATIENT DECLINED
WITHIN THE PAST 12 MONTHS, YOU WORRIED THAT YOUR FOOD WOULD RUN OUT BEFORE YOU GOT THE MONEY TO BUY MORE: NEVER TRUE
HOW OFTEN DO YOU HAVE SIX OR MORE DRINKS ON ONE OCCASION: NEVER
DO YOU BELONG TO ANY CLUBS OR ORGANIZATIONS SUCH AS CHURCH GROUPS UNIONS, FRATERNAL OR ATHLETIC GROUPS, OR SCHOOL GROUPS?: NO
IN A TYPICAL WEEK, HOW MANY TIMES DO YOU TALK ON THE PHONE WITH FAMILY, FRIENDS, OR NEIGHBORS?: MORE THAN THREE TIMES A WEEK
DO YOU BELONG TO ANY CLUBS OR ORGANIZATIONS SUCH AS CHURCH GROUPS UNIONS, FRATERNAL OR ATHLETIC GROUPS, OR SCHOOL GROUPS?: NO
HOW OFTEN DO YOU ATTENT MEETINGS OF THE CLUB OR ORGANIZATION YOU BELONG TO?: PATIENT DECLINED
HOW MANY DRINKS CONTAINING ALCOHOL DO YOU HAVE ON A TYPICAL DAY WHEN YOU ARE DRINKING: PATIENT DOES NOT DRINK

## 2023-11-14 ASSESSMENT — LIFESTYLE VARIABLES
HOW OFTEN DO YOU HAVE SIX OR MORE DRINKS ON ONE OCCASION: NEVER
AUDIT-C TOTAL SCORE: 0
SKIP TO QUESTIONS 9-10: 1
HOW MANY STANDARD DRINKS CONTAINING ALCOHOL DO YOU HAVE ON A TYPICAL DAY: PATIENT DOES NOT DRINK
HOW OFTEN DO YOU HAVE A DRINK CONTAINING ALCOHOL: NEVER

## 2023-11-15 ENCOUNTER — OFFICE VISIT (OUTPATIENT)
Dept: MEDICAL GROUP | Facility: PHYSICIAN GROUP | Age: 68
End: 2023-11-15
Payer: COMMERCIAL

## 2023-11-15 VITALS
BODY MASS INDEX: 34.91 KG/M2 | OXYGEN SATURATION: 95 % | DIASTOLIC BLOOD PRESSURE: 78 MMHG | HEART RATE: 56 BPM | WEIGHT: 197 LBS | HEIGHT: 63 IN | TEMPERATURE: 96.8 F | SYSTOLIC BLOOD PRESSURE: 130 MMHG

## 2023-11-15 DIAGNOSIS — Z23 NEED FOR VACCINATION: ICD-10-CM

## 2023-11-15 DIAGNOSIS — Z00.00 WELLNESS EXAMINATION: ICD-10-CM

## 2023-11-15 DIAGNOSIS — R92.0 MAMMOGRAPHIC MICROCALCIFICATION: ICD-10-CM

## 2023-11-15 DIAGNOSIS — E78.5 DYSLIPIDEMIA: ICD-10-CM

## 2023-11-15 DIAGNOSIS — I10 ESSENTIAL HYPERTENSION: ICD-10-CM

## 2023-11-15 DIAGNOSIS — I10 PRIMARY HYPERTENSION: ICD-10-CM

## 2023-11-15 PROBLEM — M25.561 ACUTE PAIN OF RIGHT KNEE: Status: RESOLVED | Noted: 2020-06-18 | Resolved: 2023-11-15

## 2023-11-15 PROBLEM — K59.1 FUNCTIONAL DIARRHEA: Status: RESOLVED | Noted: 2022-06-28 | Resolved: 2023-11-15
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
## 2023-11-15 PROBLEM — R06.02 SOB (SHORTNESS OF BREATH): Status: RESOLVED | Noted: 2022-05-19 | Resolved: 2023-11-15
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
## 2023-11-15 PROBLEM — G89.29 CHRONIC LEFT-SIDED LOW BACK PAIN WITH SCIATICA: Status: ACTIVE | Noted: 2020-07-23

## 2023-11-15 PROCEDURE — 90471 IMMUNIZATION ADMIN: CPT | Performed by: STUDENT IN AN ORGANIZED HEALTH CARE EDUCATION/TRAINING PROGRAM

## 2023-11-15 PROCEDURE — 99214 OFFICE O/P EST MOD 30 MIN: CPT | Mod: 25 | Performed by: STUDENT IN AN ORGANIZED HEALTH CARE EDUCATION/TRAINING PROGRAM

## 2023-11-15 PROCEDURE — 3078F DIAST BP <80 MM HG: CPT | Performed by: STUDENT IN AN ORGANIZED HEALTH CARE EDUCATION/TRAINING PROGRAM

## 2023-11-15 PROCEDURE — 3075F SYST BP GE 130 - 139MM HG: CPT | Performed by: STUDENT IN AN ORGANIZED HEALTH CARE EDUCATION/TRAINING PROGRAM

## 2023-11-15 PROCEDURE — 90662 IIV NO PRSV INCREASED AG IM: CPT | Performed by: STUDENT IN AN ORGANIZED HEALTH CARE EDUCATION/TRAINING PROGRAM

## 2023-11-15 RX ORDER — DULOXETINE 40 MG/1
40 CAPSULE, DELAYED RELEASE ORAL DAILY
COMMUNITY
Start: 2023-10-04

## 2023-11-15 RX ORDER — METOPROLOL SUCCINATE 25 MG/1
25 TABLET, EXTENDED RELEASE ORAL DAILY
Qty: 90 TABLET | Refills: 3 | Status: SHIPPED | OUTPATIENT
Start: 2023-11-15

## 2023-11-15 RX ORDER — ATORVASTATIN CALCIUM 20 MG/1
20 TABLET, FILM COATED ORAL DAILY
Qty: 90 TABLET | Refills: 3 | Status: SHIPPED | OUTPATIENT
Start: 2023-11-15

## 2023-11-15 RX ORDER — ROPINIROLE 1 MG/1
1 TABLET, FILM COATED ORAL 3 TIMES DAILY
COMMUNITY
Start: 2023-10-04 | End: 2023-11-15

## 2023-11-15 ASSESSMENT — FIBROSIS 4 INDEX: FIB4 SCORE: 1.29

## 2023-11-15 ASSESSMENT — ENCOUNTER SYMPTOMS
PALPITATIONS: 0
CHILLS: 0
SHORTNESS OF BREATH: 0
FEVER: 0

## 2023-11-15 ASSESSMENT — PATIENT HEALTH QUESTIONNAIRE - PHQ9: CLINICAL INTERPRETATION OF PHQ2 SCORE: 0

## 2023-11-15 NOTE — PROGRESS NOTES
"Subjective:     CC: Establish care    HPI:   Nila presents today to establish care.  Patient was accompanied by her daughter who was translating.    Patient reports she follows up with neurology, she was told that she does not have restless leg syndrome and that her lower extremity pain is secondary to her sciatica.    Patient reports that she had a fall at work on 11/14/2023.  She states she will be going to urgent care to start her Workmen's Comp. Case.      Problem   Rls (Restless Legs Syndrome)    Chronic, stable  She has established with neurologist, she was told that she does not have restless leg syndrome and that it may be due sciatica.     Chronic Left-Sided Low Back Pain With Sciatica   Hypertension    Chronic, stable  Medication: Metoprolol 25mg qd, tolerating well     Epigastric Pain    Chronic, intermintent     Pelvic Pain in Female    Chronic, ongoing  Patient is established with Ob/Gyne       Dyslipidemia    Chronic, stable  Medication: Atorvastatin 20mg qd     Fall (Resolved)    Patient reports having a fall at work on 11/14/23, she reports hitting the back of her head. She denies losing consciousness. Prior to the fall patient was having headaches daily. Patients employer was made aware of the fall.     Functional Diarrhea (Resolved)   Sob (Shortness of Breath) (Resolved)   Acute Pain of Right Knee (Resolved)   Venous Insufficiency (Resolved)   Heart palpitations (Resolved)    IMO load March 2020     Gastroesophageal Reflux Disease Without Esophagitis (Resolved)       Health Maintenance: Completed    ROS:  Review of Systems   Constitutional:  Negative for chills and fever.   Respiratory:  Negative for shortness of breath.    Cardiovascular:  Negative for chest pain and palpitations.       Objective:     Exam:  /78 (BP Location: Right arm, Patient Position: Sitting, BP Cuff Size: Adult)   Pulse (!) 56   Temp 36 °C (96.8 °F) (Temporal)   Ht 1.6 m (5' 3\")   Wt 89.4 kg (197 lb)   LMP  (LMP " Unknown)   SpO2 95%   BMI 34.90 kg/m²  Body mass index is 34.9 kg/m².    Physical Exam  Constitutional:       Appearance: Normal appearance.   Cardiovascular:      Rate and Rhythm: Normal rate and regular rhythm.      Heart sounds: Normal heart sounds.   Pulmonary:      Effort: Pulmonary effort is normal. No respiratory distress.      Breath sounds: Normal breath sounds. No stridor. No wheezing, rhonchi or rales.   Chest:      Chest wall: No tenderness.   Neurological:      General: No focal deficit present.      Mental Status: She is alert.               Assessment & Plan:     68 y.o. female with the following -     1. Primary hypertension  2. Essential hypertension  Chronic, controlled.  Blood pressure is at goal under 140/90 in the office today.  We will continue the current regimen.  - metoprolol SR (TOPROL XL) 25 MG TABLET SR 24 HR; Take 1 Tablet by mouth every day.  Dispense: 90 Tablet; Refill: 3    3. Dyslipidemia  Patient with history of high cholesterol.  Will order repeat lipid panel today.  Continue Lipitor 20 mg daily  - atorvastatin (LIPITOR) 20 MG Tab; Take 1 Tablet by mouth every day.  Dispense: 90 Tablet; Refill: 3  - Comp Metabolic Panel; Future  - Lipid Profile; Future    4. Mammographic microcalcification on right breast and biopsy on 4/1/16 ruled out malignancy   Patient with history of abnormal mammogram.  Will order diagnostic mammogram today.  - MA-DIAGNOSTIC MAMMO BILAT W/TOMOSYNTHESIS W/CAD; Future    5. Need for vaccination  - INFLUENZA VACCINE, HIGH DOSE (65+ ONLY)    6. Wellness examination  - CBC WITH DIFFERENTIAL; Future      Following the appointment with me patient to go to urgent care for Workmen's Comp.    Return in about 4 weeks (around 12/13/2023) for Annual.    Please note that this dictation was created using voice recognition software. I have made every reasonable attempt to correct obvious errors, but I expect that there are errors of grammar and possibly content that I did  not discover before finalizing the note.

## 2023-11-15 NOTE — LETTER
[facility]  Teresa Gonzales M.D.  [address]  [place] [address]  Fax: [phone]   Authorization for Release/Disclosure of   Protected Health Information   Name: AMOS MAK : 1955 SSN: [id number]   Address: [address] [place] [id number] Phone:    [phone] (home) [phone] (work)   I authorize the entity listed below to release/disclose the PHI below to:   [facility]/Teresa Gonzales M.D. and Teresa Gonzales M.D.   Provider or Entity Name:  [facility]   Address   [address] [facility],  [address]  [place] [address]   Phone:      Fax:  [phone]   Reason for request: continuity of care   Information to be released:    [  ] LAST COLONOSCOPY,  including any PATH REPORT and follow-up  [  ] LAST FIT/COLOGUARD RESULT [  ] LAST DEXA  [  ] LAST MAMMOGRAM  [  ] LAST PAP  [  ] LAST LABS [  ] RETINA EXAM REPORT  [  ] IMMUNIZATION RECORDS  [ x] Release all info      [  ] Check here and initial the line next to each item to release ALL health information INCLUDING  _____ Care and treatment for drug and / or alcohol abuse  _____ HIV testing, infection status, or AIDS  _____ Genetic Testing    DATES OF SERVICE OR TIME PERIOD TO BE DISCLOSED: _____________  I understand and acknowledge that:  * This Authorization may be revoked at any time by you in writing, except if your health information has already been used or disclosed.  * Your health information that will be used or disclosed as a result of you signing this authorization could be re-disclosed by the recipient. If this occurs, your re-disclosed health information may no longer be protected by State or Federal laws.  * You may refuse to sign this Authorization. Your refusal will not affect your ability to obtain treatment.  * This Authorization becomes effective upon signing and will  on (date) __________.      If no date is indicated, this Authorization will  one (1) year from the signature date.     Name: Nila Villatoro  Signature: Date:   11/15/2023     PLEASE FAX REQUESTED RECORDS BACK TO: [phone]

## 2023-11-29 ENCOUNTER — HOSPITAL ENCOUNTER (OUTPATIENT)
Dept: LAB | Facility: MEDICAL CENTER | Age: 68
End: 2023-11-29
Attending: STUDENT IN AN ORGANIZED HEALTH CARE EDUCATION/TRAINING PROGRAM
Payer: COMMERCIAL

## 2023-11-29 DIAGNOSIS — Z00.00 WELLNESS EXAMINATION: ICD-10-CM

## 2023-11-29 DIAGNOSIS — E78.5 DYSLIPIDEMIA: ICD-10-CM

## 2023-11-29 LAB
BASOPHILS # BLD AUTO: 0.8 % (ref 0–1.8)
BASOPHILS # BLD: 0.06 K/UL (ref 0–0.12)
EOSINOPHIL # BLD AUTO: 0.12 K/UL (ref 0–0.51)
EOSINOPHIL NFR BLD: 1.7 % (ref 0–6.9)
ERYTHROCYTE [DISTWIDTH] IN BLOOD BY AUTOMATED COUNT: 44.3 FL (ref 35.9–50)
HCT VFR BLD AUTO: 40.5 % (ref 37–47)
HGB BLD-MCNC: 13 G/DL (ref 12–16)
IMM GRANULOCYTES # BLD AUTO: 0.02 K/UL (ref 0–0.11)
IMM GRANULOCYTES NFR BLD AUTO: 0.3 % (ref 0–0.9)
LYMPHOCYTES # BLD AUTO: 2.69 K/UL (ref 1–4.8)
LYMPHOCYTES NFR BLD: 37.2 % (ref 22–41)
MCH RBC QN AUTO: 28.6 PG (ref 27–33)
MCHC RBC AUTO-ENTMCNC: 32.1 G/DL (ref 32.2–35.5)
MCV RBC AUTO: 89 FL (ref 81.4–97.8)
MONOCYTES # BLD AUTO: 0.47 K/UL (ref 0–0.85)
MONOCYTES NFR BLD AUTO: 6.5 % (ref 0–13.4)
NEUTROPHILS # BLD AUTO: 3.87 K/UL (ref 1.82–7.42)
NEUTROPHILS NFR BLD: 53.5 % (ref 44–72)
NRBC # BLD AUTO: 0 K/UL
NRBC BLD-RTO: 0 /100 WBC (ref 0–0.2)
PLATELET # BLD AUTO: 256 K/UL (ref 164–446)
PMV BLD AUTO: 10.1 FL (ref 9–12.9)
RBC # BLD AUTO: 4.55 M/UL (ref 4.2–5.4)
WBC # BLD AUTO: 7.2 K/UL (ref 4.8–10.8)

## 2023-11-29 PROCEDURE — 80053 COMPREHEN METABOLIC PANEL: CPT

## 2023-11-29 PROCEDURE — 36415 COLL VENOUS BLD VENIPUNCTURE: CPT

## 2023-11-29 PROCEDURE — 85025 COMPLETE CBC W/AUTO DIFF WBC: CPT

## 2023-11-29 PROCEDURE — 80061 LIPID PANEL: CPT

## 2023-11-30 LAB
ALBUMIN SERPL BCP-MCNC: 3.8 G/DL (ref 3.2–4.9)
ALBUMIN/GLOB SERPL: 1.2 G/DL
ALP SERPL-CCNC: 153 U/L (ref 30–99)
ALT SERPL-CCNC: 17 U/L (ref 2–50)
ANION GAP SERPL CALC-SCNC: 8 MMOL/L (ref 7–16)
AST SERPL-CCNC: 23 U/L (ref 12–45)
BILIRUB SERPL-MCNC: 0.8 MG/DL (ref 0.1–1.5)
BUN SERPL-MCNC: 14 MG/DL (ref 8–22)
CALCIUM ALBUM COR SERPL-MCNC: 8.8 MG/DL (ref 8.5–10.5)
CALCIUM SERPL-MCNC: 8.6 MG/DL (ref 8.5–10.5)
CHLORIDE SERPL-SCNC: 106 MMOL/L (ref 96–112)
CHOLEST SERPL-MCNC: 150 MG/DL (ref 100–199)
CO2 SERPL-SCNC: 27 MMOL/L (ref 20–33)
CREAT SERPL-MCNC: 0.7 MG/DL (ref 0.5–1.4)
FASTING STATUS PATIENT QL REPORTED: NORMAL
GFR SERPLBLD CREATININE-BSD FMLA CKD-EPI: 95 ML/MIN/1.73 M 2
GLOBULIN SER CALC-MCNC: 3.2 G/DL (ref 1.9–3.5)
GLUCOSE SERPL-MCNC: 87 MG/DL (ref 65–99)
HDLC SERPL-MCNC: 41 MG/DL
LDLC SERPL CALC-MCNC: 79 MG/DL
POTASSIUM SERPL-SCNC: 4.3 MMOL/L (ref 3.6–5.5)
PROT SERPL-MCNC: 7 G/DL (ref 6–8.2)
SODIUM SERPL-SCNC: 141 MMOL/L (ref 135–145)
TRIGL SERPL-MCNC: 150 MG/DL (ref 0–149)

## 2023-12-27 ENCOUNTER — OFFICE VISIT (OUTPATIENT)
Dept: MEDICAL GROUP | Facility: PHYSICIAN GROUP | Age: 68
End: 2023-12-27
Payer: COMMERCIAL

## 2023-12-27 VITALS
DIASTOLIC BLOOD PRESSURE: 72 MMHG | OXYGEN SATURATION: 95 % | WEIGHT: 194 LBS | HEART RATE: 61 BPM | TEMPERATURE: 97.7 F | BODY MASS INDEX: 34.37 KG/M2 | SYSTOLIC BLOOD PRESSURE: 118 MMHG

## 2023-12-27 DIAGNOSIS — M54.2 NECK PAIN: ICD-10-CM

## 2023-12-27 DIAGNOSIS — Z23 NEED FOR VACCINATION: ICD-10-CM

## 2023-12-27 DIAGNOSIS — R74.8 ELEVATED ALKALINE PHOSPHATASE LEVEL: ICD-10-CM

## 2023-12-27 DIAGNOSIS — Z12.31 ENCOUNTER FOR SCREENING MAMMOGRAM FOR MALIGNANT NEOPLASM OF BREAST: ICD-10-CM

## 2023-12-27 DIAGNOSIS — Z78.0 POSTMENOPAUSAL: ICD-10-CM

## 2023-12-27 DIAGNOSIS — J31.2 CHRONIC SORE THROAT: ICD-10-CM

## 2023-12-27 PROCEDURE — 3074F SYST BP LT 130 MM HG: CPT | Performed by: STUDENT IN AN ORGANIZED HEALTH CARE EDUCATION/TRAINING PROGRAM

## 2023-12-27 PROCEDURE — 90677 PCV20 VACCINE IM: CPT | Performed by: STUDENT IN AN ORGANIZED HEALTH CARE EDUCATION/TRAINING PROGRAM

## 2023-12-27 PROCEDURE — 3078F DIAST BP <80 MM HG: CPT | Performed by: STUDENT IN AN ORGANIZED HEALTH CARE EDUCATION/TRAINING PROGRAM

## 2023-12-27 PROCEDURE — 90471 IMMUNIZATION ADMIN: CPT | Performed by: STUDENT IN AN ORGANIZED HEALTH CARE EDUCATION/TRAINING PROGRAM

## 2023-12-27 PROCEDURE — 99214 OFFICE O/P EST MOD 30 MIN: CPT | Mod: 25 | Performed by: STUDENT IN AN ORGANIZED HEALTH CARE EDUCATION/TRAINING PROGRAM

## 2023-12-27 RX ORDER — CYCLOBENZAPRINE HCL 10 MG
10 TABLET ORAL NIGHTLY PRN
Qty: 30 TABLET | Refills: 0 | Status: SHIPPED | OUTPATIENT
Start: 2023-12-27 | End: 2024-01-29

## 2023-12-27 ASSESSMENT — ENCOUNTER SYMPTOMS
CHILLS: 0
SHORTNESS OF BREATH: 0
FEVER: 0
PALPITATIONS: 0

## 2023-12-27 ASSESSMENT — FIBROSIS 4 INDEX: FIB4 SCORE: 1.46

## 2023-12-27 NOTE — PROGRESS NOTES
Subjective:     CC: Chronic neck and throat pain    HPI:   Ms. Marcos Villatoro pleasant 68-year-old female who presents today for follow-up. : [place] (ID [id number]).    She reports having neck pain with occasional headaches worsening in the past month. She states that she has been under a lot of stress at work and she has to a lot of physical activity. She has tried taking tylenol which does not help that much, she gets more relief from ibuprofen. She reports changes in her vision especially when trying to read. Denies nausea/vomiting.    Also reports having as sore throat since she had Covid two years ago. She has history of throat issues since she was child, she has hx of tonsillitis. She has been doing warm water rinses with salt and sodium bicarb. She denies having fevers.  She is concerned about having throat cancer due to family history of throat cancer in her father.    She reports that she had back surgery two years ago, she states that she now has occasional pain down the left leg with occasional numbness in the left foot.  Has been taking duloxetine and gabapentin as prescribed.      Patient Active Problem List    Diagnosis Date Noted    JOEY I (cervical intraepithelial neoplasia I) 12/29/2022    RLS (restless legs syndrome) 12/29/2022    Chronic left-sided low back pain with sciatica 07/23/2020    Herniation of left side of L4-L5 intervertebral disc 07/23/2020    Hypertension     Cervical high risk HPV (human papillomavirus) test positive 10/29/2019    Epigastric pain 10/01/2019    Pelvic pain in female 06/21/2018    Mammographic microcalcification on right breast and biopsy on 4/1/16 ruled out malignancy  06/02/2016    Dyslipidemia 02/10/2016     Health Maintenance: Completed    ROS:  Review of Systems   Constitutional:  Negative for chills and fever.   Respiratory:  Negative for shortness of breath.    Cardiovascular:  Negative for chest pain and palpitations.       Objective:     Exam:  BP  118/72 (BP Location: Right arm, Patient Position: Sitting, BP Cuff Size: Large adult)   Pulse 61   Temp 36.5 °C (97.7 °F) (Temporal)   Wt 88 kg (194 lb)   LMP  (LMP Unknown)   SpO2 95%   BMI 34.37 kg/m²  Body mass index is 34.37 kg/m².    Physical Exam  Constitutional:       Appearance: Normal appearance.   HENT:      Mouth/Throat:      Mouth: Mucous membranes are moist.      Pharynx: No pharyngeal swelling, oropharyngeal exudate or posterior oropharyngeal erythema.   Cardiovascular:      Rate and Rhythm: Normal rate and regular rhythm.      Heart sounds: Normal heart sounds.   Pulmonary:      Effort: Pulmonary effort is normal. No respiratory distress.      Breath sounds: Normal breath sounds. No stridor. No wheezing, rhonchi or rales.   Chest:      Chest wall: No tenderness.   Musculoskeletal:      Comments: Tightness of cervical paraspinal musculature   Neurological:      Mental Status: She is alert.           Assessment & Plan:     68 y.o. female with the following -     1. Neck pain  Chronic, ongoing condition.  Patient reports that she has worsening of cervical neck pain for the past month with radiation to the head.  She states she thinks it is secondary to her work requiring her to carry things.  On exam cervical muscular tightness was appreciated.  Will order x-ray imaging and will refer patient to physical therapy.  Will prescribe Flexeril 10 mg to be taken at bedtime.  Side effects of drowsiness were discussed with the patient.  - cyclobenzaprine (FLEXERIL) 10 mg Tab; Take 1 Tablet by mouth at bedtime as needed for Muscle Spasms.  Dispense: 30 Tablet; Refill: 0  - DX-CERVICAL SPINE-2 OR 3 VIEWS; Future  - Referral to Physical Therapy    2. Elevated alkaline phosphatase level  Chronic, stable condition.  CMP from 11/2023 with elevated alkaline phosphatase which has increased from previous labs a year ago.  Will order additional lab work to differentiate between hepatic versus bone origin.  - GAMMA GT  (GGT); Future  - 5' NUCLEOTIDASE; Future    3. Chronic sore throat  Chronic, ongoing condition.  Patient reports having a chronic sore throat since she had COVID 2 years ago.  She reports as a child she had tonsillitis.  She would like to be referred to ENT.  She states there is family history of throat cancer.  - Referral to ENT    4. Need for vaccination  - Pneumococcal Conjugate Vaccine 20-Valent (6 wks+)    5. Postmenopausal  - DS-BONE DENSITY STUDY (DEXA); Future    6. Encounter for screening mammogram for malignant neoplasm of breast  - MA-SCREENING MAMMO BILAT W/TOMOSYNTHESIS W/CAD; Future          Return in about 4 weeks (around 1/24/2024) for Annual Preventative .    Please note that this dictation was created using voice recognition software. I have made every reasonable attempt to correct obvious errors, but I expect that there are errors of grammar and possibly content that I did not discover before finalizing the note.

## 2024-01-26 ENCOUNTER — HOSPITAL ENCOUNTER (OUTPATIENT)
Dept: RADIOLOGY | Facility: MEDICAL CENTER | Age: 69
End: 2024-01-26
Attending: STUDENT IN AN ORGANIZED HEALTH CARE EDUCATION/TRAINING PROGRAM
Payer: COMMERCIAL

## 2024-01-26 DIAGNOSIS — Z78.0 POSTMENOPAUSAL: ICD-10-CM

## 2024-01-26 DIAGNOSIS — Z12.31 ENCOUNTER FOR SCREENING MAMMOGRAM FOR MALIGNANT NEOPLASM OF BREAST: ICD-10-CM

## 2024-01-26 PROCEDURE — 77080 DXA BONE DENSITY AXIAL: CPT

## 2024-01-26 PROCEDURE — 77067 SCR MAMMO BI INCL CAD: CPT

## 2024-01-27 DIAGNOSIS — M54.2 NECK PAIN: ICD-10-CM

## 2024-01-29 RX ORDER — CYCLOBENZAPRINE HCL 10 MG
10 TABLET ORAL NIGHTLY PRN
Qty: 30 TABLET | Refills: 0 | Status: SHIPPED | OUTPATIENT
Start: 2024-01-29 | End: 2024-02-07 | Stop reason: SDUPTHER

## 2024-01-29 NOTE — TELEPHONE ENCOUNTER
Requested Prescriptions     Pending Prescriptions Disp Refills    cyclobenzaprine (FLEXERIL) 10 mg Tab [Pharmacy Med Name: CYCLOBENZAPRINE 10 MG TABLET] 30 Tablet 0     Sig: TAKE 1 TABLET BY MOUTH AT BEDTIME AS NEEDED FOR MUSCLE SPASMS.

## 2024-01-29 NOTE — TELEPHONE ENCOUNTER
Received request via: Pharmacy    Was the patient seen in the last year in this department? Yes    Does the patient have an active prescription (recently filled or refills available) for medication(s) requested? No    Ph[facility] Name[facility]    Does the patient have group home Plus and need 100 day supply (blood pressure, diabetes and cholesterol meds only)? Patient does not have SCP

## 2024-02-07 ENCOUNTER — OFFICE VISIT (OUTPATIENT)
Dept: MEDICAL GROUP | Facility: PHYSICIAN GROUP | Age: 69
End: 2024-02-07
Payer: COMMERCIAL

## 2024-02-07 ENCOUNTER — HOSPITAL ENCOUNTER (OUTPATIENT)
Dept: LAB | Facility: MEDICAL CENTER | Age: 69
End: 2024-02-07
Attending: STUDENT IN AN ORGANIZED HEALTH CARE EDUCATION/TRAINING PROGRAM
Payer: COMMERCIAL

## 2024-02-07 VITALS
DIASTOLIC BLOOD PRESSURE: 76 MMHG | TEMPERATURE: 97.3 F | WEIGHT: 200 LBS | OXYGEN SATURATION: 93 % | SYSTOLIC BLOOD PRESSURE: 138 MMHG | HEART RATE: 63 BPM | BODY MASS INDEX: 35.44 KG/M2 | HEIGHT: 63 IN

## 2024-02-07 DIAGNOSIS — J98.4 RESTRICTIVE LUNG DISEASE: ICD-10-CM

## 2024-02-07 DIAGNOSIS — M54.2 NECK PAIN: ICD-10-CM

## 2024-02-07 DIAGNOSIS — M85.80 OSTEOPENIA, UNSPECIFIED LOCATION: ICD-10-CM

## 2024-02-07 DIAGNOSIS — N87.0 CIN I (CERVICAL INTRAEPITHELIAL NEOPLASIA I): ICD-10-CM

## 2024-02-07 DIAGNOSIS — R74.8 ELEVATED ALKALINE PHOSPHATASE LEVEL: ICD-10-CM

## 2024-02-07 DIAGNOSIS — Z00.00 WELLNESS EXAMINATION: ICD-10-CM

## 2024-02-07 PROCEDURE — 99397 PER PM REEVAL EST PAT 65+ YR: CPT | Performed by: STUDENT IN AN ORGANIZED HEALTH CARE EDUCATION/TRAINING PROGRAM

## 2024-02-07 PROCEDURE — 3078F DIAST BP <80 MM HG: CPT | Performed by: STUDENT IN AN ORGANIZED HEALTH CARE EDUCATION/TRAINING PROGRAM

## 2024-02-07 PROCEDURE — 82977 ASSAY OF GGT: CPT

## 2024-02-07 PROCEDURE — 36415 COLL VENOUS BLD VENIPUNCTURE: CPT

## 2024-02-07 PROCEDURE — 3075F SYST BP GE 130 - 139MM HG: CPT | Performed by: STUDENT IN AN ORGANIZED HEALTH CARE EDUCATION/TRAINING PROGRAM

## 2024-02-07 PROCEDURE — 83915 ASSAY OF NUCLEOTIDASE: CPT

## 2024-02-07 RX ORDER — FLUTICASONE PROPIONATE AND SALMETEROL 100; 50 UG/1; UG/1
1 POWDER RESPIRATORY (INHALATION) EVERY 12 HOURS
Qty: 60 EACH | Refills: 3 | Status: SHIPPED | OUTPATIENT
Start: 2024-02-07 | End: 2024-02-07

## 2024-02-07 RX ORDER — FLUTICASONE PROPIONATE AND SALMETEROL 100; 50 UG/1; UG/1
1 POWDER RESPIRATORY (INHALATION) EVERY 12 HOURS
Qty: 60 EACH | Refills: 0 | Status: SHIPPED | OUTPATIENT
Start: 2024-02-07

## 2024-02-07 RX ORDER — CYCLOBENZAPRINE HCL 10 MG
10 TABLET ORAL NIGHTLY PRN
Qty: 30 TABLET | Refills: 3 | Status: SHIPPED | OUTPATIENT
Start: 2024-02-07

## 2024-02-07 ASSESSMENT — FIBROSIS 4 INDEX: FIB4 SCORE: 1.46

## 2024-02-07 ASSESSMENT — PATIENT HEALTH QUESTIONNAIRE - PHQ9: CLINICAL INTERPRETATION OF PHQ2 SCORE: 0

## 2024-02-07 NOTE — PROGRESS NOTES
Subjective:     CC:   Chief Complaint   Patient presents with    Annual Exam       HPI:   Nila Villatoro is a 68 y.o. female who presents for annual exam. She is feeling well and denies any complaints. : Catherine ([id number]).    Ob-Gyn/ History:    Patient has GYN provider: Yes  /Para:    Last Pap Smear: . She has history of abnormal pap smears.  Gyn Surgery:  LEEP procedure completed in 22  Current Contraceptive Method:  None. Currently not sexually active.  Last menstrual period:  At the age of 43.   No significant bloating/fluid retention, pelvic pain, or dyspareunia. No vaginal discharge. Reports LLQ pain.  Post-menopausal bleeding: None  Urinary incontinence: Yes  Folate intake: N/A    Health Maintenance  Advanced directive: Patient does not have advanced directive  Osteoporosis Screen/ DEXA: DEXA scan completed in 2024 with findings of osteopenia.  PT for falls prevention: N/A  Cholesterol Screening: Lipid panel completed in 2023 with elevated triglyceride levels.  Diabetes Screening: CMP completed in 2023 with normal fasting blood glucose.  Aspirin Use: N/A    Anticipatory Guidance  Diet: States her diet is bad, she eats a little bit in the morning and then at lunch she will have juice, she does not have a proper meal until she gets home in the evening. She eats rice, meat, chicken, and fish.  Exercise: She states has not been able to exercise due to her sciatic, she reports gaining weight after back surgery.  Substance Abuse: N/A  Safe in relationship.   Seat belts, bike helmet, gun safety discussed.  Sun protection used.  Dental Home.    Cancer screening  Colorectal Cancer Screening: Completed, repeat again in 26  Lung Cancer Screening: N/A  Cervical Cancer Screening: Patient had LEEP procedure completed in 2022 and was found to have JOEY-1, referral for gynecology placed for follow-up appointment.  Breast Cancer Screening: Mammogram  completed in 1/2024, no evidence of malignancy.  Repeat again in 1 year    Infectious disease screening/Immunizations  --STI Screening: Declined  --Practices safe sex.  --HIV Screening: Completed, 9/2015 with negative findings  --Hepatitis C Screening: Completed in 10/2019, negative findings.  --Immunizations:    Influenza: Up-to-date   HPV: N/A   Tetanus: Up-to-date, due in 1/26/2030   Shingles: Shingles vaccine recommended at pharmacy   Pneumococcal : Up-to-date   Hepatitis B: N/A  COVID-19: Recommended to have at pharmacy  Other immunizations: N/A    She  has a past medical history of Gastritis, High cholesterol, Hyperlipidemia, Hypertension, Pain, PONV (postoperative nausea and vomiting), and Venous insufficiency (12/09/2016).  She  has a past surgical history that includes breast biopsy; us-needle core bx-breast panel (Left); cholecystectomy (01/01/2009); primary c section; lumbar laminectomy diskectomy (04/26/2022); foraminotomy (04/26/2022); and leep (N/A, 11/2022).    Family History   Problem Relation Age of Onset    Cancer Mother         Cervical Cancer    Cancer Father     Heart Attack Father        Social History     Socioeconomic History    Marital status:      Spouse name: Not on file    Number of children: Not on file    Years of education: Not on file    Highest education level: Associate degree: occupational, technical, or vocational program   Occupational History    Not on file   Tobacco Use    Smoking status: Never    Smokeless tobacco: Never   Vaping Use    Vaping Use: Never used   Substance and Sexual Activity    Alcohol use: Not Currently    Drug use: No    Sexual activity: Not Currently     Partners: Male     Birth control/protection: Post-Menopausal   Other Topics Concern    Not on file   Social History Narrative    Not on file     Social Determinants of Health     Financial Resource Strain: Medium Risk (11/14/2023)    Overall Financial Resource Strain (CARDIA)     Difficulty of Paying  Living Expenses: Somewhat hard   Food Insecurity: No Food Insecurity (11/14/2023)    Hunger Vital Sign     Worried About Running Out of Food in the Last Year: Never true     Ran Out of Food in the Last Year: Never true   Transportation Needs: No Transportation Needs (11/14/2023)    PRAPARE - Transportation     Lack of Transportation (Medical): No     Lack of Transportation (Non-Medical): No   Physical Activity: Inactive (11/14/2023)    Exercise Vital Sign     Days of Exercise per Week: 0 days     Minutes of Exercise per Session: 30 min   Stress: Stress Concern Present (11/14/2023)    [place] [employer] - Occupational Stress Questionnaire     Feeling of Stress : Rather much   Social Connections: Unknown (11/14/2023)    Social Connection and Isolation Panel [NHANES]     Frequency of Communication with Friends and Family: More than three times a week     Frequency of Social Gatherings with Friends and Family: Once a week     Attends Oriental orthodox Services: Patient refused     Active Member of Clubs or Organizations: No     Attends Club or Organization Meetings: Patient refused     Marital Status:    Intimate Partner Violence: Not on file   Housing Stability: Low Risk  (11/14/2023)    Housing Stability Vital Sign     Unable to Pay for Housing in the Last Year: No     Number of Places Lived in the Last Year: 1     Unstable Housing in the Last Year: No       Patient Active Problem List    Diagnosis Date Noted    JOEY I (cervical intraepithelial neoplasia I) 12/29/2022    RLS (restless legs syndrome) 12/29/2022    Chronic left-sided low back pain with sciatica 07/23/2020    Herniation of left side of L4-L5 intervertebral disc 07/23/2020    Hypertension     Cervical high risk HPV (human papillomavirus) test positive 10/29/2019    Epigastric pain 10/01/2019    Pelvic pain in female 06/21/2018    Mammographic microcalcification on right breast and biopsy on 4/1/16 ruled out malignancy  06/02/2016     "Dyslipidemia 02/10/2016         Current Outpatient Medications   Medication Sig Dispense Refill    cyclobenzaprine (FLEXERIL) 10 mg Tab Take 1 Tablet by mouth at bedtime as needed for Muscle Spasms. 30 Tablet 3    fluticasone-salmeterol (ADVAIR DISKUS) 100-50 MCG/ACT AEROSOL POWDER, BREATH ACTIVATED Inhale 1 Puff every 12 hours. 60 Each 3    DULoxetine HCl 40 MG Cap DR Particles Take 40 mg by mouth every day.      metoprolol SR (TOPROL XL) 25 MG TABLET SR 24 HR Take 1 Tablet by mouth every day. 90 Tablet 3    atorvastatin (LIPITOR) 20 MG Tab Take 1 Tablet by mouth every day. 90 Tablet 3    gabapentin (NEURONTIN) 800 MG tablet TOME BRANDY TABLETA JAYJAY VECES AL D A SEG N LO INDICADO FOR 30 DAYS       No current facility-administered medications for this visit.     Allergies   Allergen Reactions    Penicillins Hives     4/2022: tolerated cefazolin       Review of Systems  Constitutional: Negative for fever, chills and malaise/fatigue.   HENT: Negative for congestion.    Eyes: Negative for pain.    Respiratory: Negative for cough and shortness of breath.  Cardiovascular: Negative for leg swelling.   Gastrointestinal: Negative for nausea, vomiting, abdominal pain and diarrhea.   Genitourinary: Negative for dysuria and hematuria.   Skin: Negative for rash.   Neurological: Negative for dizziness, focal weakness and headaches.   Endo/Heme/Allergies: Does not bleed easily.   Psychiatric/Behavioral: Negative for depression.  The patient is not nervous/anxious.      Objective:     /76 (BP Location: Right arm, Patient Position: Sitting, BP Cuff Size: Adult)   Pulse 63   Temp 36.3 °C (97.3 °F) (Temporal)   Ht 1.6 m (5' 3\")   Wt 90.7 kg (200 lb)   LMP  (LMP Unknown)   SpO2 93%   BMI 35.43 kg/m²   Body mass index is 35.43 kg/m².  Wt Readings from Last 4 Encounters:   02/07/24 90.7 kg (200 lb)   12/27/23 88 kg (194 lb)   11/15/23 89.4 kg (197 lb)   04/29/23 87.1 kg (192 lb)       Physical Exam:  Constitutional: " Well-developed and well-nourished. Not diaphoretic. No distress.   Skin: Skin is warm and dry. No rash noted.  Head: Atraumatic without lesions.  Eyes: Conjunctivae and extraocular motions are normal. Pupils are equal, round, and reactive to light. No scleral icterus.   Ears:  External ears unremarkable. Tympanic membranes clear and intact.  Nose: Nares patent. Septum midline. Turbinates without erythema nor edema. No discharge.   Mouth/Throat: Dentition is good. Tongue normal. Oropharynx is clear and moist. Posterior pharynx without erythema or exudates.  Neck: Supple, trachea midline. Normal range of motion. No thyromegaly present. No lymphadenopathy--cervical or supraclavicular.  Cardiovascular: Regular rate and rhythm, S1 and S2 without murmur, rubs, or gallops.  Lungs: Normal inspiratory effort, CTA bilaterally, no wheezes/rhonchi/rales  Breast: Deferred   Abdomen: Soft, non tender, and without distention. Active bowel sounds in all four quadrants. No rebound, guarding, masses or HSM.  : Deferred  Extremities: No cyanosis, clubbing, erythema, nor edema. Distal pulses intact and symmetric.   Musculoskeletal: All major joints AROM full in all directions without pain.  Neurological: Alert and oriented x 3. DTRs 2+/3 and symmetric. No cranial nerve deficit. 5/5 myotomes. Sensation intact.   Psychiatric:  Behavior, mood, and affect are appropriate.        Assessment and Plan:     1. Wellness examination  Presents today for annual preventative wellness visit.  She is up-to-date on colonoscopy, mammogram, DEXA scan.  Labs previously completed and reviewed.      2. Osteopenia, unspecified location  Chronic, stable condition.  DEXA scan from 1/2024 revealed osteopenia.  Patient was advised to take calcium 1200 mg daily and vitamin D 2000 units daily.  Patient was advised to engage in light bearing exercises.    3. JOEY I (cervical intraepithelial neoplasia I)  Per chart review patient had a LEEP procedure in 11/2022  and was advised to follow-up 6 months after the procedure for repeat cotesting and ECC.  However patient never followed up with gynecology.  Per chart review pathology showed JOEY-2 with clear resection margins.  Stat referral for gynecology placed.  Patient to schedule an appointment.  - Referral to Gynecology    4. Neck pain  Chronic, stable condition.  Patient requesting refill for Flexeril  - cyclobenzaprine (FLEXERIL) 10 mg Tab; Take 1 Tablet by mouth at bedtime as needed for Muscle Spasms.  Dispense: 30 Tablet; Refill: 3    5. Restrictive lung disease  Chronic, stable condition.  PFTs completed in 7/2022 with findings of restrictive lung disease.  Patient reports she had relief from Advair inhaler.  Will refill inhaler.  If patient continues to have symptoms we will refer patient to pulmonology.  - fluticasone-salmeterol (ADVAIR DISKUS) 100-50 MCG/ACT AEROSOL POWDER, BREATH ACTIVATED; Inhale 1 Puff every 12 hours.  Dispense: 60 Each; Refill: 3      HCM:  Completed   Labs per orders  Immunizations per orders  Patient counseled about skin care, diet, supplements, prenatal vitamins, safe sex and exercise.      Follow-up: Return in about 3 months (around 5/7/2024) for Chronic Conditions.

## 2024-02-08 LAB — GGT SERPL-CCNC: 14 U/L (ref 7–34)

## 2024-02-09 LAB — 5NT SERPL-CCNC: 5 U/L (ref 0–15)

## 2024-04-24 ENCOUNTER — HOSPITAL ENCOUNTER (OUTPATIENT)
Facility: MEDICAL CENTER | Age: 69
End: 2024-04-24
Attending: OBSTETRICS & GYNECOLOGY
Payer: COMMERCIAL

## 2024-04-24 ENCOUNTER — OFFICE VISIT (OUTPATIENT)
Dept: OBGYN | Facility: CLINIC | Age: 69
End: 2024-04-24
Payer: COMMERCIAL

## 2024-04-24 VITALS — BODY MASS INDEX: 35.07 KG/M2 | SYSTOLIC BLOOD PRESSURE: 139 MMHG | DIASTOLIC BLOOD PRESSURE: 75 MMHG | WEIGHT: 198 LBS

## 2024-04-24 DIAGNOSIS — Z12.39 ENCOUNTER FOR BREAST CANCER SCREENING USING NON-MAMMOGRAM MODALITY: ICD-10-CM

## 2024-04-24 DIAGNOSIS — Z12.4 SCREENING FOR CERVICAL CANCER: ICD-10-CM

## 2024-04-24 DIAGNOSIS — N87.1 DYSPLASIA OF CERVIX, HIGH GRADE CIN 2: ICD-10-CM

## 2024-04-24 DIAGNOSIS — Z01.419 WELL WOMAN EXAM WITH ROUTINE GYNECOLOGICAL EXAM: ICD-10-CM

## 2024-04-24 DIAGNOSIS — R10.2 COMPLAINT OF PELVIC PAIN: ICD-10-CM

## 2024-04-24 LAB — PATHOLOGY CONSULT NOTE: NORMAL

## 2024-04-24 PROCEDURE — 3078F DIAST BP <80 MM HG: CPT | Performed by: OBSTETRICS & GYNECOLOGY

## 2024-04-24 PROCEDURE — 88305 TISSUE EXAM BY PATHOLOGIST: CPT

## 2024-04-24 PROCEDURE — 99397 PER PM REEVAL EST PAT 65+ YR: CPT | Performed by: OBSTETRICS & GYNECOLOGY

## 2024-04-24 PROCEDURE — 87624 HPV HI-RISK TYP POOLED RSLT: CPT

## 2024-04-24 PROCEDURE — 3075F SYST BP GE 130 - 139MM HG: CPT | Performed by: OBSTETRICS & GYNECOLOGY

## 2024-04-24 PROCEDURE — 88175 CYTOPATH C/V AUTO FLUID REDO: CPT

## 2024-04-24 ASSESSMENT — FIBROSIS 4 INDEX: FIB4 SCORE: 1.46

## 2024-04-24 NOTE — PROGRESS NOTES
PT HERE FOR ANNUAL EXAM  CC pt reports some left sided pain  PHONE # VERIFIED  PHARMACY VERIFIED

## 2024-04-24 NOTE — PROGRESS NOTES
Nila Villatoro is a 68 y.o.  female who presents for her Annual Gynecologic Exam      HPI Comments: Pt presents for her annual well woman exam.   Pt complains of left side pelvic pain on and off for about a year. She denies constipation, diarrhea, or change in bowel habits. She denies nausea/ vomiting.   No LMP recorded (lmp unknown). Patient is postmenopausal.  She had a LEEP in 2022 revealing JOEY 2 with clear resection margins and needs repeat cotest today.     Mammogram 2024  Dexa scan 2024, osteopenia, followed by PCP      Review of Systems:   Pertinent positives documented in HPI and all other systems reviewed & are negative    All PMH, PSH, allergies, social history and FH reviewed and updated today:    PGYN Hx:   Pap smear hx:  LEEP in  with JOEY 2 - clear margins    OB History    Para Term  AB Living   5 4 4   1 4   SAB IAB Ectopic Molar Multiple Live Births   1                # Outcome Date GA Lbr Archie/2nd Weight Sex Delivery Anes PTL Lv   5 SAB 1980           4 Term            3 Term            2 Term      CS-Unspec      1 Term      CS-Unspec        Past Medical History:   Diagnosis Date    Gastritis     High cholesterol     Hyperlipidemia     Hypertension     Pain     BACK PAIN    PONV (postoperative nausea and vomiting)     Venous insufficiency 2016     Past Surgical History:   Procedure Laterality Date    LEEP N/A 2022    LUMBAR LAMINECTOMY DISKECTOMY  2022    Procedure: LAMINECTOMY, SPINE, LUMBAR, WITHL DISCECTOMY - L5 WITH L4-S1 MEDIAL FACETECTOMY;  Surgeon: Duane Weiss M.D.;  Location: SURGERY [facility];  Service: Neurosurgery    FORAMINOTOMY  2022    Procedure: FORAMINOTOMY, SPINE;  Surgeon: Duane Weiss M.D.;  [place];  Service: Neurosurgery    CHOLECYSTECTOMY  2009    BREAST BIOPSY      PRIMARY C SECTION      3 times, last one in     US-NEEDLE CORE BX-BREAST PANEL Left      Medications:    Current Outpatient Medications Ordered in Epic   Medication Sig Dispense Refill    cyclobenzaprine (FLEXERIL) 10 mg Tab Take 1 Tablet by mouth at bedtime as needed for Muscle Spasms. 30 Tablet 3    fluticasone-salmeterol (WIXELA INHUB) 100-50 MCG/ACT AEROSOL POWDER, BREATH ACTIVATED Inhale 1 Puff every 12 hours. 60 Each 0    gabapentin (NEURONTIN) 800 MG tablet TOME BRANDY TABLETA JAYJAY VECES AL D A SEG N LO INDICADO FOR 30 DAYS      DULoxetine HCl 40 MG Cap DR Particles Take 40 mg by mouth every day. (Patient not taking: Reported on 4/24/2024)      metoprolol SR (TOPROL XL) 25 MG TABLET SR 24 HR Take 1 Tablet by mouth every day. 90 Tablet 3    atorvastatin (LIPITOR) 20 MG Tab Take 1 Tablet by mouth every day. 90 Tablet 3     No current Epic-ordered facility-administered medications on file.     Penicillins  Social History     Socioeconomic History    Marital status:     Highest education level: Associate degree: occupational, technical, or vocational program   Tobacco Use    Smoking status: Never    Smokeless tobacco: Never   Vaping Use    Vaping Use: Never used   Substance and Sexual Activity    Alcohol use: Not Currently    Drug use: No    Sexual activity: Not Currently     Partners: Male     Birth control/protection: Post-Menopausal     Social Determinants of Health     Financial Resource Strain: Medium Risk (11/14/2023)    Overall Financial Resource Strain (CARDIA)     Difficulty of Paying Living Expenses: Somewhat hard   Food Insecurity: No Food Insecurity (11/14/2023)    Hunger Vital Sign     Worried About Running Out of Food in the Last Year: Never true     Ran Out of Food in the Last Year: Never true   Transportation Needs: No Transportation Needs (11/14/2023)    PRAPARE - Transportation     Lack of Transportation (Medical): No     Lack of Transportation (Non-Medical): No   Physical Activity: Inactive (11/14/2023)    Exercise Vital Sign     Days of Exercise per Week: 0 days     Minutes of Exercise per  Session: 30 min   Stress: Stress Concern Present (11/14/2023)    [place] of [employer]ational Health - Occupational Stress Questionnaire     Feeling of Stress : Rather much   Social Connections: Unknown (11/14/2023)    Social Connection and Isolation Panel [NHANES]     Frequency of Communication with Friends and Family: More than three times a week     Frequency of Social Gatherings with Friends and Family: Once a week     Attends Religion Services: Patient declined     Active Member of Clubs or Organizations: No     Attends Club or Organization Meetings: Patient declined     Marital Status:    Housing Stability: Low Risk  (11/14/2023)    Housing Stability Vital Sign     Unable to Pay for Housing in the Last Year: No     Number of Places Lived in the Last Year: 1     Unstable Housing in the Last Year: No     Family History   Problem Relation Age of Onset    Cancer Mother         Cervical Cancer    Cancer Father     Heart Attack Father           Objective:   Vital measurements:  /75 (BP Location: Right arm, Patient Position: Sitting, BP Cuff Size: Large adult)   Wt 198 lb   LMP  (LMP Unknown)   BMI 35.07 kg/m²   Body mass index is 35.07 kg/m². (Goal BM I>18 <25)    Physical Exam   Nursing note and vitals reviewed.  Constitutional: She is oriented to person, place, and time. She appears well-developed and well-nourished. No distress.     HEENT:   Head: Normocephalic and atraumatic.     Neck: Neck supple. No thyromegaly present. No cervical or supraclavicular lymphadenopathy.    Pulmonary/Chest: Effort normal and breath sounds normal. No respiratory distress.   Cardiovascular: Regular, rate and rhythm. No edema.    Breast: Symmetrical, normal consistency without masses., No dimpling or skin changes, Normal nipples without discharge, no axillary lymphadenopathy    Abdominal: Soft. Bowel sounds are normal. She exhibits no distension and no mass. No tenderness. She has no rebound and no guarding.      Genitourinary:  Pelvic exam was performed with patient supine.  External genitalia with no abnormal pigmentation, labial fusion, rash, tenderness, lesion or injury to the labia bilaterally.  Vagina is pale and atrophic with no lesions, foul discharge, erythema, tenderness or bleeding. No foreign body around the vagina or signs of injury.   Cervix exhibits no motion tenderness but is almost completely flush with vaginal vault and atrophic and stenotic os, no discharge and no friability, no lesions.   Uterus is  not deviated, not enlarged, not fixed and not tender.  Right adnexa displays no mass, no tenderness and no fullness.  Left adnexa displays no palpable mass but is TTP.     Lymphadenopathy: She has no cervical or supraclavicular adenopathy.     Neurological: She is alert and oriented to person, place, and time. She exhibits normal muscle tone.     Skin: Skin is warm and dry. No rash noted. She is not diaphoretic. No erythema. No pallor.     Psychiatric: She has a normal mood and affect. Her behavior is normal. Judgment and thought content normal.        Assessment:     1. Well woman exam with routine gynecological exam  THINPREP PAP WITH HPV      2. Screening for cervical cancer  THINPREP PAP WITH HPV      3. Encounter for breast cancer screening using non-mammogram modality        4. Dysplasia of cervix, high grade JOEY 2  THINPREP PAP WITH HPV      5. Complaint of pelvic pain  US-PELVIC COMPLETE (TRANSABDOMINAL/TRANSVAGINAL) (COMBO)            Plan:     #Well Woman  - Pap and physical exam performed. Discuss pap smear screening guidelines.   - Self breast awareness discussed.  - Counseling: breast self exam, mammography screening, menopause, osteoporosis, and adequate intake of calcium and vitamin D  - Encouraged exercise and proper diet.  - Mammograms annually.   - See medications and orders placed in encounter report.  - Weight bearing exercise daily to strengthen bones  - Calcium 1200mg daily and  Vitamin D 800 IU daily  - Pelvic US ordered for LLQ pain  - continue f/u with PCP and treatment for dyslipidemia, HTN, and chronic pain/sciatica

## 2024-04-28 LAB
CYTOLOGIST CVX/VAG CYTO: NORMAL
CYTOLOGY CVX/VAG DOC CYTO: NORMAL
CYTOLOGY CVX/VAG DOC THIN PREP: NORMAL
HPV I/H RISK 4 DNA CVX QL PROBE+SIG AMP: NEGATIVE
NOTE NL11727A: NORMAL
OTHER STN SPEC: NORMAL
STAT OF ADQ CVX/VAG CYTO-IMP: NORMAL

## 2024-05-14 ENCOUNTER — OFFICE VISIT (OUTPATIENT)
Dept: MEDICAL GROUP | Facility: PHYSICIAN GROUP | Age: 69
End: 2024-05-14
Payer: COMMERCIAL

## 2024-05-14 VITALS
HEART RATE: 71 BPM | WEIGHT: 198 LBS | HEIGHT: 63 IN | TEMPERATURE: 98 F | OXYGEN SATURATION: 94 % | DIASTOLIC BLOOD PRESSURE: 70 MMHG | SYSTOLIC BLOOD PRESSURE: 132 MMHG | BODY MASS INDEX: 35.08 KG/M2

## 2024-05-14 DIAGNOSIS — N87.0 CIN I (CERVICAL INTRAEPITHELIAL NEOPLASIA I): ICD-10-CM

## 2024-05-14 DIAGNOSIS — J02.9 SORE THROAT: ICD-10-CM

## 2024-05-14 DIAGNOSIS — I10 PRIMARY HYPERTENSION: ICD-10-CM

## 2024-05-14 DIAGNOSIS — R10.13 EPIGASTRIC PAIN: ICD-10-CM

## 2024-05-14 DIAGNOSIS — E78.5 DYSLIPIDEMIA: ICD-10-CM

## 2024-05-14 PROBLEM — G25.81 RLS (RESTLESS LEGS SYNDROME): Status: RESOLVED | Noted: 2022-12-29 | Resolved: 2024-05-14

## 2024-05-14 PROCEDURE — 99214 OFFICE O/P EST MOD 30 MIN: CPT | Performed by: STUDENT IN AN ORGANIZED HEALTH CARE EDUCATION/TRAINING PROGRAM

## 2024-05-14 PROCEDURE — 3075F SYST BP GE 130 - 139MM HG: CPT | Performed by: STUDENT IN AN ORGANIZED HEALTH CARE EDUCATION/TRAINING PROGRAM

## 2024-05-14 PROCEDURE — 3078F DIAST BP <80 MM HG: CPT | Performed by: STUDENT IN AN ORGANIZED HEALTH CARE EDUCATION/TRAINING PROGRAM

## 2024-05-14 RX ORDER — ROPINIROLE 1 MG/1
1 TABLET, FILM COATED ORAL 3 TIMES DAILY
COMMUNITY
Start: 2024-03-20 | End: 2024-05-14

## 2024-05-14 ASSESSMENT — ENCOUNTER SYMPTOMS
PALPITATIONS: 0
FEVER: 0
SHORTNESS OF BREATH: 0
CHILLS: 0

## 2024-05-14 ASSESSMENT — FIBROSIS 4 INDEX: FIB4 SCORE: 1.46

## 2024-05-14 NOTE — ASSESSMENT & PLAN NOTE
Patient reports waking up in the morning with a sore throat. She states that this has been ongoing for more than a year now. She describes the pain as burning when she first wakes up. She reports the symptoms began after she had COVID-19. She states gargles with Listerine help improve the pain. She denies having symptoms of heart burn and metallic taste in the mouth. She denies having a cough.

## 2024-05-14 NOTE — PROGRESS NOTES
"Subjective:     CC: Follow-Up    HPI:   Nila is a pleasant 68-year-old who presents today for follow-up visit.    Sore throat  Patient reports waking up in the morning with a sore throat. She states that this has been ongoing for more than a year now. She describes the pain as burning when she first wakes up. She reports the symptoms began after she had COVID-19. She states gargles with Listerine help improve the pain. She denies having symptoms of heart burn and metallic taste in the mouth. She denies having a cough.     Patient Active Problem List    Diagnosis Date Noted    Sore throat 05/14/2024    JOEY I (cervical intraepithelial neoplasia I) 12/29/2022    Chronic left-sided low back pain with sciatica 07/23/2020    Herniation of left side of L4-L5 intervertebral disc 07/23/2020    Hypertension     Cervical high risk HPV (human papillomavirus) test positive 10/29/2019    Epigastric pain 10/01/2019    Pelvic pain in female 06/21/2018    Mammographic microcalcification on right breast and biopsy on 4/1/16 ruled out malignancy  06/02/2016    Dyslipidemia 02/10/2016     Health Maintenance: Completed    ROS:  Review of Systems   Constitutional:  Negative for chills and fever.   Respiratory:  Negative for shortness of breath.    Cardiovascular:  Negative for chest pain and palpitations.       Objective:     Exam:  /70 (BP Location: Right arm, Patient Position: Sitting, BP Cuff Size: Adult)   Pulse 71   Temp 36.7 °C (98 °F) (Temporal)   Ht 1.6 m (5' 3\")   Wt 89.8 kg (198 lb)   LMP  (LMP Unknown)   SpO2 94%   BMI 35.07 kg/m²  Body mass index is 35.07 kg/m².    Physical Exam  Constitutional:       Appearance: Normal appearance.   Cardiovascular:      Rate and Rhythm: Normal rate and regular rhythm.      Heart sounds: Normal heart sounds.   Pulmonary:      Effort: Pulmonary effort is normal. No respiratory distress.      Breath sounds: Normal breath sounds. No stridor. No wheezing, rhonchi or rales. "   Neurological:      Mental Status: She is alert.             Assessment & Plan:     68 y.o. female with the following -     1. Sore throat  Chronic, ongoing.  Etiology unclear, could possibly be secondary to laryngopharyngeal reflux.  Discussed with patient and trialing pantoprazole versus omeprazole.  Patient reports that she previously was on pantoprazole however while on the medication she developed a rash and discontinued the medication.  Will not prescribe omeprazole due to patient having possible allergy to PPIs.  Will refer patient to ENT for further workup.  - Referral to ENT    2. Primary hypertension  Chronic, controlled.  Blood pressure at goal today.  Continue metoprolol 25 mg daily.    3. Dyslipidemia  Chronic, stable.  Continue Lipitor 20 mg daily.    4. Epigastric pain  Chronic, ongoing.  I discussed with the patient in starting a PPI however patient has an allergy to PPIs.  I discussed with patient in cutting down on spicy and acid containing foods.  He was advised to try taking over-the-counter Pepcid to see if this helps with the epigastric pain.    5. JOEY I (cervical intraepithelial neoplasia I)  Resolved.  Patient status post LEEP procedure in 11/2022.  She had a Pap smear completed in 4/2024 with normal cytology and HPV.  Patient is established with gynecology.            Return if symptoms worsen or fail to improve.    Please note that this dictation was created using voice recognition software. I have made every reasonable attempt to correct obvious errors, but I expect that there are errors of grammar and possibly content that I did not discover before finalizing the note.

## 2024-06-06 ENCOUNTER — PHARMACY VISIT (OUTPATIENT)
Dept: PHARMACY | Facility: MEDICAL CENTER | Age: 69
End: 2024-06-06
Payer: MEDICARE

## 2024-06-06 ENCOUNTER — OFFICE VISIT (OUTPATIENT)
Dept: URGENT CARE | Facility: CLINIC | Age: 69
End: 2024-06-06
Payer: COMMERCIAL

## 2024-06-06 VITALS
HEART RATE: 76 BPM | SYSTOLIC BLOOD PRESSURE: 130 MMHG | OXYGEN SATURATION: 97 % | DIASTOLIC BLOOD PRESSURE: 70 MMHG | RESPIRATION RATE: 16 BRPM | WEIGHT: 190.5 LBS | TEMPERATURE: 97.4 F | BODY MASS INDEX: 33.75 KG/M2 | HEIGHT: 63 IN

## 2024-06-06 DIAGNOSIS — L50.9 HIVES: ICD-10-CM

## 2024-06-06 PROCEDURE — 3078F DIAST BP <80 MM HG: CPT | Performed by: PHYSICIAN ASSISTANT

## 2024-06-06 PROCEDURE — 3075F SYST BP GE 130 - 139MM HG: CPT | Performed by: PHYSICIAN ASSISTANT

## 2024-06-06 PROCEDURE — RXMED WILLOW AMBULATORY MEDICATION CHARGE: Performed by: PHYSICIAN ASSISTANT

## 2024-06-06 PROCEDURE — 99214 OFFICE O/P EST MOD 30 MIN: CPT | Performed by: PHYSICIAN ASSISTANT

## 2024-06-06 RX ORDER — METHYLPREDNISOLONE 4 MG/1
TABLET ORAL
Qty: 21 TABLET | Refills: 0 | Status: SHIPPED | OUTPATIENT
Start: 2024-06-06

## 2024-06-06 RX ORDER — TRIAMCINOLONE ACETONIDE 1 MG/G
1 CREAM TOPICAL 2 TIMES DAILY
Qty: 30 G | Refills: 0 | Status: SHIPPED | OUTPATIENT
Start: 2024-06-06

## 2024-06-06 ASSESSMENT — FIBROSIS 4 INDEX: FIB4 SCORE: 1.46

## 2024-06-06 NOTE — LETTER
June 6, 2024         Patient: Nila Villatoro   YOB: 1955   Date of Visit: 6/6/2024           To Whom it May Concern:    Nila Villatoro was seen in my clinic on 6/6/2024. Please excuse any absences from work this week due to acute condition.      If you have any questions or concerns, please don't hesitate to call.        Sincerely,           Joaquim Min P.A.-C.  Electronically Signed

## 2024-06-07 ASSESSMENT — ENCOUNTER SYMPTOMS
CARDIOVASCULAR NEGATIVE: 1
GASTROINTESTINAL NEGATIVE: 1
NEUROLOGICAL NEGATIVE: 1
CONSTITUTIONAL NEGATIVE: 1
RESPIRATORY NEGATIVE: 1
MUSCULOSKELETAL NEGATIVE: 1

## 2024-06-07 NOTE — PROGRESS NOTES
Subjective     Nila Villatoro is a very pleasant 68 y.o. female who presents with Allergic Reaction (Possible allergic reaction to antibiotics (Cephalexin). It's causing a rash and itching since last Thursday. )            HPI  Patient presenting with migratory erythematous, pruritic urticarial rash for the past several days.  Symptoms started shortly after starting cephalexin for a dental infection and procedure.  She stopped the medication 2 days ago but the rash continues.  She has used Benadryl and OTC hydrocortisone.  She denies any throat swelling, shortness of breath, wheezing, stridor, vomiting, diarrhea.      PMH:  has a past medical history of Gastritis, High cholesterol, Hyperlipidemia, Hypertension, Pain, PONV (postoperative nausea and vomiting), RLS (restless legs syndrome) (12/29/2022), and Venous insufficiency (12/09/2016).  MEDS:   Current Outpatient Medications:     triamcinolone acetonide (KENALOG) 0.1 % Cream, Apply 1 Application topically 2 times a day., Disp: 30 g, Rfl: 0    methylPREDNISolone (MEDROL) 4 MG Tab, On day 1 take 6 tablets by mouth, then day 2 take 5 tablets by mouth, then day 3 take 4 tablets by mouth, then day 4 take 3 tablets by mouth, then day 5 take 2 tablets by mouth, then 6 take 1 tablet by mouth, Disp: 21 Tablet, Rfl: 0    metoprolol SR (TOPROL XL) 25 MG TABLET SR 24 HR, Take 1 Tablet by mouth every day., Disp: 90 Tablet, Rfl: 3    atorvastatin (LIPITOR) 20 MG Tab, Take 1 Tablet by mouth every day., Disp: 90 Tablet, Rfl: 3  ALLERGIES:   Allergies   Allergen Reactions    Cephalexin Rash and Itching     Itching and rash on the body.    Penicillins Hives     4/2022: tolerated cefazolin     SURGHX:   Past Surgical History:   Procedure Laterality Date    LEEP N/A 11/2022    LUMBAR LAMINECTOMY DISKECTOMY  04/26/2022    Procedure: LAMINECTOMY, SPINE, LUMBAR, WITHL DISCECTOMY - L5 WITH L4-S1 MEDIAL FACETECTOMY;  Surgeon: Duane Weiss M.D.;  Location: SURGERY [facility]  "[facility];  Service: Neurosurgery    FORAMINOTOMY  04/26/2022    Procedure: FORAMINOTOMY, SPINE;  Surgeon: Duane Weiss M.D.;  Location: SURGERY [facility];  Service: Neurosurgery    CHOLECYSTECTOMY  01/01/2009    BREAST BIOPSY      PRIMARY C SECTION      3 times, last one in 1986    US-NEEDLE CORE BX-BREAST PANEL Left      SOCHX:  reports that she has never smoked. She has never used smokeless tobacco. She reports that she does not currently use alcohol. She reports that she does not use drugs.  FH: family history includes Cancer in her father and mother; Heart Attack in her father.          Review of Systems   Constitutional: Negative.    HENT: Negative.     Respiratory: Negative.     Cardiovascular: Negative.    Gastrointestinal: Negative.    Genitourinary: Negative.    Musculoskeletal: Negative.    Skin:  Positive for itching and rash.   Neurological: Negative.        Medications, Allergies, and current problem list reviewed today in Epic             Objective     /70 (BP Location: Left arm, Patient Position: Sitting, BP Cuff Size: Adult)   Pulse 76   Temp 36.3 °C (97.4 °F) (Temporal)   Resp 16   Ht 1.6 m (5' 3\")   Wt 86.4 kg (190 lb 8 oz)   LMP  (LMP Unknown)   SpO2 97%   BMI 33.75 kg/m²      Physical Exam  Vitals and nursing note reviewed.   Constitutional:       General: She is not in acute distress.     Appearance: Normal appearance. She is well-developed. She is not ill-appearing, toxic-appearing or diaphoretic.   HENT:      Head: Normocephalic and atraumatic.      Right Ear: Hearing and external ear normal.      Left Ear: Hearing and external ear normal.      Nose: Nose normal.      Mouth/Throat:      Comments: Talking in complete sentences and tolerating oral secretions  Eyes:      General:         Right eye: No discharge.         Left eye: No discharge.      Conjunctiva/sclera: Conjunctivae normal.   Cardiovascular:      Rate and Rhythm: Normal rate and regular rhythm.      Heart sounds: " Normal heart sounds.   Pulmonary:      Effort: Pulmonary effort is normal. No respiratory distress.      Breath sounds: Normal breath sounds. No stridor. No wheezing, rhonchi or rales.   Musculoskeletal:      Cervical back: Normal range of motion and neck supple.   Skin:     General: Skin is warm and dry.      Findings: Rash present. Rash is urticarial (Diffuse, scattered, erythematous, migratory urticarial rash.  No open lesions, discharge, blisters or vesicles.).   Neurological:      General: No focal deficit present.      Mental Status: She is alert and oriented to person, place, and time.   Psychiatric:         Mood and Affect: Mood normal.         Behavior: Behavior normal.         Thought Content: Thought content normal.         Judgment: Judgment normal.                             Assessment & Plan     This is a very pleasant 68-year-old female presenting with a erythematous pruritic urticarial rash which is transient and migratory for the last several days.  Symptoms started shortly after initiating Keflex for dental infection.  She stopped Keflex 2 days ago but continues to have the rash.  She has been using OTC meds.  She denies any airway involvement, shortness of breath, wheezing, stridor, vomiting.  Vital signs are normal and pO2 is adequate.  Lungs are clear without wheezing or rhonchi, rales or stridor.  Patient talking in complete sentences and tolerating oral secretions.  No signs of airway involvement.  Urticarial rash noted without open lesions, discharge, blisters or vesicles.  ER and red flag symptoms discussed at length for allergic reaction.      1. Hives  triamcinolone acetonide (KENALOG) 0.1 % Cream    methylPREDNISolone (MEDROL) 4 MG Tab            I personally reviewed prior external notes and test results pertinent to today's visit. Return to clinic or go to ED if symptoms worsen or persist. Red flag symptoms and indications for ED discussed at length. Patient/Parent/Guardian voices  understanding.  AVS with post-visit instructions printed and provided or given verbally.  Follow-up with your primary care provider in 3-5 days. All side effects and potential interactions of prescribed medication discussed including allergic response, GI upset, tendon injury, rash, sedation, OCP effectiveness, etc.    Please note that this dictation was created using voice recognition software. I have made every reasonable attempt to correct obvious errors, but I expect that there are errors of grammar and possibly content that I did not discover before finalizing the note.       1. Hives  triamcinolone acetonide (KENALOG) 0.1 % Cream    methylPREDNISolone (MEDROL) 4 MG Tab

## 2024-06-26 ENCOUNTER — HOSPITAL ENCOUNTER (OUTPATIENT)
Dept: LAB | Facility: MEDICAL CENTER | Age: 69
End: 2024-06-26
Attending: PSYCHIATRY & NEUROLOGY
Payer: COMMERCIAL

## 2024-06-26 LAB
FERRITIN SERPL-MCNC: 45.2 NG/ML (ref 10–291)
RHEUMATOID FACT SER IA-ACNC: <10 IU/ML (ref 0–14)
THYROPEROXIDASE AB SERPL-ACNC: 12 IU/ML (ref 0–9)
TSH SERPL DL<=0.005 MIU/L-ACNC: 1.11 UIU/ML (ref 0.38–5.33)
VIT B12 SERPL-MCNC: 482 PG/ML (ref 211–911)

## 2024-06-26 PROCEDURE — 86235 NUCLEAR ANTIGEN ANTIBODY: CPT | Mod: 91

## 2024-06-26 PROCEDURE — 36415 COLL VENOUS BLD VENIPUNCTURE: CPT

## 2024-06-26 PROCEDURE — 86431 RHEUMATOID FACTOR QUANT: CPT

## 2024-06-26 PROCEDURE — 86376 MICROSOMAL ANTIBODY EACH: CPT

## 2024-06-26 PROCEDURE — 86038 ANTINUCLEAR ANTIBODIES: CPT

## 2024-06-26 PROCEDURE — 82728 ASSAY OF FERRITIN: CPT

## 2024-06-26 PROCEDURE — 82607 VITAMIN B-12: CPT

## 2024-06-26 PROCEDURE — 84443 ASSAY THYROID STIM HORMONE: CPT

## 2024-06-26 PROCEDURE — 82525 ASSAY OF COPPER: CPT

## 2024-06-26 PROCEDURE — 86800 THYROGLOBULIN ANTIBODY: CPT

## 2024-06-27 LAB — COPPER SERPL-MCNC: 160.4 UG/DL (ref 80–155)

## 2024-06-28 LAB
NUCLEAR IGG SER QL IA: NORMAL
THYROGLOB AB SERPL-ACNC: <0.9 IU/ML (ref 0–4)

## 2024-06-29 ENCOUNTER — HOSPITAL ENCOUNTER (EMERGENCY)
Facility: MEDICAL CENTER | Age: 69
End: 2024-06-29
Attending: EMERGENCY MEDICINE
Payer: COMMERCIAL

## 2024-06-29 ENCOUNTER — APPOINTMENT (OUTPATIENT)
Dept: RADIOLOGY | Facility: MEDICAL CENTER | Age: 69
End: 2024-06-29
Attending: EMERGENCY MEDICINE
Payer: COMMERCIAL

## 2024-06-29 ENCOUNTER — PHARMACY VISIT (OUTPATIENT)
Dept: PHARMACY | Facility: MEDICAL CENTER | Age: 69
End: 2024-06-29
Payer: MEDICARE

## 2024-06-29 VITALS
RESPIRATION RATE: 15 BRPM | WEIGHT: 191.58 LBS | HEIGHT: 63 IN | TEMPERATURE: 97.5 F | BODY MASS INDEX: 33.95 KG/M2 | OXYGEN SATURATION: 89 % | DIASTOLIC BLOOD PRESSURE: 59 MMHG | HEART RATE: 65 BPM | SYSTOLIC BLOOD PRESSURE: 127 MMHG

## 2024-06-29 DIAGNOSIS — R10.9 FLANK PAIN: ICD-10-CM

## 2024-06-29 LAB
ALBUMIN SERPL BCP-MCNC: 4 G/DL (ref 3.2–4.9)
ALBUMIN/GLOB SERPL: 1.1 G/DL
ALP SERPL-CCNC: 169 U/L (ref 30–99)
ALT SERPL-CCNC: 16 U/L (ref 2–50)
ANION GAP SERPL CALC-SCNC: 12 MMOL/L (ref 7–16)
APPEARANCE UR: CLEAR
AST SERPL-CCNC: 24 U/L (ref 12–45)
BASOPHILS # BLD AUTO: 0.5 % (ref 0–1.8)
BASOPHILS # BLD: 0.04 K/UL (ref 0–0.12)
BILIRUB SERPL-MCNC: 0.6 MG/DL (ref 0.1–1.5)
BILIRUB UR QL STRIP.AUTO: NEGATIVE
BUN SERPL-MCNC: 13 MG/DL (ref 8–22)
CALCIUM ALBUM COR SERPL-MCNC: 9.3 MG/DL (ref 8.5–10.5)
CALCIUM SERPL-MCNC: 9.3 MG/DL (ref 8.5–10.5)
CHLORIDE SERPL-SCNC: 104 MMOL/L (ref 96–112)
CO2 SERPL-SCNC: 23 MMOL/L (ref 20–33)
COLOR UR: YELLOW
CREAT SERPL-MCNC: 0.68 MG/DL (ref 0.5–1.4)
EKG IMPRESSION: NORMAL
ENA SS-A 60KD AB SER-ACNC: 0 AU/ML (ref 0–40)
ENA SS-A IGG SER QL: 27 AU/ML (ref 0–40)
ENA SS-B IGG SER IA-ACNC: 3 AU/ML (ref 0–40)
EOSINOPHIL # BLD AUTO: 0.19 K/UL (ref 0–0.51)
EOSINOPHIL NFR BLD: 2.3 % (ref 0–6.9)
ERYTHROCYTE [DISTWIDTH] IN BLOOD BY AUTOMATED COUNT: 46.8 FL (ref 35.9–50)
GFR SERPLBLD CREATININE-BSD FMLA CKD-EPI: 95 ML/MIN/1.73 M 2
GLOBULIN SER CALC-MCNC: 3.7 G/DL (ref 1.9–3.5)
GLUCOSE SERPL-MCNC: 103 MG/DL (ref 65–99)
GLUCOSE UR STRIP.AUTO-MCNC: NEGATIVE MG/DL
HCT VFR BLD AUTO: 43.1 % (ref 37–47)
HGB BLD-MCNC: 13.6 G/DL (ref 12–16)
IMM GRANULOCYTES # BLD AUTO: 0.02 K/UL (ref 0–0.11)
IMM GRANULOCYTES NFR BLD AUTO: 0.2 % (ref 0–0.9)
KETONES UR STRIP.AUTO-MCNC: NEGATIVE MG/DL
LEUKOCYTE ESTERASE UR QL STRIP.AUTO: NEGATIVE
LIPASE SERPL-CCNC: 44 U/L (ref 11–82)
LYMPHOCYTES # BLD AUTO: 3.46 K/UL (ref 1–4.8)
LYMPHOCYTES NFR BLD: 42.5 % (ref 22–41)
MCH RBC QN AUTO: 28.9 PG (ref 27–33)
MCHC RBC AUTO-ENTMCNC: 31.6 G/DL (ref 32.2–35.5)
MCV RBC AUTO: 91.7 FL (ref 81.4–97.8)
MICRO URNS: NORMAL
MONOCYTES # BLD AUTO: 0.65 K/UL (ref 0–0.85)
MONOCYTES NFR BLD AUTO: 8 % (ref 0–13.4)
NEUTROPHILS # BLD AUTO: 3.79 K/UL (ref 1.82–7.42)
NEUTROPHILS NFR BLD: 46.5 % (ref 44–72)
NITRITE UR QL STRIP.AUTO: NEGATIVE
NRBC # BLD AUTO: 0 K/UL
NRBC BLD-RTO: 0 /100 WBC (ref 0–0.2)
PH UR STRIP.AUTO: 5.5 [PH] (ref 5–8)
PLATELET # BLD AUTO: 278 K/UL (ref 164–446)
PMV BLD AUTO: 9.8 FL (ref 9–12.9)
POTASSIUM SERPL-SCNC: 3.6 MMOL/L (ref 3.6–5.5)
PROT SERPL-MCNC: 7.7 G/DL (ref 6–8.2)
PROT UR QL STRIP: NEGATIVE MG/DL
RBC # BLD AUTO: 4.7 M/UL (ref 4.2–5.4)
RBC UR QL AUTO: NEGATIVE
SODIUM SERPL-SCNC: 139 MMOL/L (ref 135–145)
SP GR UR STRIP.AUTO: 1
TROPONIN T SERPL-MCNC: 6 NG/L (ref 6–19)
UROBILINOGEN UR STRIP.AUTO-MCNC: 0.2 MG/DL
WBC # BLD AUTO: 8.2 K/UL (ref 4.8–10.8)

## 2024-06-29 PROCEDURE — 81003 URINALYSIS AUTO W/O SCOPE: CPT

## 2024-06-29 PROCEDURE — RXOTC WILLOW AMBULATORY OTC CHARGE

## 2024-06-29 PROCEDURE — 93005 ELECTROCARDIOGRAM TRACING: CPT | Performed by: EMERGENCY MEDICINE

## 2024-06-29 PROCEDURE — 80053 COMPREHEN METABOLIC PANEL: CPT

## 2024-06-29 PROCEDURE — 76705 ECHO EXAM OF ABDOMEN: CPT

## 2024-06-29 PROCEDURE — 36415 COLL VENOUS BLD VENIPUNCTURE: CPT

## 2024-06-29 PROCEDURE — 84484 ASSAY OF TROPONIN QUANT: CPT

## 2024-06-29 PROCEDURE — 74177 CT ABD & PELVIS W/CONTRAST: CPT

## 2024-06-29 PROCEDURE — 700117 HCHG RX CONTRAST REV CODE 255: Performed by: EMERGENCY MEDICINE

## 2024-06-29 PROCEDURE — 99284 EMERGENCY DEPT VISIT MOD MDM: CPT

## 2024-06-29 PROCEDURE — 87086 URINE CULTURE/COLONY COUNT: CPT

## 2024-06-29 PROCEDURE — 85025 COMPLETE CBC W/AUTO DIFF WBC: CPT

## 2024-06-29 PROCEDURE — 83690 ASSAY OF LIPASE: CPT

## 2024-06-29 PROCEDURE — 87186 SC STD MICRODIL/AGAR DIL: CPT

## 2024-06-29 PROCEDURE — 700102 HCHG RX REV CODE 250 W/ 637 OVERRIDE(OP): Performed by: EMERGENCY MEDICINE

## 2024-06-29 PROCEDURE — RXMED WILLOW AMBULATORY MEDICATION CHARGE: Performed by: EMERGENCY MEDICINE

## 2024-06-29 PROCEDURE — 87077 CULTURE AEROBIC IDENTIFY: CPT

## 2024-06-29 PROCEDURE — A9270 NON-COVERED ITEM OR SERVICE: HCPCS | Performed by: EMERGENCY MEDICINE

## 2024-06-29 PROCEDURE — 700111 HCHG RX REV CODE 636 W/ 250 OVERRIDE (IP): Mod: JZ | Performed by: EMERGENCY MEDICINE

## 2024-06-29 RX ORDER — MORPHINE SULFATE 4 MG/ML
4 INJECTION INTRAVENOUS ONCE
Status: COMPLETED | OUTPATIENT
Start: 2024-06-29 | End: 2024-06-29

## 2024-06-29 RX ORDER — ONDANSETRON 2 MG/ML
4 INJECTION INTRAMUSCULAR; INTRAVENOUS ONCE
Status: COMPLETED | OUTPATIENT
Start: 2024-06-29 | End: 2024-06-29

## 2024-06-29 RX ORDER — ONDANSETRON 4 MG/1
4 TABLET, ORALLY DISINTEGRATING ORAL EVERY 6 HOURS PRN
Qty: 10 TABLET | Refills: 0 | Status: SHIPPED | OUTPATIENT
Start: 2024-06-29 | End: 2024-07-08

## 2024-06-29 RX ORDER — OXYCODONE HYDROCHLORIDE AND ACETAMINOPHEN 5; 325 MG/1; MG/1
1 TABLET ORAL ONCE
Status: COMPLETED | OUTPATIENT
Start: 2024-06-29 | End: 2024-06-29

## 2024-06-29 RX ORDER — OXYCODONE HYDROCHLORIDE AND ACETAMINOPHEN 5; 325 MG/1; MG/1
1 TABLET ORAL EVERY 6 HOURS PRN
Qty: 15 TABLET | Refills: 0 | Status: SHIPPED | OUTPATIENT
Start: 2024-06-29 | End: 2024-07-03

## 2024-06-29 RX ADMIN — ONDANSETRON 4 MG: 2 INJECTION INTRAMUSCULAR; INTRAVENOUS at 19:45

## 2024-06-29 RX ADMIN — IOHEXOL 97 ML: 350 INJECTION, SOLUTION INTRAVENOUS at 20:45

## 2024-06-29 RX ADMIN — MORPHINE SULFATE 4 MG: 4 INJECTION, SOLUTION INTRAMUSCULAR; INTRAVENOUS at 19:45

## 2024-06-29 RX ADMIN — OXYCODONE AND ACETAMINOPHEN 1 TABLET: 5; 325 TABLET ORAL at 22:24

## 2024-06-29 ASSESSMENT — FIBROSIS 4 INDEX: FIB4 SCORE: 1.46

## 2024-06-29 ASSESSMENT — LIFESTYLE VARIABLES: DO YOU DRINK ALCOHOL: NO

## 2024-06-30 NOTE — ED PROVIDER NOTES
ED Provider Note    CHIEF COMPLAINT  Chief Complaint   Patient presents with    Flank Pain     On right side 3 days ago that has been getting worse in the past few hours. She reports that she is urinating less than usual. +Nausea and chills       EXTERNAL RECORDS REVIEWED  Outpatient Notes none    HPI/ROS  LIMITATION TO HISTORY   None  OUTSIDE HISTORIAN(S):  Has been.  States patient has abdominal pain for about 3 weeks.    Nila Villatoro is a 68 y.o. female who presents here for evaluation of right side pain with radiation to the abdomen.  Patient states that started about 3 weeks ago, but is increasing in pain over the last 3 days.  Patient has no headache, neck pain, or upper back pain.  She has no fever or chills.  No dysuria, urgency or frequency.  Patient states that she does have some mild nausea and chills.    PAST MEDICAL HISTORY   has a past medical history of Gastritis, High cholesterol, Hyperlipidemia, Hypertension, Pain, PONV (postoperative nausea and vomiting), RLS (restless legs syndrome) (12/29/2022), and Venous insufficiency (12/09/2016).    SURGICAL HISTORY   has a past surgical history that includes breast biopsy; us-needle core bx-breast panel (Left); cholecystectomy (01/01/2009); primary c section; lumbar laminectomy diskectomy (04/26/2022); foraminotomy (04/26/2022); and leep (N/A, 11/2022).    FAMILY HISTORY  Family History   Problem Relation Age of Onset    Cancer Mother         Cervical Cancer    Cancer Father     Heart Attack Father        SOCIAL HISTORY  Social History     Tobacco Use    Smoking status: Never    Smokeless tobacco: Never   Vaping Use    Vaping status: Never Used   Substance and Sexual Activity    Alcohol use: Not Currently    Drug use: No    Sexual activity: Not Currently     Partners: Male     Birth control/protection: Post-Menopausal       CURRENT MEDICATIONS  Home Medications       Reviewed by Jasmin Gómez R.N. (Registered Nurse) on 06/29/24 at 1812  Med  "List Status: Partial     Medication Last Dose Status   atorvastatin (LIPITOR) 20 MG Tab  Active   methylPREDNISolone (MEDROL) 4 MG Tab  Active   metoprolol SR (TOPROL XL) 25 MG TABLET SR 24 HR  Active   triamcinolone acetonide (KENALOG) 0.1 % Cream  Active                  Audit from Redirected Encounters    **Home medications have not yet been reviewed for this encounter**         ALLERGIES  Allergies   Allergen Reactions    Cephalexin Rash and Itching     Itching and rash on the body.    Penicillins Hives     4/2022: tolerated cefazolin       PHYSICAL EXAM  VITAL SIGNS: BP (!) 143/90   Pulse 89   Temp 36.3 °C (97.4 °F) (Temporal)   Resp 14   Ht 1.6 m (5' 3\")   Wt 86.9 kg (191 lb 9.3 oz)   LMP  (LMP Unknown)   SpO2 98%   BMI 33.94 kg/m²    Constitutional: Well developed, well nourished. No acute distress.  HEENT: Normocephalic, atraumatic. Posterior pharynx clear and moist.  Eyes:  EOMI. Normal sclera.  Neck: Supple, Full range of motion, nontender.  Chest/Pulmonary: clear to ausculation. Symmetrical expansion.   Cardio: Regular rate and rhythm with no murmur.   Abdomen: Soft, no tenderness to palpation, no guarding, no peritoneal signs  Back: Right CVA tenderness, nontender midline, no step offs.  Musculoskeletal: No deformity, no edema, neurovascular intact.   Neuro: Clear speech, appropriate, cooperative, cranial nerves II-XII grossly intact.  Psych: Normal mood and affect      EKG/LABS  Results for orders placed or performed during the hospital encounter of 06/29/24   CBC with Differential   Result Value Ref Range    WBC 8.2 4.8 - 10.8 K/uL    RBC 4.70 4.20 - 5.40 M/uL    Hemoglobin 13.6 12.0 - 16.0 g/dL    Hematocrit 43.1 37.0 - 47.0 %    MCV 91.7 81.4 - 97.8 fL    MCH 28.9 27.0 - 33.0 pg    MCHC 31.6 (L) 32.2 - 35.5 g/dL    RDW 46.8 35.9 - 50.0 fL    Platelet Count 278 164 - 446 K/uL    MPV 9.8 9.0 - 12.9 fL    Neutrophils-Polys 46.50 44.00 - 72.00 %    Lymphocytes 42.50 (H) 22.00 - 41.00 %    " Monocytes 8.00 0.00 - 13.40 %    Eosinophils 2.30 0.00 - 6.90 %    Basophils 0.50 0.00 - 1.80 %    Immature Granulocytes 0.20 0.00 - 0.90 %    Nucleated RBC 0.00 0.00 - 0.20 /100 WBC    Neutrophils (Absolute) 3.79 1.82 - 7.42 K/uL    Lymphs (Absolute) 3.46 1.00 - 4.80 K/uL    Monos (Absolute) 0.65 0.00 - 0.85 K/uL    Eos (Absolute) 0.19 0.00 - 0.51 K/uL    Baso (Absolute) 0.04 0.00 - 0.12 K/uL    Immature Granulocytes (abs) 0.02 0.00 - 0.11 K/uL    NRBC (Absolute) 0.00 K/uL   Complete Metabolic Panel   Result Value Ref Range    Sodium 139 135 - 145 mmol/L    Potassium 3.6 3.6 - 5.5 mmol/L    Chloride 104 96 - 112 mmol/L    Co2 23 20 - 33 mmol/L    Anion Gap 12.0 7.0 - 16.0    Glucose 103 (H) 65 - 99 mg/dL    Bun 13 8 - 22 mg/dL    Creatinine 0.68 0.50 - 1.40 mg/dL    Calcium 9.3 8.5 - 10.5 mg/dL    Correct Calcium 9.3 8.5 - 10.5 mg/dL    AST(SGOT) 24 12 - 45 U/L    ALT(SGPT) 16 2 - 50 U/L    Alkaline Phosphatase 169 (H) 30 - 99 U/L    Total Bilirubin 0.6 0.1 - 1.5 mg/dL    Albumin 4.0 3.2 - 4.9 g/dL    Total Protein 7.7 6.0 - 8.2 g/dL    Globulin 3.7 (H) 1.9 - 3.5 g/dL    A-G Ratio 1.1 g/dL   Lipase   Result Value Ref Range    Lipase 44 11 - 82 U/L   Urinalysis    Specimen: Urine, Clean Catch   Result Value Ref Range    Color Yellow     Character Clear     Specific Gravity 1.004 <1.035    Ph 5.5 5.0 - 8.0    Glucose Negative Negative mg/dL    Ketones Negative Negative mg/dL    Protein Negative Negative mg/dL    Bilirubin Negative Negative    Urobilinogen, Urine 0.2 Negative    Nitrite Negative Negative    Leukocyte Esterase Negative Negative    Occult Blood Negative Negative    Micro Urine Req see below    ESTIMATED GFR   Result Value Ref Range    GFR (CKD-EPI) 95 >60 mL/min/1.73 m 2   TROPONIN   Result Value Ref Range    Troponin T 6 6 - 19 ng/L   EKG   Result Value Ref Range    Report       [facility] Emergency Dept.    Test Date:  2024-06-29  Pt Name:    AMOS MAK          Department: ER  MRN:        [id number]                      Room:       [facility]  Gender:     Female                       Technician: [id number]  :        1955                   Requested By:SORIN SOLIZ  Order #:    [id number]                    Reading MD:    Measurements  Intervals                                Axis  Rate:       80                           P:          46  WA:         146                          QRS:        -45  QRSD:       111                          T:          5  QT:         381  QTc:        440    Interpretive Statements  Sinus rhythm  Left anterior fascicular block  Abnormal R-wave progression, late transition  Compared to ECG 2022 15:36:07  Left anterior fascicular block now present  Sinus bradycardia no longer present  Left-axis deviation no longer present  T-wave abnormality no longer present        EKG; normal sinus rhythm rate of 80.  No ST elevation no ST depression.  QTc is 440.  Compared to EKG from 2022.    RADIOLOGY/PROCEDURES   I have independently interpreted the diagnostic imaging associated with this visit and am waiting the final reading from the radiologist.   My preliminary interpretation is as follows: See below     Radiologist interpretation:  US-RUQ   Final Result         1.  Common bile duct dilatation, consider causes of biliary obstruction with additional workup as clinically appropriate.   2.  Atrophic kidneys with cortical thinning, appearance favoring medical renal disease.   3.  Echogenic liver, compatible with fatty change versus fibrosis      CT-ABDOMEN-PELVIS WITH   Final Result         1.  Common bile duct dilatation, commonly associated with postcholecystectomy physiology, consider causes of biliary obstruction with additional workup as clinically appropriate.   2.  No acute intra-abdominal abnormality identified   3.  Diverticulosis   4.  Mild atherosclerosis   5.  Pulmonary nodules, see nodule follow-up recommendations below.      [employer] pulmonary nodule recommendations:   Low Risk: CT at 3-6 months, then consider CT at 18-24 months      High Risk: CT at 3-6 months, then at 18-24 months      Comments: Use most suspicious nodule as guide to management. Follow-up intervals may vary according to size and risk.      Low Risk - Minimal or absent history of smoking and of other known risk factors.      High Risk - History of smoking or of other known risk factors.      Note: These recommendations do not apply to lung cancer screening, patients with immunosuppression, or patients with known primary cancer.      Fleischner Society 2017 Guidelines for Management of Incidentally Detected Pulmonary Nodules in Adults             COURSE & MEDICAL DECISION MAKING    ASSESSMENT, COURSE AND PLAN  Care Narrative: This is a 68-year-old female here for evaluation of right flank pain with intermittent radiation to the right abdomen.  Patient states she has had this pain for about 3 or 4 weeks.  Patient states that she has had other pain over the last few days.  Patient has no chest pain or shortness of breath, no vomiting.  Patient has no acute finding on CT scan, other than some common bile duct dilatation.  I then did ultrasound showing similar, but no other acute finding noted.  She does have pulmonary nodules, and patient will follow-up on these as outpatient.  I have written her pain medication, she would like to go home, and will return if she has any worsening symptoms.  At      DISPOSITION AND DISCUSSIONS  I have discussed management of the patient with the following physicians and IVANIA's:  none    Discussion of management with other QHP or appropriate source(s): None     Escalation of care considered, and ultimately not performed: None    Barriers to care at this time, including but not limited to: Patient does not have established PCP.     Decision tools and prescription drugs considered including, but not limited to: Percocet, Zofran.    FINAL  DIAGNOSIS  Flank pain  Pulmonary nodule.        Electronically signed by: Devendra Gomez D.O., 6/29/2024 7:29 PM

## 2024-06-30 NOTE — ED NOTES
PT ambulated back to room with steady gait. PT placed on monitor and call light within reach. Accompanied by visitor and chart is up for any available ERP.

## 2024-06-30 NOTE — ED TRIAGE NOTES
"Chief Complaint   Patient presents with    Flank Pain     On right side 3 days ago that has been getting worse in the past few hours. She reports that she is urinating less than usual. +Nausea and chills       Patient to triage ambulatory with a steady gait, AAOx4, Appropriate precautions in place.     Explained wait time and triage process. Placed back in lobby. Told to notify ED tech or RN of any changes, verbalized understanding.    BP (!) 143/90   Pulse 89   Temp 36.3 °C (97.4 °F) (Temporal)   Resp 14   Ht 1.6 m (5' 3\")   Wt 86.9 kg (191 lb 9.3 oz)   LMP  (LMP Unknown)   SpO2 98%   BMI 33.94 kg/m²     "

## 2024-07-08 ENCOUNTER — OFFICE VISIT (OUTPATIENT)
Dept: MEDICAL GROUP | Facility: PHYSICIAN GROUP | Age: 69
End: 2024-07-08
Payer: COMMERCIAL

## 2024-07-08 VITALS
TEMPERATURE: 97.4 F | DIASTOLIC BLOOD PRESSURE: 62 MMHG | BODY MASS INDEX: 33.52 KG/M2 | HEIGHT: 63 IN | HEART RATE: 64 BPM | WEIGHT: 189.2 LBS | OXYGEN SATURATION: 98 % | SYSTOLIC BLOOD PRESSURE: 136 MMHG

## 2024-07-08 DIAGNOSIS — K83.8 COMMON BILE DUCT DILATION: ICD-10-CM

## 2024-07-08 DIAGNOSIS — R82.79 URINE CULTURE POSITIVE: ICD-10-CM

## 2024-07-08 DIAGNOSIS — E06.3 HASHIMOTO'S DISEASE: ICD-10-CM

## 2024-07-08 DIAGNOSIS — M54.50 ACUTE RIGHT-SIDED LOW BACK PAIN, UNSPECIFIED WHETHER SCIATICA PRESENT: ICD-10-CM

## 2024-07-08 DIAGNOSIS — R91.1 LUNG NODULE SEEN ON IMAGING STUDY: ICD-10-CM

## 2024-07-08 PROCEDURE — 99214 OFFICE O/P EST MOD 30 MIN: CPT | Performed by: STUDENT IN AN ORGANIZED HEALTH CARE EDUCATION/TRAINING PROGRAM

## 2024-07-08 PROCEDURE — 3075F SYST BP GE 130 - 139MM HG: CPT | Performed by: STUDENT IN AN ORGANIZED HEALTH CARE EDUCATION/TRAINING PROGRAM

## 2024-07-08 PROCEDURE — 3078F DIAST BP <80 MM HG: CPT | Performed by: STUDENT IN AN ORGANIZED HEALTH CARE EDUCATION/TRAINING PROGRAM

## 2024-07-08 RX ORDER — NITROFURANTOIN 25; 75 MG/1; MG/1
100 CAPSULE ORAL 2 TIMES DAILY
Qty: 10 CAPSULE | Refills: 0 | Status: SHIPPED | OUTPATIENT
Start: 2024-07-08 | End: 2024-07-13

## 2024-07-08 RX ORDER — LEVOTHYROXINE SODIUM 0.03 MG/1
25 TABLET ORAL
Qty: 90 TABLET | Refills: 1 | Status: SHIPPED | OUTPATIENT
Start: 2024-07-08

## 2024-07-08 ASSESSMENT — ENCOUNTER SYMPTOMS
PALPITATIONS: 0
FEVER: 0
CHILLS: 0
SHORTNESS OF BREATH: 0

## 2024-07-08 ASSESSMENT — FIBROSIS 4 INDEX: FIB4 SCORE: 1.45

## 2024-07-08 NOTE — ED NOTES
ED Positive Culture Follow-up/Notification Note:    Date: 7/7/24      Patient seen in the ED on 6/29/2024 for right side pain with radiation to the abdomen x 3 weeks. Denies dysuria, urgency or frequency. Rt CVA tenderness on exam. No leukocytosis or fever.   1. Flank pain       Discharge Medication List as of 6/29/2024 10:10 PM        START taking these medications    Details   oxyCODONE-acetaminophen (PERCOCET) 5-325 MG Tab Take 1 Tablet by mouth every 6 hours as needed for Moderate Pain for up to 4 days., Disp-15 Tablet, R-0, Normal      ondansetron (ZOFRAN ODT) 4 MG TABLET DISPERSIBLE Take 1 Tablet by mouth every 6 hours as needed for Nausea/Vomiting., Disp-10 Tablet, R-0, Normal             Allergies: Cephalexin and Penicillins     Vitals:    06/29/24 1930 06/29/24 2000 06/29/24 2100 06/29/24 2200   BP: (!) 176/83 (!) 140/88 137/63 127/59   Pulse: 82 77 69 65   Resp: 14 15 15 15   Temp:    36.4 °C (97.5 °F)   TempSrc:    Temporal   SpO2: 96% 90% 91% 89%   Weight:       Height:          Latest Reference Range & Units 06/29/24 20:05   Color  Yellow   Character  Clear   Specific Gravity <1.035  1.004   Ph 5.0 - 8.0  5.5   Glucose Negative mg/dL Negative   Ketones Negative mg/dL Negative   Bilirubin Negative  Negative   Occult Blood Negative  Negative   Protein Negative mg/dL Negative   Nitrite Negative  Negative   Leukocyte Esterase Negative  Negative   Urobilinogen, Urine Negative  0.2   Micro Urine Req  see below     Final cultures:   Results       Procedure Component Value Units Date/Time    URINE CULTURE(NEW) [[id number]]  (Abnormal)  (Susceptibility) Collected: 06/29/24 2005    Order Status: Completed Specimen: Urine, Clean Catch Updated: 07/01/24 0803     Significant Indicator POS     Source UR     Site URINE, CLEAN CATCH     Culture Result -      Enterococcus faecalis  >100,000 cfu/mL      Narrative:      Indication for culture:->Patient WITHOUT an indwelling Ann    Susceptibility       Enterococcus  faecalis (1)       Antibiotic Interpretation Microscan   Method Status    Ciprofloxacin  [*]  Sensitive <=1 mcg/mL RODRIGO Final    Daptomycin Sensitive 2 mcg/mL RODRIGO Final    Nitrofurantoin Sensitive <=32 mcg/mL RODRIGO Final    Gent Synergy  [*]  Sensitive <=500 mcg/mL RODRIGO Final    Levofloxacin  [*]  Sensitive 2 mcg/mL RODRIGO Final    Ampicillin Sensitive <=2 mcg/mL RODRIGO Final    Linezolid Sensitive 2 mcg/mL RODRIGO Final    Vancomycin Sensitive 2 mcg/mL RODRIGO Final    Penicillin  [*]  Sensitive 2 mcg/mL RODRIGO Final    Rifampin  [*]  Intermediate 2 mcg/mL RODRIGO Final    Strep Synergy  [*]  Sensitive <=1000 mcg/mL RODRIGO Final    Tetracycline Resistant >8 mcg/mL RODRIGO Final    Tigecycline  [*]  Sensitive <=0.25 mcg/mL RODRIGO Final               [*]  Suppressed Antibiotic                           Plan:   UA negative. No urinary symptoms at the time of visit. The presence of enterococci in the urinary tract is often asymptomatic, and in the absence of diagnosis of UTI and urinary symptoms, no antibiotics are required at this time.    Danya Zavala, PharmD

## 2024-08-08 DIAGNOSIS — E78.5 DYSLIPIDEMIA: ICD-10-CM

## 2024-08-08 RX ORDER — ATORVASTATIN CALCIUM 20 MG/1
20 TABLET, FILM COATED ORAL DAILY
Qty: 90 TABLET | Refills: 3 | Status: SHIPPED | OUTPATIENT
Start: 2024-08-08

## 2024-08-08 NOTE — TELEPHONE ENCOUNTER
Requested Prescriptions     Pending Prescriptions Disp Refills    atorvastatin (LIPITOR) 20 MG Tab [Pharmacy Med Name: ATORVASTATIN 20 MG TABLET] 90 Tablet 3     Sig: TOME 1 TABLETA POR VIA ORAL TODOS [place]

## 2024-08-08 NOTE — TELEPHONE ENCOUNTER
Received request via: Pharmacy    Was the patient seen in the last year in this department? Yes    Does the patient have an active prescription (recently filled or refills available) for medication(s) requested? No    Pharmacy Name: [facility]    Does the patient have alf Plus and need 100-day supply? (This applies to ALL medications) Patient does not have SCP

## 2024-08-29 ENCOUNTER — HOSPITAL ENCOUNTER (OUTPATIENT)
Dept: LAB | Facility: MEDICAL CENTER | Age: 69
End: 2024-08-29
Attending: STUDENT IN AN ORGANIZED HEALTH CARE EDUCATION/TRAINING PROGRAM
Payer: COMMERCIAL

## 2024-08-29 DIAGNOSIS — E06.3 HASHIMOTO'S DISEASE: ICD-10-CM

## 2024-08-29 LAB
T4 FREE SERPL-MCNC: 1.09 NG/DL (ref 0.93–1.7)
TSH SERPL-ACNC: 1.5 UIU/ML (ref 0.35–5.5)

## 2024-08-29 PROCEDURE — 36415 COLL VENOUS BLD VENIPUNCTURE: CPT

## 2024-08-29 PROCEDURE — 84443 ASSAY THYROID STIM HORMONE: CPT

## 2024-08-29 PROCEDURE — 84439 ASSAY OF FREE THYROXINE: CPT

## 2024-09-04 ENCOUNTER — HOSPITAL ENCOUNTER (OUTPATIENT)
Dept: RADIOLOGY | Facility: MEDICAL CENTER | Age: 69
End: 2024-09-04
Attending: STUDENT IN AN ORGANIZED HEALTH CARE EDUCATION/TRAINING PROGRAM
Payer: COMMERCIAL

## 2024-09-04 ENCOUNTER — OFFICE VISIT (OUTPATIENT)
Dept: MEDICAL GROUP | Facility: PHYSICIAN GROUP | Age: 69
End: 2024-09-04
Payer: COMMERCIAL

## 2024-09-04 VITALS
WEIGHT: 190 LBS | TEMPERATURE: 97.6 F | HEART RATE: 85 BPM | DIASTOLIC BLOOD PRESSURE: 68 MMHG | HEIGHT: 63 IN | BODY MASS INDEX: 33.66 KG/M2 | SYSTOLIC BLOOD PRESSURE: 128 MMHG | OXYGEN SATURATION: 97 %

## 2024-09-04 DIAGNOSIS — M79.601 RIGHT ARM PAIN: ICD-10-CM

## 2024-09-04 DIAGNOSIS — R25.1 TREMOR: ICD-10-CM

## 2024-09-04 DIAGNOSIS — G25.81 RESTLESS LEG SYNDROME: ICD-10-CM

## 2024-09-04 DIAGNOSIS — R91.1 LUNG NODULE SEEN ON IMAGING STUDY: ICD-10-CM

## 2024-09-04 DIAGNOSIS — E06.3 HASHIMOTO'S DISEASE: ICD-10-CM

## 2024-09-04 PROCEDURE — 99214 OFFICE O/P EST MOD 30 MIN: CPT | Performed by: STUDENT IN AN ORGANIZED HEALTH CARE EDUCATION/TRAINING PROGRAM

## 2024-09-04 PROCEDURE — 71250 CT THORAX DX C-: CPT

## 2024-09-04 PROCEDURE — 3078F DIAST BP <80 MM HG: CPT | Performed by: STUDENT IN AN ORGANIZED HEALTH CARE EDUCATION/TRAINING PROGRAM

## 2024-09-04 PROCEDURE — 3074F SYST BP LT 130 MM HG: CPT | Performed by: STUDENT IN AN ORGANIZED HEALTH CARE EDUCATION/TRAINING PROGRAM

## 2024-09-04 RX ORDER — CARBIDOPA AND LEVODOPA 25; 100 MG/1; MG/1
1 TABLET ORAL 3 TIMES DAILY
COMMUNITY

## 2024-09-04 RX ORDER — GABAPENTIN 600 MG/1
600 TABLET ORAL 3 TIMES DAILY
COMMUNITY

## 2024-09-04 ASSESSMENT — FIBROSIS 4 INDEX: FIB4 SCORE: 1.45

## 2024-09-04 ASSESSMENT — ENCOUNTER SYMPTOMS
CHILLS: 0
SHORTNESS OF BREATH: 0
FEVER: 0
PALPITATIONS: 0

## 2024-09-04 NOTE — LETTER
Critical access hospital  Teresa Gonzales M.D.  [address]  [place] [address]  Fax: [phone]   Authorization for Release/Disclosure of   Protected Health Information   Name: AMOS MAK : 1955 SSN: [id number]   Address: [address] [place] [id number] Phone:    [phone] (home)    I authorize the entity listed below to release/disclose the PHI below to:   Critical access hospital/Teresa Gonzales M.D. and Teresa Gonzales M.D.   Provider or Entity Name:  [facility]   Address   City, [facility], Zip  [address] [place] [address] Phone:      Fax:   [phone]   Reason for request: continuity of care   Information to be released:    [  ] LAST COLONOSCOPY,  including any PATH REPORT and follow-up  [  ] LAST FIT/COLOGUARD RESULT [  ] LAST DEXA  [  ] LAST MAMMOGRAM  [  ] LAST PAP  [  ] LAST LABS [  ] RETINA EXAM REPORT  [  ] IMMUNIZATION RECORDS  [xxxxxx] Release all info      [  ] Check here and initial the line next to each item to release ALL health information INCLUDING  _____ Care and treatment for drug and / or alcohol abuse  _____ HIV testing, infection status, or AIDS  _____ Genetic Testing    DATES OF SERVICE OR TIME PERIOD TO BE DISCLOSED: _____________  I understand and acknowledge that:  * This Authorization may be revoked at any time by you in writing, except if your health information has already been used or disclosed.  * Your health information that will be used or disclosed as a result of you signing this authorization could be re-disclosed by the recipient. If this occurs, your re-disclosed health information may no longer be protected by State or Federal laws.  * You may refuse to sign this Authorization. Your refusal will not affect your ability to obtain treatment.  * This Authorization becomes effective upon signing and will  on (date) __________.      If no date is indicated, this Authorization will  one (1) year from the signature date.    Name: Amos  Geeta Villatoro  Signature: Date:   9/4/2024     PLEASE FAX REQUESTED RECORDS BACK TO: [phone]

## 2024-09-04 NOTE — PROGRESS NOTES
"Subjective:   Verbal consent was acquired by the patient to use [employer] ambient listening note generation during this visit Yes     CC: Follow-up    Patient reports that she was seen in the emergency department in 6/2024.  She reports while getting her blood pressure taken she began having a tremor of her right thumb.  She reports that she believes it is due to the blood pressure cuff being too tight.  Patient states that she was evaluated by her neurologist and was prescribed medication.  She reports within taking the medication the right thumb tremor is slightly improved.  Patient would like to know if the tremor will completely resolve.      Patient Active Problem List    Diagnosis Date Noted    Common bile duct dilation 07/08/2024    Lung nodule seen on imaging study 07/08/2024    Hashimoto's disease 07/08/2024    Sore throat 05/14/2024    JOEY I (cervical intraepithelial neoplasia I) 12/29/2022    Chronic left-sided low back pain with sciatica 07/23/2020    Herniation of left side of L4-L5 intervertebral disc 07/23/2020    Hypertension     Cervical high risk HPV (human papillomavirus) test positive 10/29/2019    Epigastric pain 10/01/2019    Pelvic pain in female 06/21/2018    Mammographic microcalcification on right breast and biopsy on 4/1/16 ruled out malignancy  06/02/2016    Dyslipidemia 02/10/2016     Health Maintenance: Completed    ROS:  Review of Systems   Constitutional:  Negative for chills and fever.   Respiratory:  Negative for shortness of breath.    Cardiovascular:  Negative for chest pain and palpitations.       Objective:     Exam:  /68 (BP Location: Right arm, Patient Position: Sitting, BP Cuff Size: Adult)   Pulse 85   Temp 36.4 °C (97.6 °F) (Temporal)   Ht 1.6 m (5' 3\")   Wt 86.2 kg (190 lb)   LMP  (LMP Unknown)   SpO2 97%   BMI 33.66 kg/m²  Body mass index is 33.66 kg/m².    Physical Exam  Constitutional:       Appearance: Normal appearance.   Cardiovascular:      Rate and " Rhythm: Normal rate and regular rhythm.      Heart sounds: Normal heart sounds.   Pulmonary:      Effort: Pulmonary effort is normal. No respiratory distress.      Breath sounds: Normal breath sounds. No stridor. No wheezing, rhonchi or rales.   Neurological:      Mental Status: She is alert.      Comments: Right thumb tremor             Labs:    Latest Reference Range & Units 08/29/24 08:23   TSH 0.350 - 5.500 uIU/mL 1.500   Free T-4 0.93 - 1.70 ng/dL 1.09       Assessment & Plan:     68 y.o. female with the following -       1. Tremor  2. Right arm pain  Chronic, ongoing.  Patient reports after an ED visit in 6/2024 she began having tremor of her right thumb.  She was evaluated by her neurologist who prescribed her carbidopa-levodopa  mg daily.  Patient reports noting slight improvement with medication.  I am unsure if her neurologist believes that the patient has Parkinson's and that is why he prescribed the carbidopa levodopa.  I have requested records from her neurologist.  Patient reports she also began having right arm pain when the tremor started.  Patient was advised to apply Voltaren gel to the area.  - diclofenac sodium (VOLTAREN) 1 % Gel; Apply 2 g topically 4 times a day as needed (for right arm pain).  Dispense: 350 g; Refill: 2    3. Restless leg syndrome  Patient presenting with symptoms of restless leg syndrome.  Patient takes gabapentin 600 mg 3 times a day.  Per chart review her neurologist had ordered a ferritin level which returned back less than 75.  Patient was advised to start taking iron supplementation with orange juice.    4. Hashimoto's disease  Chronic, stable.  Patient's thyroid peroxidase antibody from 6/2024 was elevated.  Patient was started on levothyroxine 25 mcg.  Her TSH and free T4 remain normal.          Return if symptoms worsen or fail to improve.    Please note that this dictation was created using voice recognition software. I have made every reasonable attempt to  correct obvious errors, but I expect that there are errors of grammar and possibly content that I did not discover before finalizing the note.

## 2024-10-09 ENCOUNTER — HOSPITAL ENCOUNTER (OUTPATIENT)
Dept: LAB | Facility: MEDICAL CENTER | Age: 69
End: 2024-10-09
Payer: COMMERCIAL

## 2024-10-09 LAB
ALBUMIN SERPL BCP-MCNC: 3.8 G/DL (ref 3.2–4.9)
ALBUMIN/GLOB SERPL: 1.1 G/DL
ALP SERPL-CCNC: 143 U/L (ref 30–99)
ALT SERPL-CCNC: 11 U/L (ref 2–50)
ANION GAP SERPL CALC-SCNC: 11 MMOL/L (ref 7–16)
AST SERPL-CCNC: 22 U/L (ref 12–45)
BILIRUB SERPL-MCNC: 0.8 MG/DL (ref 0.1–1.5)
BUN SERPL-MCNC: 12 MG/DL (ref 8–22)
CALCIUM ALBUM COR SERPL-MCNC: 9.2 MG/DL (ref 8.5–10.5)
CALCIUM SERPL-MCNC: 9 MG/DL (ref 8.5–10.5)
CHLORIDE SERPL-SCNC: 103 MMOL/L (ref 96–112)
CO2 SERPL-SCNC: 24 MMOL/L (ref 20–33)
CREAT SERPL-MCNC: 0.67 MG/DL (ref 0.5–1.4)
GFR SERPLBLD CREATININE-BSD FMLA CKD-EPI: 95 ML/MIN/1.73 M 2
GGT SERPL-CCNC: 14 U/L (ref 7–34)
GLOBULIN SER CALC-MCNC: 3.4 G/DL (ref 1.9–3.5)
GLUCOSE SERPL-MCNC: 91 MG/DL (ref 65–99)
POTASSIUM SERPL-SCNC: 4.2 MMOL/L (ref 3.6–5.5)
PROT SERPL-MCNC: 7.2 G/DL (ref 6–8.2)
SODIUM SERPL-SCNC: 138 MMOL/L (ref 135–145)

## 2024-10-09 PROCEDURE — 86381 MITOCHONDRIAL ANTIBODY EACH: CPT

## 2024-10-09 PROCEDURE — 84075 ASSAY ALKALINE PHOSPHATASE: CPT

## 2024-10-09 PROCEDURE — 86015 ACTIN ANTIBODY EACH: CPT

## 2024-10-09 PROCEDURE — 84080 ASSAY ALKALINE PHOSPHATASES: CPT

## 2024-10-09 PROCEDURE — 36415 COLL VENOUS BLD VENIPUNCTURE: CPT

## 2024-10-09 PROCEDURE — 80053 COMPREHEN METABOLIC PANEL: CPT

## 2024-10-09 PROCEDURE — 82977 ASSAY OF GGT: CPT

## 2024-10-11 LAB
MITOCHONDRIA M2 IGG SER-ACNC: 27.1 UNITS (ref 0–24.9)
SMA IGG SER-ACNC: 11 UNITS (ref 0–19)

## 2024-10-13 LAB
ALP BONE SERPL-CCNC: 55 U/L (ref 0–55)
ALP ISOS SERPL HS-CCNC: 0 U/L
ALP LIVER SERPL-CCNC: 93 U/L (ref 0–94)
ALP SERPL-CCNC: 148 U/L (ref 40–120)

## 2024-10-18 DIAGNOSIS — M54.50 ACUTE RIGHT-SIDED LOW BACK PAIN, UNSPECIFIED WHETHER SCIATICA PRESENT: ICD-10-CM

## 2024-10-21 ENCOUNTER — PHYSICAL THERAPY (OUTPATIENT)
Dept: PHYSICAL THERAPY | Facility: REHABILITATION | Age: 69
End: 2024-10-21
Attending: STUDENT IN AN ORGANIZED HEALTH CARE EDUCATION/TRAINING PROGRAM
Payer: COMMERCIAL

## 2024-10-21 DIAGNOSIS — M54.50 ACUTE RIGHT-SIDED LOW BACK PAIN, UNSPECIFIED WHETHER SCIATICA PRESENT: ICD-10-CM

## 2024-10-21 PROCEDURE — 97162 PT EVAL MOD COMPLEX 30 MIN: CPT

## 2024-10-21 PROCEDURE — 97110 THERAPEUTIC EXERCISES: CPT

## 2024-10-21 ASSESSMENT — ENCOUNTER SYMPTOMS
PAIN SCALE: 4
PAIN SCALE AT LOWEST: 2
PAIN SCALE AT HIGHEST: 10

## 2024-10-25 ENCOUNTER — PHYSICAL THERAPY (OUTPATIENT)
Dept: PHYSICAL THERAPY | Facility: REHABILITATION | Age: 69
End: 2024-10-25
Attending: STUDENT IN AN ORGANIZED HEALTH CARE EDUCATION/TRAINING PROGRAM
Payer: COMMERCIAL

## 2024-10-25 DIAGNOSIS — M54.50 ACUTE RIGHT-SIDED LOW BACK PAIN, UNSPECIFIED WHETHER SCIATICA PRESENT: ICD-10-CM

## 2024-10-25 PROCEDURE — 97014 ELECTRIC STIMULATION THERAPY: CPT

## 2024-10-25 PROCEDURE — 97140 MANUAL THERAPY 1/> REGIONS: CPT

## 2024-10-25 PROCEDURE — 97110 THERAPEUTIC EXERCISES: CPT

## 2024-10-28 ENCOUNTER — PHYSICAL THERAPY (OUTPATIENT)
Dept: PHYSICAL THERAPY | Facility: REHABILITATION | Age: 69
End: 2024-10-28
Attending: STUDENT IN AN ORGANIZED HEALTH CARE EDUCATION/TRAINING PROGRAM
Payer: COMMERCIAL

## 2024-10-28 DIAGNOSIS — M54.50 ACUTE RIGHT-SIDED LOW BACK PAIN, UNSPECIFIED WHETHER SCIATICA PRESENT: ICD-10-CM

## 2024-10-28 PROCEDURE — 97014 ELECTRIC STIMULATION THERAPY: CPT

## 2024-10-28 PROCEDURE — 97110 THERAPEUTIC EXERCISES: CPT

## 2024-10-28 PROCEDURE — 97140 MANUAL THERAPY 1/> REGIONS: CPT

## 2024-11-01 ENCOUNTER — APPOINTMENT (OUTPATIENT)
Dept: PHYSICAL THERAPY | Facility: REHABILITATION | Age: 69
End: 2024-11-01
Attending: STUDENT IN AN ORGANIZED HEALTH CARE EDUCATION/TRAINING PROGRAM
Payer: COMMERCIAL

## 2024-11-04 ENCOUNTER — PHYSICAL THERAPY (OUTPATIENT)
Dept: PHYSICAL THERAPY | Facility: REHABILITATION | Age: 69
End: 2024-11-04
Attending: STUDENT IN AN ORGANIZED HEALTH CARE EDUCATION/TRAINING PROGRAM
Payer: COMMERCIAL

## 2024-11-04 DIAGNOSIS — M54.50 ACUTE RIGHT-SIDED LOW BACK PAIN, UNSPECIFIED WHETHER SCIATICA PRESENT: ICD-10-CM

## 2024-11-04 PROCEDURE — 97110 THERAPEUTIC EXERCISES: CPT

## 2024-11-05 NOTE — OP THERAPY DAILY TREATMENT
"  Outpatient Physical Therapy  DAILY TREATMENT     [facility] Physical Therapy [place] [address]  [address]  [place] [address]  Phone:  [phone]  Fax:  [phone]    Date: 11/04/2024    Patient: Nila Villatoro  YOB: 1955  MRN: [id number]     Time Calculation    Start time: 1633  Stop time: 1715 Time Calculation (min): 42 minutes         Chief Complaint: Back Problem    Visit #: 4    SUBJECTIVE:  Pt reports LBP is improving, but L calf pain is still painful. Describes symptoms in the side of the calf, radiating behind the ankle.    OBJECTIVE:  Current objective measures: TTP  L fibularis longus at muscle belly and distal tendon          Therapeutic Exercises (CPT 20841):     1. Standing lumbar extension, reviewed    2. RONNIE w/ deep breathing, not today, Reports centralization    3. Bridge w/ ball squeeze, 10x10\" hold, to fatigue    4. Seated sciatic nerve glide, not today    5. Sit<>stands, 2x10, 18\" seat height    6. Single arm farmer carry 10#, x240 ft    7. Standing hip abd/ext w/ OTB, x10 ea    8. LTR, x1'    9. Seated eversion w/ OTB, 2x fatigue    10. Standing heel raise, x10    11. T/s ext over foam roller, 10x5\" hold      Therapeutic Exercise Summary: Access Code: [email or web address]  URL: [email or web address]  Date: 10/21/2024  Prepared by: Madai Delaney    Exercises  - Standing Lumbar Extension  - 5 x daily - 7 x weekly - 10 reps  - Seated Slump Nerve Glide  - 5 x daily - 7 x weekly - 10 reps  - Supine Sciatic Nerve Glide  - 7 x weekly - 10 reps    Therapeutic Treatments and Modalities:     1. Manual Therapy (CPT 03609), L2-3, L3-4 CPA/LUPA gd 1-2; STM B paraspinals    2. E Stim Unattended (CPT 27295), IFC w/ MHP to lumbar in prone extension, x15'    Time-based treatments/modalities:    Physical Therapy Timed Treatment Charges  Therapeutic exercise minutes (CPT 61959): 42 minutes      ASSESSMENT:   Response to treatment: Progressed functional strengthening " exercises for carryover to walking tolerance. Pt tolerated well, but demo quicker fatigue. Demo shuffling gait pattern during farmer carry. Lower calf pain s/s consistent with fibular longus referral pattern. Pt will continue to benefit from strength and mobility exercises for carryover to functional tasks.       PLAN/RECOMMENDATIONS:   Plan for treatment: therapy treatment to continue next visit.  Planned interventions for next visit: continue with current treatment.

## 2024-11-08 ENCOUNTER — APPOINTMENT (OUTPATIENT)
Dept: PHYSICAL THERAPY | Facility: REHABILITATION | Age: 69
End: 2024-11-08
Attending: STUDENT IN AN ORGANIZED HEALTH CARE EDUCATION/TRAINING PROGRAM
Payer: COMMERCIAL

## 2024-11-11 ENCOUNTER — APPOINTMENT (OUTPATIENT)
Dept: PHYSICAL THERAPY | Facility: REHABILITATION | Age: 69
End: 2024-11-11
Attending: STUDENT IN AN ORGANIZED HEALTH CARE EDUCATION/TRAINING PROGRAM
Payer: COMMERCIAL

## 2024-11-15 ENCOUNTER — PHYSICAL THERAPY (OUTPATIENT)
Dept: PHYSICAL THERAPY | Facility: REHABILITATION | Age: 69
End: 2024-11-15
Attending: STUDENT IN AN ORGANIZED HEALTH CARE EDUCATION/TRAINING PROGRAM
Payer: COMMERCIAL

## 2024-11-15 DIAGNOSIS — M54.50 ACUTE RIGHT-SIDED LOW BACK PAIN, UNSPECIFIED WHETHER SCIATICA PRESENT: ICD-10-CM

## 2024-11-15 PROCEDURE — 97110 THERAPEUTIC EXERCISES: CPT

## 2024-11-15 PROCEDURE — 97140 MANUAL THERAPY 1/> REGIONS: CPT

## 2024-11-15 PROCEDURE — 97012 MECHANICAL TRACTION THERAPY: CPT | Mod: 59

## 2024-11-15 NOTE — OP THERAPY DAILY TREATMENT
"  Outpatient Physical Therapy  DAILY TREATMENT     [facility] Physical Therapy [place] [address] [address]  [address]  [place] [address]  Phone:  [phone]  Fax:  [phone]    Date: 11/15/2024    Patient: Nila Villatoro  YOB: 1955  MRN: [id number]     Time Calculation    Start time: 1631  Stop time: 1735 Time Calculation (min): 64 minutes         Chief Complaint: Back Problem    Visit #: 5    SUBJECTIVE:  Pt reports no improvements in LBP this weak. Continues to have burning pain in L lower leg in the calf. Pt reports symptoms are always worst when trying to sleep.     OBJECTIVE:  Current objective measures:           Therapeutic Exercises (CPT 61553):     1. RONNIE w/ deep breathing, x3', reports decreased calf pain    2. SB bridge, 10x5\" hold    3. Rows w/ OTB, x15, cueing for core engagement    4. Seated sciatic nerve glide, not today    5. Sit<>stands, 2x10, 18\" seat height    6. Pallof press w/ purple, x3' ea    7. Standing hip abd/ext w/ OTB, x10 ea      Therapeutic Exercise Summary: Access Code: [email or web address]  URL: [email or web address]  Date: 10/21/2024  Prepared by: Madai Delaney    Exercises  - Standing Lumbar Extension  - 5 x daily - 7 x weekly - 10 reps  - Seated Slump Nerve Glide  - 5 x daily - 7 x weekly - 10 reps  - Supine Sciatic Nerve Glide  - 7 x weekly - 10 reps    Therapeutic Treatments and Modalities:     1. Manual Therapy (CPT 10062), see summary    2. E Stim Unattended (CPT 90550), IFC w/ MHP to lumbar in prone extension, x15', not today    3. Mechanical Traction (CPT 93928), Lumabr traction 80/60# intermittent 60/20\" w/ MHP, x15', supine    Therapeutic Treatment and Modalities Summary: L3-4, 4-5 CPA/LUPA gd 2-3; STM B paraspinals, SB traction - reports decreased L hip pain     Time-based treatments/modalities:    Physical Therapy Timed Treatment Charges  Manual therapy minutes (CPT 62807): 10 minutes  Therapeutic exercise minutes (CPT 36374): 30 " minutes        ASSESSMENT:   Response to treatment: Pt continues to demo extension bias, but is unable to fully centralize. Initiated mechanical traction w/ positive response. Pt reported decreased pain in L hip and LLE by end of session, but still not centralized. Pt is progressing at this time and appropriate to cont PT.    PLAN/RECOMMENDATIONS:   Plan for treatment: therapy treatment to continue next visit.  Planned interventions for next visit: continue with current treatment.

## 2024-11-18 ENCOUNTER — PHYSICAL THERAPY (OUTPATIENT)
Dept: PHYSICAL THERAPY | Facility: REHABILITATION | Age: 69
End: 2024-11-18
Attending: STUDENT IN AN ORGANIZED HEALTH CARE EDUCATION/TRAINING PROGRAM
Payer: COMMERCIAL

## 2024-11-18 DIAGNOSIS — M54.50 ACUTE RIGHT-SIDED LOW BACK PAIN, UNSPECIFIED WHETHER SCIATICA PRESENT: ICD-10-CM

## 2024-11-18 PROCEDURE — 97012 MECHANICAL TRACTION THERAPY: CPT

## 2024-11-18 PROCEDURE — 97110 THERAPEUTIC EXERCISES: CPT

## 2024-11-19 NOTE — OP THERAPY DAILY TREATMENT
"  Outpatient Physical Therapy  DAILY TREATMENT     [facility] Physical Therapy [address].  [address]  [place] [address]  Phone:  [phone]  Fax:  [phone]    Date: 11/18/2024    Patient: Nila Villatoro  YOB: 1955  MRN: [id number]     Time Calculation    Start time: 1630  Stop time: 1730 Time Calculation (min): 60 minutes         Chief Complaint: Back Problem    Visit #: 6    SUBJECTIVE:  : Nanda [id number]    Pt reports LBP has been improving. Feels 75% at functional level. Has finally been able to walk without leg pain. Reports having a lot relief after mechanical traction.    Aggs: sitting    OBJECTIVE:  Current objective measures:           Therapeutic Exercises (CPT 09940):     1. RONNIE w/ deep breathing, x3', reports decreased calf pain    2. Standing lumbar ext, x10    3. SB bridge, x10    4. Seated sciatic nerve glide, reviewed    5. Goblet sit<>stands 0->5#, 2x10    6. Pallof press w/ purple, x3' ea    7. Standing hip abd/ext w/ OTB, x10 ea    8. Rows w/ OTB, not today      Therapeutic Exercise Summary: Access Code: [email or web address]  URL: [email or web address]  Date: 10/21/2024  Prepared by: Madai Delaney    Exercises  - Standing Lumbar Extension  - 5 x daily - 7 x weekly - 10 reps  - Seated Slump Nerve Glide  - 5 x daily - 7 x weekly - 10 reps  - Supine Sciatic Nerve Glide  - 7 x weekly - 10 reps    Therapeutic Treatments and Modalities:     1. Mechanical Traction (CPT 63863), Lumbar traction 80/50# intermittent 60/20\" w/ MHP, x15', supine    Time-based treatments/modalities:    Physical Therapy Timed Treatment Charges  Therapeutic exercise minutes (CPT 28561): 40 minutes      ASSESSMENT:   Response to treatment: Pt demo improved activity tolerance, was able to complete all exercises w/o peripheralization of symptoms. Continued mechanical traction d/t positive response and decreased irritability. Pt is progressing at this time and " appropriate to cont PT.    PLAN/RECOMMENDATIONS:   Plan for treatment: therapy treatment to continue next visit.  Planned interventions for next visit: continue with current treatment.

## 2024-11-20 ENCOUNTER — HOSPITAL ENCOUNTER (OUTPATIENT)
Dept: LAB | Facility: MEDICAL CENTER | Age: 69
End: 2024-11-20
Payer: COMMERCIAL

## 2024-11-20 LAB
ALBUMIN SERPL BCP-MCNC: 3.9 G/DL (ref 3.2–4.9)
ALBUMIN/GLOB SERPL: 1.1 G/DL
ALP SERPL-CCNC: 144 U/L (ref 30–99)
ALT SERPL-CCNC: 14 U/L (ref 2–50)
ANION GAP SERPL CALC-SCNC: 11 MMOL/L (ref 7–16)
AST SERPL-CCNC: 25 U/L (ref 12–45)
BASOPHILS # BLD AUTO: 0.8 % (ref 0–1.8)
BASOPHILS # BLD: 0.05 K/UL (ref 0–0.12)
BILIRUB SERPL-MCNC: 1 MG/DL (ref 0.1–1.5)
BUN SERPL-MCNC: 13 MG/DL (ref 8–22)
CALCIUM ALBUM COR SERPL-MCNC: 9.4 MG/DL (ref 8.5–10.5)
CALCIUM SERPL-MCNC: 9.3 MG/DL (ref 8.5–10.5)
CHLORIDE SERPL-SCNC: 106 MMOL/L (ref 96–112)
CO2 SERPL-SCNC: 22 MMOL/L (ref 20–33)
CREAT SERPL-MCNC: 0.75 MG/DL (ref 0.5–1.4)
EOSINOPHIL # BLD AUTO: 0.12 K/UL (ref 0–0.51)
EOSINOPHIL NFR BLD: 1.9 % (ref 0–6.9)
ERYTHROCYTE [DISTWIDTH] IN BLOOD BY AUTOMATED COUNT: 44.8 FL (ref 35.9–50)
GFR SERPLBLD CREATININE-BSD FMLA CKD-EPI: 87 ML/MIN/1.73 M 2
GLOBULIN SER CALC-MCNC: 3.6 G/DL (ref 1.9–3.5)
GLUCOSE SERPL-MCNC: 90 MG/DL (ref 65–99)
HBV CORE AB SERPL QL IA: NONREACTIVE
HBV SURFACE AB SERPL IA-ACNC: <3.5 MIU/ML (ref 0–10)
HBV SURFACE AG SER QL: NORMAL
HCT VFR BLD AUTO: 41.7 % (ref 37–47)
HCV AB SER QL: NORMAL
HGB BLD-MCNC: 13.1 G/DL (ref 12–16)
IMM GRANULOCYTES # BLD AUTO: 0.01 K/UL (ref 0–0.11)
IMM GRANULOCYTES NFR BLD AUTO: 0.2 % (ref 0–0.9)
LYMPHOCYTES # BLD AUTO: 2.23 K/UL (ref 1–4.8)
LYMPHOCYTES NFR BLD: 35.2 % (ref 22–41)
MCH RBC QN AUTO: 27.8 PG (ref 27–33)
MCHC RBC AUTO-ENTMCNC: 31.4 G/DL (ref 32.2–35.5)
MCV RBC AUTO: 88.5 FL (ref 81.4–97.8)
MONOCYTES # BLD AUTO: 0.48 K/UL (ref 0–0.85)
MONOCYTES NFR BLD AUTO: 7.6 % (ref 0–13.4)
NEUTROPHILS # BLD AUTO: 3.45 K/UL (ref 1.82–7.42)
NEUTROPHILS NFR BLD: 54.3 % (ref 44–72)
NRBC # BLD AUTO: 0 K/UL
NRBC BLD-RTO: 0 /100 WBC (ref 0–0.2)
PLATELET # BLD AUTO: 266 K/UL (ref 164–446)
PMV BLD AUTO: 11.2 FL (ref 9–12.9)
POTASSIUM SERPL-SCNC: 4 MMOL/L (ref 3.6–5.5)
PROT SERPL-MCNC: 7.5 G/DL (ref 6–8.2)
RBC # BLD AUTO: 4.71 M/UL (ref 4.2–5.4)
SODIUM SERPL-SCNC: 139 MMOL/L (ref 135–145)
T4 FREE SERPL-MCNC: 1.13 NG/DL (ref 0.93–1.7)
TSH SERPL-ACNC: 1.1 UIU/ML (ref 0.35–5.5)
WBC # BLD AUTO: 6.3 K/UL (ref 4.8–10.8)

## 2024-11-20 PROCEDURE — 82103 ALPHA-1-ANTITRYPSIN TOTAL: CPT

## 2024-11-20 PROCEDURE — 86708 HEPATITIS A ANTIBODY: CPT

## 2024-11-20 PROCEDURE — 86704 HEP B CORE ANTIBODY TOTAL: CPT | Mod: GA

## 2024-11-20 PROCEDURE — 84443 ASSAY THYROID STIM HORMONE: CPT | Mod: GA

## 2024-11-20 PROCEDURE — 85025 COMPLETE CBC W/AUTO DIFF WBC: CPT

## 2024-11-20 PROCEDURE — 86706 HEP B SURFACE ANTIBODY: CPT | Mod: GA

## 2024-11-20 PROCEDURE — 87340 HEPATITIS B SURFACE AG IA: CPT | Mod: GA

## 2024-11-20 PROCEDURE — 36415 COLL VENOUS BLD VENIPUNCTURE: CPT

## 2024-11-20 PROCEDURE — 81332 SERPINA1 GENE: CPT

## 2024-11-20 PROCEDURE — 82104 ALPHA-1-ANTITRYPSIN PHENO: CPT

## 2024-11-20 PROCEDURE — 86803 HEPATITIS C AB TEST: CPT

## 2024-11-20 PROCEDURE — 84439 ASSAY OF FREE THYROXINE: CPT | Mod: GA

## 2024-11-20 PROCEDURE — 80053 COMPREHEN METABOLIC PANEL: CPT

## 2024-11-22 ENCOUNTER — PHYSICAL THERAPY (OUTPATIENT)
Dept: PHYSICAL THERAPY | Facility: REHABILITATION | Age: 69
End: 2024-11-22
Attending: STUDENT IN AN ORGANIZED HEALTH CARE EDUCATION/TRAINING PROGRAM
Payer: COMMERCIAL

## 2024-11-22 DIAGNOSIS — I10 ESSENTIAL HYPERTENSION: ICD-10-CM

## 2024-11-22 DIAGNOSIS — M54.50 ACUTE RIGHT-SIDED LOW BACK PAIN, UNSPECIFIED WHETHER SCIATICA PRESENT: ICD-10-CM

## 2024-11-22 PROCEDURE — 97012 MECHANICAL TRACTION THERAPY: CPT

## 2024-11-22 PROCEDURE — 97110 THERAPEUTIC EXERCISES: CPT

## 2024-11-22 RX ORDER — METOPROLOL SUCCINATE 25 MG/1
25 TABLET, EXTENDED RELEASE ORAL
Qty: 90 TABLET | Refills: 3 | Status: SHIPPED | OUTPATIENT
Start: 2024-11-22

## 2024-11-22 NOTE — OP THERAPY DAILY TREATMENT
"  Outpatient Physical Therapy  DAILY TREATMENT     [facility] Physical [address].  [address]  [place] [address]  Phone:  [phone]  Fax:  [phone]    Date: 11/22/2024    Patient: Nila Villatoro  YOB: 1955  MRN: [id number]     Time Calculation    Start time: 1631  Stop time: 1730 Time Calculation (min): 59 minutes         Chief Complaint: Back Problem    Visit #: 7    SUBJECTIVE:  Pt reports increased LLE pain today that travels all the way to the ankle. Describes as a burning pain. Last night woke 3x d/t pain, but reports waking at night is intermittent. Continues to have improved walking tolerance.    Aggs: bending fwd  Easers: nerve glide    OBJECTIVE:  Current objective measures: See progress note  Oswestry Low Back Pain Disability Total Score: 24       Therapeutic Exercises (CPT 90018):     1. RONNIE w/ deep breathing, NT    2. Standing lumbar ext, NT    3. SB bridge, 10x5\" hold    4. Seated sciatic nerve glide, x10 ea    5. Goblet sit<>stands 0->5#, NT    6. Pallof press w/ purple, NT    7. Standing hip abd/ext w/ OTB, NT    10. SB 3 way prayer stretch, 2x2'    11. SB DKTC, x2'      Therapeutic Exercise Summary: Access Code: [email or web address]  URL: [email or web address]  Date: 10/21/2024  Prepared by: Madai Delaney    Exercises  - Standing Lumbar Extension  - 5 x daily - 7 x weekly - 10 reps  - Seated Slump Nerve Glide  - 5 x daily - 7 x weekly - 10 reps  - Supine Sciatic Nerve Glide  - 7 x weekly - 10 reps    Therapeutic Treatments and Modalities:     1. Mechanical Traction (CPT 69848), Lumbar traction 80/50# intermittent 60/20\" w/ MHP, x15', supine    Time-based treatments/modalities:    Physical Therapy Timed Treatment Charges  Therapeutic exercise minutes (CPT 08789): 40 minutes        ASSESSMENT:   Response to treatment: See progress note    PLAN/RECOMMENDATIONS:   Plan for treatment: therapy treatment to continue next visit.  Planned interventions " for next visit: continue with current treatment.

## 2024-11-22 NOTE — TELEPHONE ENCOUNTER
Received request via: Pharmacy    Was the patient seen in the last year in this department? Yes    Does the patient have an active prescription (recently filled or refills available) for medication(s) requested? No    Pharmacy Name: [facility]    Does the patient have long term Plus and need 100-day supply? (This applies to ALL medications) Patient does not have SCP

## 2024-11-23 LAB — HAV AB SER QL IA: POSITIVE

## 2024-11-23 NOTE — OP THERAPY PROGRESS SUMMARY
Outpatient Physical Therapy  PROGRESS SUMMARY NOTE      [facility] Physical Therapy [address].  [address]  [place] [address]  Phone:  [phone]  Fax:  [phone]    Date of Visit: 11/22/2024    Patient: Nila Villatoro  YOB: 1955  MRN: [id number]     Referring Provider: Teresa Gonzales M.D.  [address] [place] [address]   Referring Diagnosis Low back pain, unspecified [M54.50]     Visit Diagnoses     ICD-10-CM   1. Acute right-sided low back pain, unspecified whether sciatica present  M54.50       Rehab Potential: fair to good    Progress Report Period: 10/21/24 to 11/22/24    Functional Assessment Used  Oswestry Low Back Pain Disability Total Score: 24       Objective Findings and Assessment:   Patient progression towards goals: Pt demo overall functional improvements and reports significant improved walking tolerance. Today reports increased radicular symptoms, but was able to reduce with repeated flexion activities. Previously demo extension bias. Continued mechanical traction with positive response, reporting full centralization by end of session. Impairments continue to include difficulty performing work and daily tasks. The patient will benefit from continued skilled therapy with focus on strength and ROM deficits in order to increase participation with daily tasks. The pt states she understands and agrees to the POC.     Objective findings and assessment details: Lumbar ROM:  Flex: WFL  Ext: decreased    URMILA: no change  RFIS: reduces  RFIL: reduces    Goals:   Short Term Goals:   1. Pt will be independent with HEP 3-5x per week for improving strength, ROM and decreasing pain - met  2. Pt will report ability to tolerate standing/walking x 5 minutes with pain 4/10 or less in order to complete work and daily tasks - met  3. Pt will demo proper squat mechanics in order to  objects from the ground - met, cont  Short term goal time span:  2-4 weeks       Long Term Goals:    1. Pt will demo lumbar ROM WFL in order to perform ADLs with pain 4/10 or less - partially met, cont  2. Pt will demo improved global hip strength with bridge 2 minutes or better for improved stability and decreased pain - not assessed  3. Pt will report ability to tolerate standing/walking x 10 minutes with pain 4/10 or less in order to complete work tasks - met  4. Pt will report ability to drive x 30 minutes without peripheralization of symptoms for improve community mobility - not met  Long term goal time span:  4-6 weeks    Plan:   Planned therapy interventions:  E Stim Unattended (CPT 78861), Gait Training (CPT 89485), Manual Therapy (CPT 98397), Mechanical Traction (CPT 73432), Neuromuscular Re-education (CPT 65989), Self Care ADL Training (CPT 97781), Therapeutic Activities (CPT 75119) and Therapeutic Exercise (CPT 36002)  Frequency: 1-2x week.  Duration in visits:  5      Referring provider co-signature:  I have reviewed this plan of care and my co-signature certifies the need for services.     Certification Period: 11/22/2024 to 01/03/25    Physician Signature: ________________________________ Date: ______________

## 2024-11-25 LAB
A1AT PHENOTYP SERPL-IMP: NORMAL
A1AT SERPL-MCNC: 158 MG/DL (ref 90–200)
A1AT SS SERPL-MCNC: NEGATIVE G/L
A1AT SZ SERPL-MCNC: NORMAL G/L
A1AT ZZ SERPL-MCNC: NEGATIVE G/L
SPECIMEN SOURCE: NORMAL

## 2024-11-27 ENCOUNTER — OFFICE VISIT (OUTPATIENT)
Dept: MEDICAL GROUP | Facility: PHYSICIAN GROUP | Age: 69
End: 2024-11-27
Payer: COMMERCIAL

## 2024-11-27 VITALS
HEIGHT: 63 IN | HEART RATE: 61 BPM | TEMPERATURE: 97.6 F | BODY MASS INDEX: 33.84 KG/M2 | WEIGHT: 191 LBS | OXYGEN SATURATION: 97 %

## 2024-11-27 DIAGNOSIS — R25.1 TREMOR: ICD-10-CM

## 2024-11-27 DIAGNOSIS — R76.8 HEPATITIS A ANTIBODY POSITIVE: ICD-10-CM

## 2024-11-27 PROCEDURE — 99214 OFFICE O/P EST MOD 30 MIN: CPT | Performed by: STUDENT IN AN ORGANIZED HEALTH CARE EDUCATION/TRAINING PROGRAM

## 2024-11-27 ASSESSMENT — FIBROSIS 4 INDEX: FIB4 SCORE: 1.71

## 2024-11-27 ASSESSMENT — ENCOUNTER SYMPTOMS
PALPITATIONS: 0
CHILLS: 0
FEVER: 0
SHORTNESS OF BREATH: 0

## 2024-11-27 NOTE — PROGRESS NOTES
Subjective:   Verbal consent was acquired by the patient to use bookletmobile ambient listening note generation during this visit Yes     CC: Lab results    History of Present Illness  Ms. Rodríguez is a pleasant 69-year-old who presents today to discuss recent lab work that was ordered through her gastroenterologist. : Jazmyn ([id number])    She reports experiencing headaches and fevers, particularly at night. She also mentions a burning sensation and diarrhea. She has been experiencing nausea and abdominal pain for some time, which intensifies after eating. She recently traveled to [place] from 11/08/2024 to 11/12/2024 and fell ill upon her return. She notes that her stools are discolored, though not cream-colored, and reports no darkening of her urine. She is unsure if she has received the hepatitis A vaccine in the past. She is curious about the potential contagiousness of her condition to her family and whether she can take Tylenol for her headaches. Additionally, she mentions that her alkaline phosphatase levels were elevated in a recent liver test. She has an upcoming MRI scheduled for 12/10/2024 or 12/11/2024.    She has been experiencing a tremor in her finger since 10/2024. Despite receiving a steroid injection from an orthopedic doctor, she feels the tremor has worsened. She was prescribed medication by a neurologist, but she discontinued it due to adverse effects.    She reports that her cholesterol medication, as well as other medications, have been causing her significant allergic reactions.    Patient Active Problem List    Diagnosis Date Noted    Common bile duct dilation 07/08/2024    Lung nodule seen on imaging study 07/08/2024    Hashimoto's disease 07/08/2024    Sore throat 05/14/2024    JOEY I (cervical intraepithelial neoplasia I) 12/29/2022    Chronic left-sided low back pain with sciatica 07/23/2020    Herniation of left side of L4-L5 intervertebral disc 07/23/2020    Hypertension   "   Cervical high risk HPV (human papillomavirus) test positive 10/29/2019    Epigastric pain 10/01/2019    Pelvic pain in female 06/21/2018    Mammographic microcalcification on right breast and biopsy on 4/1/16 ruled out malignancy  06/02/2016    Dyslipidemia 02/10/2016       Health Maintenance: Completed    ROS:  Review of Systems   Constitutional:  Negative for chills and fever.   Respiratory:  Negative for shortness of breath.    Cardiovascular:  Negative for chest pain and palpitations.       Objective:     Exam:  Pulse 61   Temp 36.4 °C (97.6 °F)   Ht 1.6 m (5' 3\")   Wt 86.6 kg (191 lb)   LMP  (LMP Unknown)   SpO2 97%   BMI 33.83 kg/m²  Body mass index is 33.83 kg/m².    Physical Exam  Constitutional:       Appearance: Normal appearance.   Cardiovascular:      Rate and Rhythm: Normal rate and regular rhythm.      Heart sounds: Normal heart sounds.   Pulmonary:      Effort: Pulmonary effort is normal. No respiratory distress.      Breath sounds: Normal breath sounds. No stridor. No wheezing, rhonchi or rales.   Musculoskeletal:      Comments: Right hand tremor   Neurological:      Mental Status: She is alert.           Labs:    Latest Reference Range & Units 11/20/24 10:49   WBC 4.8 - 10.8 K/uL 6.3   RBC 4.20 - 5.40 M/uL 4.71   Hemoglobin 12.0 - 16.0 g/dL 13.1   Hematocrit 37.0 - 47.0 % 41.7   MCV 81.4 - 97.8 fL 88.5   MCH 27.0 - 33.0 pg 27.8   MCHC 32.2 - 35.5 g/dL 31.4 (L)   RDW 35.9 - 50.0 fL 44.8   Platelet Count 164 - 446 K/uL 266   MPV 9.0 - 12.9 fL 11.2   Neutrophils-Polys 44.00 - 72.00 % 54.30   Neutrophils (Absolute) 1.82 - 7.42 K/uL 3.45   Lymphocytes 22.00 - 41.00 % 35.20   Lymphs (Absolute) 1.00 - 4.80 K/uL 2.23   Monocytes 0.00 - 13.40 % 7.60   Monos (Absolute) 0.00 - 0.85 K/uL 0.48   Eosinophils 0.00 - 6.90 % 1.90   Eos (Absolute) 0.00 - 0.51 K/uL 0.12   Basophils 0.00 - 1.80 % 0.80   Baso (Absolute) 0.00 - 0.12 K/uL 0.05   Immature Granulocytes 0.00 - 0.90 % 0.20   Immature Granulocytes " (abs) 0.00 - 0.11 K/uL 0.01   Nucleated RBC 0.00 - 0.20 /100 WBC 0.00   NRBC (Absolute) K/uL 0.00   Sodium 135 - 145 mmol/L 139   Potassium 3.6 - 5.5 mmol/L 4.0   Chloride 96 - 112 mmol/L 106   Co2 20 - 33 mmol/L 22   Anion Gap 7.0 - 16.0  11.0   Glucose 65 - 99 mg/dL 90   Bun 8 - 22 mg/dL 13   Creatinine 0.50 - 1.40 mg/dL 0.75   GFR (CKD-EPI) >60 mL/min/1.73 m 2 87   Calcium 8.5 - 10.5 mg/dL 9.3   Correct Calcium 8.5 - 10.5 mg/dL 9.4   AST(SGOT) 12 - 45 U/L 25   ALT(SGPT) 2 - 50 U/L 14   Alkaline Phosphatase 30 - 99 U/L 144 (H)   Total Bilirubin 0.1 - 1.5 mg/dL 1.0   Albumin 3.2 - 4.9 g/dL 3.9   Total Protein 6.0 - 8.2 g/dL 7.5   Globulin 1.9 - 3.5 g/dL 3.6 (H)   A-G Ratio g/dL 1.1   Alpha-1-Antitrypsin 90 - 200 mg/dL 158   Hep A Virus Antibody Total Negative  Positive !   Hep B Surface Antibody Quant 0.00 - 10.00 mIU/mL <3.50   Hepatitis B Surface Antigen Non-Reactive  Non-Reactive   Hepatitis B Core Ab, Total Non-Reactive  NonReactive   Hepatitis C Antibody Non-Reactive  Non-Reactive   A1A Genotype Specimen  Whole Blood   A1A S [place]  Negative   A1A Z [place]  Negative   A1A Phenotype  Not Applicable   A1A Interpretation  See Note   Free T-4 0.93 - 1.70 ng/dL 1.13   TSH 0.350 - 5.500 uIU/mL 1.100   (L): Data is abnormally low  (H): Data is abnormally high  !: Data is abnormal    Assessment & Plan:     69 y.o. female with the following -     1. Hepatitis A antibody positive  Patient had labs completed through her gastroenterologist on 11/20/2024.  Hepatitis A antibody returned positive.  Patient reports prior to having the labs completed she had been in [place] for 2 weeks.  Patient is currently complaining of right upper quadrant abdominal pain and nausea.  It is unclear if patient currently has active hepatitis C infection due to patient previously having right upper quadrant abdominal pain and nausea.  When asked patient did say her stools appear to be georgette colored.  Patient does not recall having hepatitis A  vaccination in the past.  I explained to the patient that if she does have active hepatitis A infection there is no treatment with medications and treatment is supportive.  She was advised to rest, eat healthy, and stay well-hydrated.  Patient has been practicing good hand hygiene.  I also explained to the patient that she should schedule an appointment with her gastroenterologist to discuss the lab in more detail.  All other labs were normal.  Patient to schedule with her gastroenterologist.    2. Tremor  Chronic, ongoing.  Patient is established with neurology.  Patient was prescribed carbidopa-levodopa  mg by her neurologist however patient has discontinued the medication due to side effects.  I explained to the patient that she should schedule a follow-up appointment with her neurologist to discuss other medication options.  Patient verbalized understanding.      My total time spent caring for the patient on the day of the encounter was 33 minutes.   This does not include time spent on separately billable procedures/tests.      Return if symptoms worsen or fail to improve.    Please note that this dictation was created using voice recognition software. I have made every reasonable attempt to correct obvious errors, but I expect that there are errors of grammar and possibly content that I did not discover before finalizing the note.

## 2024-12-11 ENCOUNTER — HOSPITAL ENCOUNTER (OUTPATIENT)
Dept: RADIOLOGY | Facility: MEDICAL CENTER | Age: 69
End: 2024-12-11
Payer: COMMERCIAL

## 2024-12-11 DIAGNOSIS — K83.8 COMMON BILE DUCT DILATATION: ICD-10-CM

## 2024-12-11 DIAGNOSIS — R74.8 ALKALINE PHOSPHATASE ELEVATION: ICD-10-CM

## 2024-12-11 DIAGNOSIS — R10.11 ABDOMINAL PAIN, RIGHT UPPER QUADRANT: ICD-10-CM

## 2024-12-11 PROCEDURE — 74181 MRI ABDOMEN W/O CONTRAST: CPT

## 2024-12-12 ENCOUNTER — OFFICE VISIT (OUTPATIENT)
Dept: SPORTS MEDICINE | Facility: OTHER | Age: 69
End: 2024-12-12
Payer: COMMERCIAL

## 2024-12-12 ENCOUNTER — APPOINTMENT (OUTPATIENT)
Dept: RADIOLOGY | Facility: OTHER | Age: 69
End: 2024-12-12
Attending: FAMILY MEDICINE
Payer: COMMERCIAL

## 2024-12-12 VITALS
TEMPERATURE: 98.4 F | WEIGHT: 191 LBS | HEIGHT: 63 IN | OXYGEN SATURATION: 94 % | SYSTOLIC BLOOD PRESSURE: 110 MMHG | RESPIRATION RATE: 18 BRPM | BODY MASS INDEX: 33.84 KG/M2 | HEART RATE: 71 BPM | DIASTOLIC BLOOD PRESSURE: 74 MMHG

## 2024-12-12 DIAGNOSIS — M17.11 LOCALIZED OSTEOARTHRITIS OF RIGHT KNEE: ICD-10-CM

## 2024-12-12 DIAGNOSIS — G89.29 CHRONIC PAIN OF RIGHT KNEE: ICD-10-CM

## 2024-12-12 DIAGNOSIS — M51.26 HERNIATION OF LEFT SIDE OF L4-L5 INTERVERTEBRAL DISC: ICD-10-CM

## 2024-12-12 DIAGNOSIS — Z98.890 HISTORY OF LUMBAR LAMINECTOMY: ICD-10-CM

## 2024-12-12 DIAGNOSIS — M25.561 CHRONIC PAIN OF RIGHT KNEE: ICD-10-CM

## 2024-12-12 PROCEDURE — 20610 DRAIN/INJ JOINT/BURSA W/O US: CPT | Mod: RT | Performed by: FAMILY MEDICINE

## 2024-12-12 PROCEDURE — 99214 OFFICE O/P EST MOD 30 MIN: CPT | Mod: 25 | Performed by: FAMILY MEDICINE

## 2024-12-12 PROCEDURE — 73564 X-RAY EXAM KNEE 4 OR MORE: CPT | Mod: TC,FY,RT | Performed by: RADIOLOGY

## 2024-12-12 PROCEDURE — 3078F DIAST BP <80 MM HG: CPT | Performed by: FAMILY MEDICINE

## 2024-12-12 PROCEDURE — 3074F SYST BP LT 130 MM HG: CPT | Performed by: FAMILY MEDICINE

## 2024-12-12 RX ORDER — TRIAMCINOLONE ACETONIDE 40 MG/ML
40 INJECTION, SUSPENSION INTRA-ARTICULAR; INTRAMUSCULAR ONCE
Status: COMPLETED | OUTPATIENT
Start: 2024-12-12 | End: 2024-12-12

## 2024-12-12 RX ADMIN — TRIAMCINOLONE ACETONIDE 40 MG: 40 INJECTION, SUSPENSION INTRA-ARTICULAR; INTRAMUSCULAR at 12:10

## 2024-12-12 ASSESSMENT — FIBROSIS 4 INDEX: FIB4 SCORE: 1.71

## 2024-12-12 NOTE — PROGRESS NOTES
Chief Complaint   Patient presents with    Knee Pain     R knee pain      CHIEF COMPLAINT:  Nila Villatoro female presenting at the request of Self-referred for evaluation of knee pain.     Nila Villatoro is complaining of right knee pain  Worse for 1 week  Initial pain 6 yrs or so (roughly back in 2018)  Pain is at the lateral knee  Quality is sharp  Pain is non-radiating   Improved with resting and compression  Aggravated by walking  no prior problems with this area in the past   Prior Treatments:  none   Prior studies: NO Prior imaging has been done   Medications tried for pain include: none  Mechanical Symptom history: No Locking and Grinding    Patient does have POSITIVE history of lumbar spine pain with history of lumbar laminectomy and discectomy  Fortunately, she has not experienced any significant weakness in the leg and she denies any radicular symptoms    TJ Max (back room clothing re-racking)    REVIEW OF SYSTEMS  No Nausea, No Vomiting, No Chest Pain, No Shortness of Breath, No Dizziness, No Headache    PAST MEDICAL HISTORY:   History reviewed. No pertinent past medical history.    PMH:  has a past medical history of Gastritis, High cholesterol, Hyperlipidemia, Hypertension, Pain, PONV (postoperative nausea and vomiting), RLS (restless legs syndrome) (12/29/2022), and Venous insufficiency (12/09/2016).  MEDS:   Current Outpatient Medications:     metoprolol SR (TOPROL XL) 25 MG TABLET SR 24 HR, TOME BRANDY TABLETA TODOS [place], Disp: 90 Tablet, Rfl: 3    carbidopa-levodopa (SINEMET)  MG Tab, Take 1 Tablet by mouth 3 times a day., Disp: , Rfl:     gabapentin (NEURONTIN) 600 MG tablet, Take 600 mg by mouth 3 times a day., Disp: , Rfl:     diclofenac sodium (VOLTAREN) 1 % Gel, Apply 2 g topically 4 times a day as needed (for right arm pain)., Disp: 350 g, Rfl: 2    atorvastatin (LIPITOR) 20 MG Tab, TOME 1 TABLETA POR VIA ORAL TODOS [place], Disp: 90 Tablet, Rfl: 3     "levothyroxine (SYNTHROID) 25 MCG Tab, Take 1 Tablet by mouth every morning on an empty stomach., Disp: 90 Tablet, Rfl: 1    triamcinolone acetonide (KENALOG) 0.1 % Cream, Apply 1 Application topically 2 times a day., Disp: 30 g, Rfl: 0    methylPREDNISolone (MEDROL) 4 MG Tab, On day 1 take 6 tablets by mouth, then day 2 take 5 tablets by mouth, then day 3 take 4 tablets by mouth, then day 4 take 3 tablets by mouth, then day 5 take 2 tablets by mouth, then 6 take 1 tablet by mouth, Disp: 21 Tablet, Rfl: 0    Current Facility-Administered Medications:     triamcinolone acetonide (Kenalog-40) injection 40 mg, 40 mg, Intra-articular, Once,   ALLERGIES:   Allergies   Allergen Reactions    Cephalexin Hives and Itching     HIVES 6/6/24    Penicillins Hives     4/2022: tolerated cefazolin     SURGHX:   Past Surgical History:   Procedure Laterality Date    LEEP N/A 11/2022    LUMBAR LAMINECTOMY DISKECTOMY  04/26/2022    Procedure: LAMINECTOMY, SPINE, LUMBAR, WITHL DISCECTOMY - L5 WITH L4-S1 MEDIAL FACETECTOMY;  Surgeon: Duane Weiss M.D.;  Location: SURGERY [facility];  Service: Neurosurgery    FORAMINOTOMY  04/26/2022    Procedure: FORAMINOTOMY, SPINE;  Surgeon: Duane Weiss M.D.;  Location: SURGERY [facility];  Service: Neurosurgery    CHOLECYSTECTOMY  01/01/2009    BREAST BIOPSY      PRIMARY C SECTION      3 times, last one in 1986    US-NEEDLE CORE BX-BREAST PANEL Left      SOCHX:  reports that she has never smoked. She has never used smokeless tobacco. She reports that she does not currently use alcohol. She reports that she does not use drugs.  FH: Family history was reviewed, no pertinent findings to report     PHYSICAL EXAM:  /74 (BP Location: Left arm, Patient Position: Sitting, BP Cuff Size: Adult)   Pulse 71   Temp 36.9 °C (98.4 °F) (Temporal)   Resp 18   Ht 1.6 m (5' 3\")   Wt 86.6 kg (191 lb)   LMP  (LMP Unknown)   SpO2 94%   BMI 33.83 kg/m²      well-developed, well-nourished in no " apparent distress, alert and oriented x 3.  Gait: antalgic    RIGHT Knee:  Slight Varus and No Swelling  Range of Motion Slightly limited with Flexion  1+ effusion  Patellar No tenderness and no apprehension  Medial Joint Line Non-tender and NEGATIVE Valencia  Lateral Joint Line Tenderness and NEGATIVE Valencia  Trace Laxity with Varus stress  Trace Laxity with Valgus stress  Lachman's testing is Trace  Posterior Drawer Testing is Trace  The leg is otherwise neurovascularly intact    LEFT Knee:  Slight Varus and No Swelling   Range of Motion Intact  Trace effusion  Patellar No tenderness and no apprehension  Medial Joint Line Non-tender and NEGATIVE Valencia  Lateral Joint Line Non-tender and NEGATIVE Valencia  Trace Laxity with Varus stress  Trace Laxity with Valgus stress  Lachman's testing is Trace  Posterior Drawer Testing is Trace  The leg is otherwise neurovascularly intact    Additional Findings: None      1. Localized osteoarthritis of right knee  DX-KNEE COMPLETE 4+ RIGHT    triamcinolone acetonide (Kenalog-40) injection 40 mg      2. Herniation of left side of L4-L5 intervertebral disc        3. History of lumbar laminectomy          right knee pain  Worse for 1 week  Initial pain 6 yrs or so (roughly back in 2018)    Her pain seems to be localized at the knee  Mostly at the lateral side  Her hip exam is relatively benign  She does have a history of lumbar laminectomy, she does have some lower lumbar achiness, but she has NEGATIVE slump test and no notable weakness in the legs    I think that her pain is probably coming directly from the knee and related to her osteoarthritis    Discussed knee osteoarthritis management options includin.  Joint protection  2.  Non-offending activities  3.  Consider Knee bracing  4. Injection options including corticosteroid    Patient has elected to proceed with RIGHT knee intra-articular corticosteroid injection which was performed in the office TODAY (  2024)    Return in about 4 weeks (around 1/9/2025).  To see how she is doing with her right knee pain and after RIGHT knee intra-articular corticosteroid injection at that point            12/12/2024 10:28 AM     HISTORY/REASON FOR EXAM:  Atraumatic Pain/Swelling/Deformity; chronic R knee pain.     TECHNIQUE/EXAM DESCRIPTION AND NUMBER OF VIEWS:  4 views of the RIGHT knee.     COMPARISON: 4/4/2021     FINDINGS:  No focal soft tissue swelling or gross joint effusion.  Minimal loss of joint space primarily in the medial compartment.  Small osteophytes at the articular margins.  No fracture or dislocation.     IMPRESSION:     1.  No fracture or dislocation of RIGHT knee  2.  Mild degenerative changes.        Exam Ended: 12/12/24 10:28 AM Last Resulted: 12/12/24 10:41 AM         Interpreted in the office today with the patient    Self-referred

## 2024-12-16 ENCOUNTER — PHYSICAL THERAPY (OUTPATIENT)
Dept: PHYSICAL THERAPY | Facility: REHABILITATION | Age: 69
End: 2024-12-16
Attending: FAMILY MEDICINE
Payer: COMMERCIAL

## 2024-12-16 DIAGNOSIS — M54.50 ACUTE RIGHT-SIDED LOW BACK PAIN, UNSPECIFIED WHETHER SCIATICA PRESENT: ICD-10-CM

## 2024-12-16 PROCEDURE — 97012 MECHANICAL TRACTION THERAPY: CPT | Mod: 59

## 2024-12-16 PROCEDURE — 97110 THERAPEUTIC EXERCISES: CPT

## 2024-12-16 PROCEDURE — 97140 MANUAL THERAPY 1/> REGIONS: CPT

## 2024-12-16 NOTE — OP THERAPY DAILY TREATMENT
"  Outpatient Physical Therapy  DAILY TREATMENT     [facility] [address]  [address]  [place] [address]  Phone:  [phone]  Fax:  [phone]    Date: 12/16/2024    Patient: Nila Villatoro  YOB: 1955  MRN: [id number]     Time Calculation    Start time: 1500  Stop time: 1605 Time Calculation (min): 65 minutes         Chief Complaint: Back Problem    Visit #: 8    SUBJECTIVE:  Pt reports LBP is improving, now with pain only radiating to the L side of the back. Currently has no leg pain. Reports intermittent symptoms in the L calf, but are less frequent.    Aggs: sitting    OBJECTIVE:  Current objective measures:           Therapeutic Exercises (CPT 51290):     1. RONNIE w/ deep breathing, x5'    2. Standing lumbar ext, x10, w/ OP    3. SB bridge, 10x5\" hold, 1x fatigue    4. Seated sciatic nerve glide, x10 ea    5. Goblet sit<>stands 5#, x10    6. Pallof press w/ purple, NT    7. Standing hip abd/ext w/ OTB, reviewed    8. Daniel curl 10#, x10      Therapeutic Exercise Summary: Access Code: [email or web address]  URL: [email or web address]  Date: 10/21/2024  Prepared by: Madai Delaney    Exercises  - Standing Lumbar Extension  - 5 x daily - 7 x weekly - 10 reps  - Seated Slump Nerve Glide  - 5 x daily - 7 x weekly - 10 reps  - Supine Sciatic Nerve Glide  - 7 x weekly - 10 reps    Therapeutic Treatments and Modalities:     1. Mechanical Traction (CPT 22829), Lumbar traction 80/50# intermittent 60/20\" w/ MHP, x15', supine    2. Manual Therapy (CPT 04789), L UPA/CPA L4-5, S1 gd 1-2 (reports radiating symptoms to glute); IASTM lumbar paraspinals    Time-based treatments/modalities:    Physical Therapy Timed Treatment Charges  Manual therapy minutes (CPT 63877): 15 minutes  Therapeutic exercise minutes (CPT 36115): 27 minutes        ASSESSMENT:   Response to treatment: Progressed core strength and stability exercises with increased " resistance and difficulty. Pt demo overall good activity tolerance. Will likely DC in 2 visits. Pt is progressing at this time and appropriate to cont PT.    PLAN/RECOMMENDATIONS:   Plan for treatment: therapy treatment to continue next visit.  Planned interventions for next visit: continue with current treatment.

## 2025-01-01 DIAGNOSIS — E06.3 HASHIMOTO'S DISEASE: ICD-10-CM

## 2025-01-02 RX ORDER — LEVOTHYROXINE SODIUM 25 UG/1
TABLET ORAL
Qty: 90 TABLET | Refills: 1 | Status: SHIPPED | OUTPATIENT
Start: 2025-01-02

## 2025-01-09 ENCOUNTER — OFFICE VISIT (OUTPATIENT)
Dept: SPORTS MEDICINE | Facility: OTHER | Age: 70
End: 2025-01-09
Payer: COMMERCIAL

## 2025-01-09 VITALS
DIASTOLIC BLOOD PRESSURE: 70 MMHG | TEMPERATURE: 97.4 F | HEART RATE: 78 BPM | HEIGHT: 63 IN | WEIGHT: 191 LBS | RESPIRATION RATE: 16 BRPM | BODY MASS INDEX: 33.84 KG/M2 | OXYGEN SATURATION: 97 % | SYSTOLIC BLOOD PRESSURE: 116 MMHG

## 2025-01-09 DIAGNOSIS — M17.11 LOCALIZED OSTEOARTHRITIS OF RIGHT KNEE: ICD-10-CM

## 2025-01-09 DIAGNOSIS — Z98.890 HISTORY OF LUMBAR LAMINECTOMY: ICD-10-CM

## 2025-01-09 DIAGNOSIS — M51.26 HERNIATION OF LEFT SIDE OF L4-L5 INTERVERTEBRAL DISC: ICD-10-CM

## 2025-01-09 PROCEDURE — 99213 OFFICE O/P EST LOW 20 MIN: CPT | Performed by: FAMILY MEDICINE

## 2025-01-09 PROCEDURE — 3074F SYST BP LT 130 MM HG: CPT | Performed by: FAMILY MEDICINE

## 2025-01-09 PROCEDURE — 3078F DIAST BP <80 MM HG: CPT | Performed by: FAMILY MEDICINE

## 2025-01-09 ASSESSMENT — FIBROSIS 4 INDEX: FIB4 SCORE: 1.71

## 2025-01-09 NOTE — PROGRESS NOTES
"Chief Complaint   Patient presents with    Knee Pain     R knee pain      CHIEF COMPLAINT:  Nila Villatoro female presenting at the request of Self-referred for evaluation of knee pain.     Nila Villatoro is complaining of right knee pain  Worse for 1 week  Initial pain 6 yrs or so (roughly back in 2018)  Pain is at the lateral knee  Quality is sharp  Pain is non-radiating   Improved with resting and compression  Aggravated by walking  no prior problems with this area in the past   Prior Treatments: none   Prior studies: NO Prior imaging has been done   Medications tried for pain include: none  Mechanical Symptom history: No Locking and Grinding    Patient does have POSITIVE history of lumbar spine pain with history of lumbar laminectomy and discectomy  Fortunately, she has not experienced any significant weakness in the leg and she denies any radicular symptoms    UPDATE:  Lumbar pain is still present  Fortunately, her RIGHT knee pain has IMPROVED  She is still experiencing some medial knee pain  She is having difficulty doing extended walking/standing  She has been using functional knee brace which has helped  Knee swelling has come down    TJ Max (back room clothing re-racking)     PHYSICAL EXAM:  /70 (BP Location: Left arm, Patient Position: Sitting, BP Cuff Size: Large adult)   Pulse 78   Temp 36.3 °C (97.4 °F) (Temporal)   Resp 16   Ht 1.6 m (5' 3\")   Wt 86.6 kg (191 lb)   LMP  (LMP Unknown)   SpO2 97%   BMI 33.83 kg/m²      well-developed, well-nourished in no apparent distress, alert and oriented x 3.  Gait: antalgic    RIGHT Knee:  Slight Varus and No Swelling  Range of Motion Slightly limited with Flexion  1+ effusion  Patellar No tenderness and no apprehension  Medial Joint Line Non-tender and NEGATIVE Valencia  Lateral Joint Line Tenderness and NEGATIVE Valencia  Trace Laxity with Varus stress  Trace Laxity with Valgus stress  Lachman's testing is Trace  Posterior " Drawer Testing is Trace  The leg is otherwise neurovascularly intact    LEFT Knee:  Slight Varus and No Swelling   Range of Motion Intact  Trace effusion  Patellar No tenderness and no apprehension  Medial Joint Line Non-tender and NEGATIVE Valencia  Lateral Joint Line Non-tender and NEGATIVE Valencia  Trace Laxity with Varus stress  Trace Laxity with Valgus stress  Lachman's testing is Trace  Posterior Drawer Testing is Trace  The leg is otherwise neurovascularly intact    Additional Findings: None    1. Localized osteoarthritis of right knee        2. Herniation of left side of L4-L5 intervertebral disc        3. History of lumbar laminectomy          right knee pain  Worse for 1 week  Initial pain 6 yrs or so (roughly back in 2018)    Her pain seems to be localized at the knee  Mostly at the lateral side  Her hip exam is relatively benign  She does have a history of lumbar laminectomy, she does have some lower lumbar achiness, but she has NEGATIVE slump test and no notable weakness in the legs    RIGHT knee intra-articular corticosteroid injection performed (December 12, 2024) HELPED, but she is still struggling with some medial knee pain    She was provided with knee exercises to work on  Hopefully, with additional exercises and strengthening together with bracing for extended activity she will continue to improve    Return in about 8 weeks (around 3/6/2025).  To see how she is doing at that point            12/12/2024 10:28 AM     HISTORY/REASON FOR EXAM:  Atraumatic Pain/Swelling/Deformity; chronic R knee pain.     TECHNIQUE/EXAM DESCRIPTION AND NUMBER OF VIEWS:  4 views of the RIGHT knee.     COMPARISON: 4/4/2021     FINDINGS:  No focal soft tissue swelling or gross joint effusion.  Minimal loss of joint space primarily in the medial compartment.  Small osteophytes at the articular margins.  No fracture or dislocation.     IMPRESSION:     1.  No fracture or dislocation of RIGHT knee  2.  Mild degenerative  changes.        Exam Ended: 12/12/24 10:28 AM Last Resulted: 12/12/24 10:41 AM         Interpreted in the office today with the patient    Self-referred

## 2025-02-06 SDOH — ECONOMIC STABILITY: FOOD INSECURITY: WITHIN THE PAST 12 MONTHS, YOU WORRIED THAT YOUR FOOD WOULD RUN OUT BEFORE YOU GOT MONEY TO BUY MORE.: PATIENT DECLINED

## 2025-02-06 SDOH — HEALTH STABILITY: PHYSICAL HEALTH: ON AVERAGE, HOW MANY MINUTES DO YOU ENGAGE IN EXERCISE AT THIS LEVEL?: 60 MIN

## 2025-02-06 SDOH — ECONOMIC STABILITY: INCOME INSECURITY: HOW HARD IS IT FOR YOU TO PAY FOR THE VERY BASICS LIKE FOOD, HOUSING, MEDICAL CARE, AND HEATING?: NOT HARD AT ALL

## 2025-02-06 SDOH — ECONOMIC STABILITY: HOUSING INSECURITY
IN THE LAST 12 MONTHS, WAS THERE A TIME WHEN YOU DID NOT HAVE A STEADY PLACE TO SLEEP OR SLEPT IN A SHELTER (INCLUDING NOW)?: PATIENT DECLINED

## 2025-02-06 SDOH — HEALTH STABILITY: PHYSICAL HEALTH
ON AVERAGE, HOW MANY DAYS PER WEEK DO YOU ENGAGE IN MODERATE TO STRENUOUS EXERCISE (LIKE A BRISK WALK)?: PATIENT DECLINED

## 2025-02-06 SDOH — ECONOMIC STABILITY: INCOME INSECURITY: IN THE LAST 12 MONTHS, WAS THERE A TIME WHEN YOU WERE NOT ABLE TO PAY THE MORTGAGE OR RENT ON TIME?: PATIENT DECLINED

## 2025-02-06 SDOH — ECONOMIC STABILITY: FOOD INSECURITY: WITHIN THE PAST 12 MONTHS, THE FOOD YOU BOUGHT JUST DIDN'T LAST AND YOU DIDN'T HAVE MONEY TO GET MORE.: PATIENT DECLINED

## 2025-02-06 SDOH — ECONOMIC STABILITY: TRANSPORTATION INSECURITY
IN THE PAST 12 MONTHS, HAS THE LACK OF TRANSPORTATION KEPT YOU FROM MEDICAL APPOINTMENTS OR FROM GETTING MEDICATIONS?: PATIENT DECLINED

## 2025-02-06 SDOH — HEALTH STABILITY: MENTAL HEALTH
STRESS IS WHEN SOMEONE FEELS TENSE, NERVOUS, ANXIOUS, OR CAN'T SLEEP AT NIGHT BECAUSE THEIR MIND IS TROUBLED. HOW STRESSED ARE YOU?: ONLY A LITTLE

## 2025-02-06 ASSESSMENT — SOCIAL DETERMINANTS OF HEALTH (SDOH)
HOW OFTEN DO YOU GET TOGETHER WITH FRIENDS OR RELATIVES?: MORE THAN THREE TIMES A WEEK
IN THE PAST 12 MONTHS, HAS THE ELECTRIC, GAS, OIL, OR WATER COMPANY THREATENED TO SHUT OFF SERVICE IN YOUR HOME?: NO
HOW OFTEN DO YOU GET TOGETHER WITH FRIENDS OR RELATIVES?: MORE THAN THREE TIMES A WEEK
HOW OFTEN DO YOU ATTENT MEETINGS OF THE CLUB OR ORGANIZATION YOU BELONG TO?: NEVER
IN A TYPICAL WEEK, HOW MANY TIMES DO YOU TALK ON THE PHONE WITH FAMILY, FRIENDS, OR NEIGHBORS?: MORE THAN THREE TIMES A WEEK
HOW OFTEN DO YOU ATTEND CHURCH OR RELIGIOUS SERVICES?: NEVER
HOW OFTEN DO YOU ATTENT MEETINGS OF THE CLUB OR ORGANIZATION YOU BELONG TO?: NEVER
DO YOU BELONG TO ANY CLUBS OR ORGANIZATIONS SUCH AS CHURCH GROUPS UNIONS, FRATERNAL OR ATHLETIC GROUPS, OR SCHOOL GROUPS?: NO
WITHIN THE PAST 12 MONTHS, YOU WORRIED THAT YOUR FOOD WOULD RUN OUT BEFORE YOU GOT THE MONEY TO BUY MORE: PATIENT DECLINED
HOW OFTEN DO YOU HAVE A DRINK CONTAINING ALCOHOL: NEVER
DO YOU BELONG TO ANY CLUBS OR ORGANIZATIONS SUCH AS CHURCH GROUPS UNIONS, FRATERNAL OR ATHLETIC GROUPS, OR SCHOOL GROUPS?: NO
HOW OFTEN DO YOU HAVE SIX OR MORE DRINKS ON ONE OCCASION: NEVER
HOW MANY DRINKS CONTAINING ALCOHOL DO YOU HAVE ON A TYPICAL DAY WHEN YOU ARE DRINKING: PATIENT DOES NOT DRINK
IN A TYPICAL WEEK, HOW MANY TIMES DO YOU TALK ON THE PHONE WITH FAMILY, FRIENDS, OR NEIGHBORS?: MORE THAN THREE TIMES A WEEK
HOW OFTEN DO YOU ATTEND CHURCH OR RELIGIOUS SERVICES?: NEVER
HOW HARD IS IT FOR YOU TO PAY FOR THE VERY BASICS LIKE FOOD, HOUSING, MEDICAL CARE, AND HEATING?: NOT HARD AT ALL

## 2025-02-06 ASSESSMENT — LIFESTYLE VARIABLES
AUDIT-C TOTAL SCORE: 0
HOW MANY STANDARD DRINKS CONTAINING ALCOHOL DO YOU HAVE ON A TYPICAL DAY: PATIENT DOES NOT DRINK
HOW OFTEN DO YOU HAVE SIX OR MORE DRINKS ON ONE OCCASION: NEVER
SKIP TO QUESTIONS 9-10: 1
HOW OFTEN DO YOU HAVE A DRINK CONTAINING ALCOHOL: NEVER

## 2025-02-07 ENCOUNTER — OFFICE VISIT (OUTPATIENT)
Dept: MEDICAL GROUP | Facility: PHYSICIAN GROUP | Age: 70
End: 2025-02-07
Payer: MEDICARE

## 2025-02-07 VITALS
DIASTOLIC BLOOD PRESSURE: 82 MMHG | OXYGEN SATURATION: 98 % | WEIGHT: 188 LBS | HEART RATE: 60 BPM | SYSTOLIC BLOOD PRESSURE: 120 MMHG | HEIGHT: 63 IN | TEMPERATURE: 97.6 F | BODY MASS INDEX: 33.31 KG/M2

## 2025-02-07 DIAGNOSIS — Z00.00 WELLNESS EXAMINATION: ICD-10-CM

## 2025-02-07 DIAGNOSIS — Z23 NEED FOR VACCINATION: ICD-10-CM

## 2025-02-07 DIAGNOSIS — Z12.31 ENCOUNTER FOR SCREENING MAMMOGRAM FOR MALIGNANT NEOPLASM OF BREAST: ICD-10-CM

## 2025-02-07 PROCEDURE — 99397 PER PM REEVAL EST PAT 65+ YR: CPT | Mod: 25 | Performed by: STUDENT IN AN ORGANIZED HEALTH CARE EDUCATION/TRAINING PROGRAM

## 2025-02-07 PROCEDURE — 3079F DIAST BP 80-89 MM HG: CPT | Performed by: STUDENT IN AN ORGANIZED HEALTH CARE EDUCATION/TRAINING PROGRAM

## 2025-02-07 PROCEDURE — G0008 ADMIN INFLUENZA VIRUS VAC: HCPCS

## 2025-02-07 PROCEDURE — 90662 IIV NO PRSV INCREASED AG IM: CPT

## 2025-02-07 PROCEDURE — 3074F SYST BP LT 130 MM HG: CPT | Performed by: STUDENT IN AN ORGANIZED HEALTH CARE EDUCATION/TRAINING PROGRAM

## 2025-02-07 ASSESSMENT — FIBROSIS 4 INDEX: FIB4 SCORE: 1.71

## 2025-02-07 ASSESSMENT — PATIENT HEALTH QUESTIONNAIRE - PHQ9: CLINICAL INTERPRETATION OF PHQ2 SCORE: 0

## 2025-02-07 NOTE — PROGRESS NOTES
Subjective:     CC:   Chief Complaint   Patient presents with    Annual Exam       HPI:   Nila Villatoro is a 69 y.o. female who presents for annual exam. She is feeling well and denies any complaints. : Sachi ([id number]).    Ob-Gyn/ History:    Patient has GYN provider: Yes  /Para:    Last Pap Smear: . She has history of abnormal pap smears.  Gyn Surgery:  LEEP procedure completed in 22  Current Contraceptive Method:  None. Currently not sexually active.  Last menstrual period:  At the age of 43.   No significant bloating/fluid retention, pelvic pain, or dyspareunia. No vaginal discharge.   Post-menopausal bleeding: None  Urinary incontinence: Yes minimal  Folate intake: N/A    Health Maintenance  Advanced directive: Patient does not have advanced directive  Osteoporosis Screen/ DEXA: DEXA scan completed in 2024 with findings of osteopenia.  PT for falls prevention: N/A  Cholesterol Screening: Will repeat today  Diabetes Screening: Previously completed  Aspirin Use: N/A    Anticipatory Guidance  Diet: She reports that she has changed her diet, she has cut down on sugar. She has 2-3 meals a day. She states her diet is well balanced.  Exercise: She reports that she does not exercise. At times she will walk.   Substance Abuse: N/A  Safe in relationship.   Seat belts, bike helmet, gun safety discussed.  Sun protection used.  Dental Home.    Cancer screening  Colorectal Cancer Screening: Completed, repeat again in 26  Lung Cancer Screening: N/A  Cervical Cancer Screening: Completed in 2024  Breast Cancer Screening: Mammogram completed in 2024, no evidence of malignancy.  Order has been placed today.    Infectious disease screening/Immunizations  --STI Screening: Declined  --Practices safe sex.  --HIV Screening: Completed, 2015 with negative findings  --Hepatitis C Screening: Completed in 10/2019, negative findings.  --Immunizations:    Influenza:  Ordered today   HPV: N/A   Tetanus: Up-to-date, due in 1/26/2030   Shingles: Shingles vaccine recommended at pharmacy   Pneumococcal : Up-to-date   Hepatitis B: N/A  COVID-19: Recommended to have at pharmacy  Other immunizations: N/A    She  has a past medical history of Gastritis, High cholesterol, Hyperlipidemia, Hypertension, Pain, PONV (postoperative nausea and vomiting), RLS (restless legs syndrome) (12/29/2022), and Venous insufficiency (12/09/2016).  She  has a past surgical history that includes breast biopsy; us-needle core bx-breast panel (Left); cholecystectomy (01/01/2009); primary c section; lumbar laminectomy diskectomy (04/26/2022); foraminotomy (04/26/2022); and leep (N/A, 11/2022).    Family History   Problem Relation Age of Onset    Cancer Mother         Cervical Cancer    Cancer Father     Heart Attack Father        Social History     Socioeconomic History    Marital status:      Spouse name: Not on file    Number of children: Not on file    Years of education: Not on file    Highest education level: 12th grade   Occupational History    Not on file   Tobacco Use    Smoking status: Never    Smokeless tobacco: Never   Vaping Use    Vaping status: Never Used   Substance and Sexual Activity    Alcohol use: Not Currently    Drug use: No    Sexual activity: Not Currently     Partners: Male     Birth control/protection: Post-Menopausal   Other Topics Concern    Not on file   Social History Narrative    Not on file     Social Drivers of Health     Financial Resource Strain: Low Risk  (2/6/2025)    Overall Financial Resource Strain (CARDIA)     Difficulty of Paying Living Expenses: Not hard at all   Food Insecurity: Patient Declined (2/6/2025)    Hunger Vital Sign     Worried About Running Out of Food in the Last Year: Patient declined     Ran Out of Food in the Last Year: Patient declined   Transportation Needs: Unknown (2/6/2025)    PRAPARE - Transportation     Lack of Transportation (Medical):  Patient declined     Lack of Transportation (Non-Medical): No   Physical Activity: Unknown (2/6/2025)    Exercise Vital Sign     Days of Exercise per Week: Patient declined     Minutes of Exercise per Session: 60 min   Stress: No Stress Concern Present (2/6/2025)    [employer] - Occupational Stress Questionnaire     Feeling of Stress : Only a little   Social Connections: Moderately Isolated (2/6/2025)    Social Connection and Isolation Panel [NHANES]     Frequency of Communication with Friends and Family: More than three times a week     Frequency of Social Gatherings with Friends and Family: More than three times a week     Attends [employer] Services: Never     Active Member of Clubs or Organizations: No     Attends Club or Organization Meetings: Never     Marital Status:    Intimate Partner Violence: Not on file   Housing Stability: Unknown (2/6/2025)    Housing Stability Vital Sign     Unable to Pay for Housing in the Last Year: Patient declined     Number of Times Moved in the Last Year: Not on file     Homeless in the Last Year: No       Patient Active Problem List    Diagnosis Date Noted    Common bile duct dilation 07/08/2024    Lung nodule seen on imaging study 07/08/2024    Hashimoto's disease 07/08/2024    Sore throat 05/14/2024    JOEY I (cervical intraepithelial neoplasia I) 12/29/2022    Chronic left-sided low back pain with sciatica 07/23/2020    Herniation of left side of L4-L5 intervertebral disc 07/23/2020    Hypertension     Cervical high risk HPV (human papillomavirus) test positive 10/29/2019    Epigastric pain 10/01/2019    Pelvic pain in female 06/21/2018    Mammographic microcalcification on right breast and biopsy on 4/1/16 ruled out malignancy  06/02/2016    Dyslipidemia 02/10/2016         Current Outpatient Medications   Medication Sig Dispense Refill    levothyroxine (SYNTHROID) 25 MCG Tab TOME BRANDY TABLETA POR VIA ORAL TODOS LOS WHITFIELD EN LA MANANA ON AN  "EMPTY STOMACH 90 Tablet 1    metoprolol SR (TOPROL XL) 25 MG TABLET SR 24 HR TOME BRANDY TABLETA TODOS LOS WHITFIELD 90 Tablet 3    carbidopa-levodopa (SINEMET)  MG Tab Take 1 Tablet by mouth 3 times a day.      atorvastatin (LIPITOR) 20 MG Tab TOME 1 TABLETA POR VIA ORAL TODOS LOS WHITFIELD 90 Tablet 3     No current facility-administered medications for this visit.     Allergies   Allergen Reactions    Cephalexin Hives and Itching     HIVES 6/6/24    Penicillins Hives     4/2022: tolerated cefazolin       Review of Systems  Constitutional: Negative for fever, chills and malaise/fatigue.   HENT: Negative for congestion.    Eyes: Negative for pain.    Respiratory: Negative for cough and shortness of breath.  Cardiovascular: Negative for leg swelling.   Gastrointestinal: Negative for nausea, vomiting, and diarrhea. She reports at time having abdominal pain.  Genitourinary: Negative for dysuria and hematuria.   Skin: Negative for rash.   Neurological: Negative for dizziness, focal weakness. She at times will have headaches.   Endo/Heme/Allergies: Does not bleed easily.   Psychiatric/Behavioral: Negative for depression.The patient is not nervous/anxious.      Objective:     /82 (BP Location: Left arm, Patient Position: Sitting, BP Cuff Size: Adult)   Pulse 60   Temp 36.4 °C (97.6 °F) (Temporal)   Ht 1.6 m (5' 3\")   Wt 85.3 kg (188 lb)   LMP  (LMP Unknown)   SpO2 98%   BMI 33.30 kg/m²   Body mass index is 33.3 kg/m².  Wt Readings from Last 4 Encounters:   02/07/25 85.3 kg (188 lb)   01/09/25 86.6 kg (191 lb)   12/12/24 86.6 kg (191 lb)   11/27/24 86.6 kg (191 lb)       Physical Exam:  Constitutional: Well-developed and well-nourished. Not diaphoretic. No distress.   Skin: Skin is warm and dry. No rash noted.  Head: Atraumatic without lesions.  Eyes: Conjunctivae and extraocular motions are normal. Pupils are equal, round, and reactive to light. No scleral icterus.   Ears:  External ears unremarkable. Tympanic " membranes clear and intact.  Nose: Nares patent. Septum midline. Turbinates without erythema nor edema. No discharge.   Mouth/Throat: Dentition is good. Tongue normal. Oropharynx is clear and moist. Posterior pharynx without erythema or exudates.  Neck: Supple, trachea midline. Normal range of motion. No thyromegaly present. No lymphadenopathy--cervical or supraclavicular.  Cardiovascular: Regular rate and rhythm, S1 and S2 without murmur, rubs, or gallops.  Lungs: Normal inspiratory effort, CTA bilaterally, no wheezes/rhonchi/rales  Breast: Deferred   Abdomen: Soft, non tender, and without distention. Active bowel sounds in all four quadrants. No rebound, guarding, masses or HSM.  : Deferred  Extremities: No cyanosis, clubbing, erythema, nor edema. Distal pulses intact and symmetric.   Musculoskeletal: All major joints AROM full in all directions without pain.  Neurological: Alert and oriented x 3. DTRs 2+/3 and symmetric. No cranial nerve deficit. 5/5 myotomes. Sensation intact.   Psychiatric:  Behavior, mood, and affect are appropriate.        Assessment and Plan:     1. Wellness examination  Patient presents today for annual preventive wellness examination.  Lipid panel order provided today.  Patient is up-to-date on preventative health screenings.  Patient received order for mammogram.  Patient received influenza vaccination today.  - Lipid Profile; Future    2. Encounter for screening mammogram for malignant neoplasm of breast  - MA-SCREENING MAMMO BILAT W/TOMOSYNTHESIS W/CAD; Future    3. Need for vaccination  - Influenza Vaccine, High Dose (65+ Only)        HCM:  Completed   Labs per orders  Immunizations per orders  Patient counseled about skin care, diet, supplements, prenatal vitamins, safe sex and exercise.      Follow-up: Return in about 1 year (around 2/7/2026), or if symptoms worsen or fail to improve, for Annual.

## 2025-02-14 ENCOUNTER — HOSPITAL ENCOUNTER (OUTPATIENT)
Dept: LAB | Facility: MEDICAL CENTER | Age: 70
End: 2025-02-14
Payer: MEDICARE

## 2025-02-14 LAB
25(OH)D3 SERPL-MCNC: 16 NG/ML (ref 30–100)
ALBUMIN SERPL BCP-MCNC: 3.7 G/DL (ref 3.2–4.9)
ALBUMIN/GLOB SERPL: 1.1 G/DL
ALP SERPL-CCNC: 149 U/L (ref 30–99)
ALT SERPL-CCNC: 16 U/L (ref 2–50)
ANION GAP SERPL CALC-SCNC: 10 MMOL/L (ref 7–16)
AST SERPL-CCNC: 24 U/L (ref 12–45)
BASOPHILS # BLD AUTO: 0.9 % (ref 0–1.8)
BASOPHILS # BLD: 0.06 K/UL (ref 0–0.12)
BILIRUB SERPL-MCNC: 0.6 MG/DL (ref 0.1–1.5)
BUN SERPL-MCNC: 13 MG/DL (ref 8–22)
CALCIUM ALBUM COR SERPL-MCNC: 9.3 MG/DL (ref 8.5–10.5)
CALCIUM SERPL-MCNC: 9.1 MG/DL (ref 8.5–10.5)
CHLORIDE SERPL-SCNC: 106 MMOL/L (ref 96–112)
CHOLEST SERPL-MCNC: 158 MG/DL (ref 100–199)
CO2 SERPL-SCNC: 25 MMOL/L (ref 20–33)
CREAT SERPL-MCNC: 0.84 MG/DL (ref 0.5–1.4)
EOSINOPHIL # BLD AUTO: 0.12 K/UL (ref 0–0.51)
EOSINOPHIL NFR BLD: 1.8 % (ref 0–6.9)
ERYTHROCYTE [DISTWIDTH] IN BLOOD BY AUTOMATED COUNT: 47 FL (ref 35.9–50)
GFR SERPLBLD CREATININE-BSD FMLA CKD-EPI: 76 ML/MIN/1.73 M 2
GLOBULIN SER CALC-MCNC: 3.5 G/DL (ref 1.9–3.5)
GLUCOSE SERPL-MCNC: 96 MG/DL (ref 65–99)
HCT VFR BLD AUTO: 41.1 % (ref 37–47)
HDLC SERPL-MCNC: 49 MG/DL
HGB BLD-MCNC: 13.3 G/DL (ref 12–16)
IMM GRANULOCYTES # BLD AUTO: 0.02 K/UL (ref 0–0.11)
IMM GRANULOCYTES NFR BLD AUTO: 0.3 % (ref 0–0.9)
LDLC SERPL CALC-MCNC: 86 MG/DL
LYMPHOCYTES # BLD AUTO: 2.27 K/UL (ref 1–4.8)
LYMPHOCYTES NFR BLD: 33.7 % (ref 22–41)
MCH RBC QN AUTO: 29.3 PG (ref 27–33)
MCHC RBC AUTO-ENTMCNC: 32.4 G/DL (ref 32.2–35.5)
MCV RBC AUTO: 90.5 FL (ref 81.4–97.8)
MONOCYTES # BLD AUTO: 0.51 K/UL (ref 0–0.85)
MONOCYTES NFR BLD AUTO: 7.6 % (ref 0–13.4)
NEUTROPHILS # BLD AUTO: 3.76 K/UL (ref 1.82–7.42)
NEUTROPHILS NFR BLD: 55.7 % (ref 44–72)
NRBC # BLD AUTO: 0 K/UL
NRBC BLD-RTO: 0 /100 WBC (ref 0–0.2)
PLATELET # BLD AUTO: 263 K/UL (ref 164–446)
PMV BLD AUTO: 10.3 FL (ref 9–12.9)
POTASSIUM SERPL-SCNC: 4.1 MMOL/L (ref 3.6–5.5)
PROT SERPL-MCNC: 7.2 G/DL (ref 6–8.2)
RBC # BLD AUTO: 4.54 M/UL (ref 4.2–5.4)
SODIUM SERPL-SCNC: 141 MMOL/L (ref 135–145)
TRIGL SERPL-MCNC: 116 MG/DL (ref 0–149)
WBC # BLD AUTO: 6.7 K/UL (ref 4.8–10.8)

## 2025-02-14 PROCEDURE — 84446 ASSAY OF VITAMIN E: CPT

## 2025-02-14 PROCEDURE — 82306 VITAMIN D 25 HYDROXY: CPT

## 2025-02-14 PROCEDURE — 36415 COLL VENOUS BLD VENIPUNCTURE: CPT

## 2025-02-14 PROCEDURE — 84590 ASSAY OF VITAMIN A: CPT

## 2025-02-14 PROCEDURE — 85025 COMPLETE CBC W/AUTO DIFF WBC: CPT

## 2025-02-14 PROCEDURE — 80053 COMPREHEN METABOLIC PANEL: CPT

## 2025-02-14 PROCEDURE — 84597 ASSAY OF VITAMIN K: CPT

## 2025-02-14 PROCEDURE — 80061 LIPID PANEL: CPT

## 2025-02-18 LAB — PHYTONADIONE SERPL-MCNC: 1.28 NMOL/L (ref 0.22–4.88)

## 2025-02-19 LAB
A-TOCOPHEROL VIT E SERPL-MCNC: 13.5 MG/L (ref 5.5–18)
ANNOTATION COMMENT IMP: NORMAL
BETA+GAMMA TOCOPHEROL SERPL-MCNC: 1.4 MG/L (ref 0–6)
RETINYL PALMITATE SERPL-MCNC: <0.02 MG/L (ref 0–0.1)
VIT A SERPL-MCNC: 0.43 MG/L (ref 0.3–1.2)

## 2025-02-25 ENCOUNTER — TELEPHONE (OUTPATIENT)
Dept: PHYSICAL THERAPY | Facility: REHABILITATION | Age: 70
End: 2025-02-25
Payer: MEDICARE

## 2025-02-25 NOTE — OP THERAPY DISCHARGE SUMMARY
Outpatient Physical Therapy  DISCHARGE SUMMARY NOTE      [facility] [place] [address] [facility]  [address]  [place] [address]  Phone:  [phone]  Fax:  [phone]    Date of Visit: 02/25/2025    Patient: Nila Villatoro  YOB: 1955  MRN: [id number]     Referring Provider: Teresa Gonzales M.D.  [address] [place] [id number]   Referring Diagnosis Acute right-sided low back pain, unspecified whether sciatica present [M54.50]         Functional Assessment Used        Your patient is being discharged from Physical Therapy with the following comments:   Patient has failed to schedule or reschedule follow-up visits    Comments:  Pt attended 8 visits from 10/21/24 to 12/16/24. Pt has not been seen in >60 days, is appropriate for DC. Unable to assess goals.          Madai Delaney, PT, DPT    Date: 2/25/2025

## 2025-03-06 ENCOUNTER — OFFICE VISIT (OUTPATIENT)
Dept: SPORTS MEDICINE | Facility: OTHER | Age: 70
End: 2025-03-06
Payer: MEDICARE

## 2025-03-06 VITALS — HEIGHT: 63 IN | WEIGHT: 188 LBS | BODY MASS INDEX: 33.31 KG/M2

## 2025-03-06 DIAGNOSIS — M17.11 LOCALIZED OSTEOARTHRITIS OF RIGHT KNEE: ICD-10-CM

## 2025-03-06 DIAGNOSIS — M51.26 HERNIATION OF LEFT SIDE OF L4-L5 INTERVERTEBRAL DISC: ICD-10-CM

## 2025-03-06 DIAGNOSIS — Z98.890 HISTORY OF LUMBAR LAMINECTOMY: ICD-10-CM

## 2025-03-06 PROCEDURE — 20610 DRAIN/INJ JOINT/BURSA W/O US: CPT | Mod: RT | Performed by: FAMILY MEDICINE

## 2025-03-06 PROCEDURE — 99213 OFFICE O/P EST LOW 20 MIN: CPT | Mod: 25 | Performed by: FAMILY MEDICINE

## 2025-03-06 RX ORDER — TRIAMCINOLONE ACETONIDE 40 MG/ML
40 INJECTION, SUSPENSION INTRA-ARTICULAR; INTRAMUSCULAR ONCE
Status: COMPLETED | OUTPATIENT
Start: 2025-03-06 | End: 2025-03-06

## 2025-03-06 RX ADMIN — TRIAMCINOLONE ACETONIDE 40 MG: 40 INJECTION, SUSPENSION INTRA-ARTICULAR; INTRAMUSCULAR at 15:42

## 2025-03-06 ASSESSMENT — FIBROSIS 4 INDEX: FIB4 SCORE: 1.55

## 2025-03-06 NOTE — PROGRESS NOTES
"Chief Complaint   Patient presents with    Knee Pain     R knee pain      CHIEF COMPLAINT:  Nila Villatoro female presenting at the request of Self-referred for evaluation of knee pain.     Nila Villatoro is complaining of right knee pain  Worse for 1 week  Initial pain 6 yrs or so (roughly back in 2018)  Pain is at the lateral knee  Quality is sharp  Pain is non-radiating   Improved with resting and compression  Aggravated by walking  no prior problems with this area in the past   Prior Treatments: none   Prior studies: NO Prior imaging has been done   Medications tried for pain include: none  Mechanical Symptom history: No Locking and Grinding    Patient does have POSITIVE history of lumbar spine pain with history of lumbar laminectomy and discectomy  Fortunately, she has not experienced any significant weakness in the leg and she denies any radicular symptoms    UPDATE:  Lumbar pain is still present  Fortunately, her RIGHT knee pain has IMPROVED  She is still experiencing some medial knee pain  She is having difficulty doing extended walking/standing  She has been using functional knee brace which has helped  Knee swelling has come down    [employer] (back room clothing re-racking)     PHYSICAL EXAM:  Ht 1.6 m (5' 3\")   Wt 85.3 kg (188 lb)   LMP  (LMP Unknown)   BMI 33.30 kg/m²      well-developed, well-nourished in no apparent distress, alert and oriented x 3.  Gait: antalgic    RIGHT Knee:  Slight Varus and No Swelling  Range of Motion Slightly limited with Flexion  1+ effusion  Patellar No tenderness and no apprehension  Medial Joint Line Non-tender and NEGATIVE Valencia  Lateral Joint Line Tenderness and NEGATIVE Valencia  Trace Laxity with Varus stress  Trace Laxity with Valgus stress  Lachman's testing is Trace  Posterior Drawer Testing is Trace  The leg is otherwise neurovascularly intact    LEFT Knee:  Slight Varus and No Swelling   Range of Motion Intact  Trace effusion  Patellar " No tenderness and no apprehension  Medial Joint Line Non-tender and NEGATIVE Valencia  Lateral Joint Line Non-tender and NEGATIVE Valencia  Trace Laxity with Varus stress  Trace Laxity with Valgus stress  Lachman's testing is Trace  Posterior Drawer Testing is Trace  The leg is otherwise neurovascularly intact    Additional Findings: None    1. Localized osteoarthritis of right knee  triamcinolone acetonide (Kenalog-40) injection 40 mg      2. Herniation of left side of L4-L5 intervertebral disc        3. History of lumbar laminectomy          right knee pain  Worse for 1 week  Initial pain 6 yrs or so (roughly back in 2018)    Her pain seems to be localized at the knee  Mostly at the lateral side  Her hip exam is relatively benign  She does have a history of lumbar laminectomy, she does have some lower lumbar achiness, but she has NEGATIVE slump test and no notable weakness in the legs    RIGHT knee intra-articular corticosteroid injection performed (December 12, 2024)   Repeat RIGHT knee intra-articular cortisone injection performed the office TODAY (March 6, 2025)    She was provided with knee exercises to work on  Hopefully, with additional exercises and strengthening together with bracing for extended activity she will continue to improve    PROCEDURE NOTE:  right Knee corticosteroid injection  Risks and benefits discussed  Informed consent obtained  Knee prepped in sterile fashion utilizing a jorje- inferior approach  40 mg of Kenalog and 5 cc of bupivacaine injected into the knee joint space  Vapocoolant spray was utilized  Patient tolerated the procedure well  Postprocedure care and red flags discussed              12/12/2024 10:28 AM     HISTORY/REASON FOR EXAM:  Atraumatic Pain/Swelling/Deformity; chronic R knee pain.     TECHNIQUE/EXAM DESCRIPTION AND NUMBER OF VIEWS:  4 views of the RIGHT knee.     COMPARISON: 4/4/2021     FINDINGS:  No focal soft tissue swelling or gross joint effusion.  Minimal loss  of joint space primarily in the medial compartment.  Small osteophytes at the articular margins.  No fracture or dislocation.     IMPRESSION:     1.  No fracture or dislocation of RIGHT knee  2.  Mild degenerative changes.        Exam Ended: 12/12/24 10:28 AM Last Resulted: 12/12/24 10:41 AM         Self-referred

## 2025-03-18 ENCOUNTER — HOSPITAL ENCOUNTER (EMERGENCY)
Facility: MEDICAL CENTER | Age: 70
End: 2025-03-19
Attending: EMERGENCY MEDICINE
Payer: MEDICARE

## 2025-03-18 DIAGNOSIS — R10.13 EPIGASTRIC PAIN: ICD-10-CM

## 2025-03-18 DIAGNOSIS — R11.2 NAUSEA AND VOMITING, UNSPECIFIED VOMITING TYPE: ICD-10-CM

## 2025-03-18 LAB
EKG IMPRESSION: NORMAL
GLUCOSE BLD STRIP.AUTO-MCNC: 120 MG/DL (ref 65–99)

## 2025-03-18 PROCEDURE — 36415 COLL VENOUS BLD VENIPUNCTURE: CPT

## 2025-03-18 PROCEDURE — 99285 EMERGENCY DEPT VISIT HI MDM: CPT

## 2025-03-18 PROCEDURE — 83690 ASSAY OF LIPASE: CPT

## 2025-03-18 PROCEDURE — 84484 ASSAY OF TROPONIN QUANT: CPT

## 2025-03-18 PROCEDURE — 93005 ELECTROCARDIOGRAM TRACING: CPT | Mod: TC

## 2025-03-18 PROCEDURE — 85025 COMPLETE CBC W/AUTO DIFF WBC: CPT

## 2025-03-18 PROCEDURE — 82962 GLUCOSE BLOOD TEST: CPT

## 2025-03-18 PROCEDURE — 80053 COMPREHEN METABOLIC PANEL: CPT

## 2025-03-18 PROCEDURE — 93005 ELECTROCARDIOGRAM TRACING: CPT | Mod: TC | Performed by: EMERGENCY MEDICINE

## 2025-03-18 ASSESSMENT — FIBROSIS 4 INDEX: FIB4 SCORE: 1.55

## 2025-03-18 ASSESSMENT — PAIN DESCRIPTION - PAIN TYPE: TYPE: ACUTE PAIN

## 2025-03-19 ENCOUNTER — APPOINTMENT (OUTPATIENT)
Dept: RADIOLOGY | Facility: MEDICAL CENTER | Age: 70
End: 2025-03-19
Attending: EMERGENCY MEDICINE
Payer: MEDICARE

## 2025-03-19 VITALS
WEIGHT: 192.68 LBS | TEMPERATURE: 97.4 F | HEIGHT: 63 IN | SYSTOLIC BLOOD PRESSURE: 141 MMHG | HEART RATE: 62 BPM | RESPIRATION RATE: 18 BRPM | BODY MASS INDEX: 34.14 KG/M2 | OXYGEN SATURATION: 91 % | DIASTOLIC BLOOD PRESSURE: 67 MMHG

## 2025-03-19 LAB
ALBUMIN SERPL BCP-MCNC: 3.9 G/DL (ref 3.2–4.9)
ALBUMIN/GLOB SERPL: 1.1 G/DL
ALP SERPL-CCNC: 166 U/L (ref 30–99)
ALT SERPL-CCNC: <5 U/L (ref 2–50)
ANION GAP SERPL CALC-SCNC: 14 MMOL/L (ref 7–16)
APPEARANCE UR: CLEAR
AST SERPL-CCNC: 21 U/L (ref 12–45)
BACTERIA #/AREA URNS HPF: NORMAL /HPF
BASOPHILS # BLD AUTO: 0.4 % (ref 0–1.8)
BASOPHILS # BLD: 0.05 K/UL (ref 0–0.12)
BILIRUB SERPL-MCNC: 1.1 MG/DL (ref 0.1–1.5)
BILIRUB UR QL STRIP.AUTO: NEGATIVE
BUN SERPL-MCNC: 13 MG/DL (ref 8–22)
CALCIUM ALBUM COR SERPL-MCNC: 9 MG/DL (ref 8.5–10.5)
CALCIUM SERPL-MCNC: 8.9 MG/DL (ref 8.5–10.5)
CASTS URNS QL MICRO: NORMAL /LPF (ref 0–2)
CHLORIDE SERPL-SCNC: 95 MMOL/L (ref 96–112)
CO2 SERPL-SCNC: 23 MMOL/L (ref 20–33)
COLOR UR: YELLOW
CREAT SERPL-MCNC: 0.77 MG/DL (ref 0.5–1.4)
EOSINOPHIL # BLD AUTO: 0.01 K/UL (ref 0–0.51)
EOSINOPHIL NFR BLD: 0.1 % (ref 0–6.9)
EPITHELIAL CELLS 1715: NORMAL /HPF (ref 0–5)
ERYTHROCYTE [DISTWIDTH] IN BLOOD BY AUTOMATED COUNT: 43.5 FL (ref 35.9–50)
GFR SERPLBLD CREATININE-BSD FMLA CKD-EPI: 84 ML/MIN/1.73 M 2
GLOBULIN SER CALC-MCNC: 3.7 G/DL (ref 1.9–3.5)
GLUCOSE SERPL-MCNC: 132 MG/DL (ref 65–99)
GLUCOSE UR STRIP.AUTO-MCNC: NEGATIVE MG/DL
HCT VFR BLD AUTO: 39.8 % (ref 37–47)
HGB BLD-MCNC: 13 G/DL (ref 12–16)
IMM GRANULOCYTES # BLD AUTO: 0.06 K/UL (ref 0–0.11)
IMM GRANULOCYTES NFR BLD AUTO: 0.5 % (ref 0–0.9)
KETONES UR STRIP.AUTO-MCNC: NEGATIVE MG/DL
LEUKOCYTE ESTERASE UR QL STRIP.AUTO: ABNORMAL
LIPASE SERPL-CCNC: 28 U/L (ref 11–82)
LYMPHOCYTES # BLD AUTO: 2.42 K/UL (ref 1–4.8)
LYMPHOCYTES NFR BLD: 20 % (ref 22–41)
MCH RBC QN AUTO: 29.3 PG (ref 27–33)
MCHC RBC AUTO-ENTMCNC: 32.7 G/DL (ref 32.2–35.5)
MCV RBC AUTO: 89.6 FL (ref 81.4–97.8)
MICRO URNS: ABNORMAL
MONOCYTES # BLD AUTO: 0.65 K/UL (ref 0–0.85)
MONOCYTES NFR BLD AUTO: 5.4 % (ref 0–13.4)
NEUTROPHILS # BLD AUTO: 8.92 K/UL (ref 1.82–7.42)
NEUTROPHILS NFR BLD: 73.6 % (ref 44–72)
NITRITE UR QL STRIP.AUTO: NEGATIVE
NRBC # BLD AUTO: 0 K/UL
NRBC BLD-RTO: 0 /100 WBC (ref 0–0.2)
PH UR STRIP.AUTO: 7 [PH] (ref 5–8)
PLATELET # BLD AUTO: 266 K/UL (ref 164–446)
PMV BLD AUTO: 9.2 FL (ref 9–12.9)
POTASSIUM SERPL-SCNC: 3.5 MMOL/L (ref 3.6–5.5)
PROT SERPL-MCNC: 7.6 G/DL (ref 6–8.2)
PROT UR QL STRIP: NEGATIVE MG/DL
RBC # BLD AUTO: 4.44 M/UL (ref 4.2–5.4)
RBC # URNS HPF: NORMAL /HPF (ref 0–2)
RBC UR QL AUTO: NEGATIVE
SODIUM SERPL-SCNC: 132 MMOL/L (ref 135–145)
SP GR UR STRIP.AUTO: 1
TROPONIN T SERPL-MCNC: <6 NG/L (ref 6–19)
UROBILINOGEN UR STRIP.AUTO-MCNC: 0.2 EU/DL
WBC # BLD AUTO: 12.1 K/UL (ref 4.8–10.8)
WBC #/AREA URNS HPF: NORMAL /HPF

## 2025-03-19 PROCEDURE — 96374 THER/PROPH/DIAG INJ IV PUSH: CPT

## 2025-03-19 PROCEDURE — 700105 HCHG RX REV CODE 258: Performed by: EMERGENCY MEDICINE

## 2025-03-19 PROCEDURE — 70450 CT HEAD/BRAIN W/O DYE: CPT

## 2025-03-19 PROCEDURE — 81001 URINALYSIS AUTO W/SCOPE: CPT

## 2025-03-19 PROCEDURE — 700111 HCHG RX REV CODE 636 W/ 250 OVERRIDE (IP): Mod: JZ | Performed by: EMERGENCY MEDICINE

## 2025-03-19 PROCEDURE — 700102 HCHG RX REV CODE 250 W/ 637 OVERRIDE(OP): Performed by: EMERGENCY MEDICINE

## 2025-03-19 PROCEDURE — A9270 NON-COVERED ITEM OR SERVICE: HCPCS | Performed by: EMERGENCY MEDICINE

## 2025-03-19 RX ORDER — SODIUM CHLORIDE 9 MG/ML
1000 INJECTION, SOLUTION INTRAVENOUS ONCE
Status: COMPLETED | OUTPATIENT
Start: 2025-03-19 | End: 2025-03-19

## 2025-03-19 RX ORDER — ONDANSETRON 2 MG/ML
4 INJECTION INTRAMUSCULAR; INTRAVENOUS ONCE
Status: COMPLETED | OUTPATIENT
Start: 2025-03-19 | End: 2025-03-19

## 2025-03-19 RX ORDER — ONDANSETRON 4 MG/1
4 TABLET, ORALLY DISINTEGRATING ORAL EVERY 6 HOURS PRN
Qty: 16 TABLET | Refills: 0 | Status: SHIPPED | OUTPATIENT
Start: 2025-03-19 | End: 2025-03-23

## 2025-03-19 RX ADMIN — LIDOCAINE HYDROCHLORIDE 30 ML: 20 SOLUTION OROPHARYNGEAL at 02:00

## 2025-03-19 RX ADMIN — SODIUM CHLORIDE 1000 ML: 9 INJECTION, SOLUTION INTRAVENOUS at 00:13

## 2025-03-19 RX ADMIN — ONDANSETRON 4 MG: 2 INJECTION INTRAMUSCULAR; INTRAVENOUS at 00:14

## 2025-03-19 ASSESSMENT — HEART SCORE
AGE: 65+
ECG: NORMAL
HISTORY: SLIGHTLY SUSPICIOUS
RISK FACTORS: 1-2 RISK FACTORS
TROPONIN: LESS THAN OR EQUAL TO NORMAL LIMIT
HEART SCORE: 3

## 2025-03-19 ASSESSMENT — PAIN DESCRIPTION - PAIN TYPE: TYPE: ACUTE PAIN

## 2025-03-19 NOTE — ED PROVIDER NOTES
ER Provider Note    Scribed for Jose Sanchez II, M.D. by Willis Arreguin. 3/18/2025  11:52 PM    Primary Care Provider: Teresa Gonzales M.D.    CHIEF COMPLAINT   Chief Complaint   Patient presents with    Dizziness     Pt reports dizziness x 3 hours. Pt reports numbness to right side of face and weakness on left side of body.    N/V    Abdominal Pain     EXTERNAL RECORDS REVIEWED  Outpatient Notes Reviewed family medicine note from 9/4/24 which mentions the patient went to a neurologist where she was prescribed  carbidopa-levodopa  mg daily. It is unclear if she was formally diagnosed with Parkinson's.    The patient was seen by her primary care provider on 11/27/24 that mentions the patient was prescribed medication by neurology, but she discontinued this due to side effects. She was encouraged to go back to the neurologist.     HPI/ROS  LIMITATION TO HISTORY   Select: Language Kinyarwanda,  Used Daughter, later [id number]  OUTSIDE HISTORIAN(S):  Family Daughter provides all history and interprets    Nila Villatoro is a 69 y.o. female who presents to the ED for evaluation of nausea and vomiting onset 3 PM. The patient's daughter reports the patient was recently diagnosed with Parkinson's and had a liver scan (MRI for diagnosis of hepatitis A earlier this year) this morning that required fasting. The daughter reports right sided numbness, vomiting since 3 PM. The patient reports burning abdominal pain began around 10 AM after taking her morning medications on an empty stomach. The daughter also reports the patient complained of dizziness on arrival to the ED. The daughter reports the patient decided to come to the ED for persistent nausea. The patient was able to take her morning medications including those for hypertension. The patient has had a tremor since last November. The patient has a history of cholecystectomy.     PAST MEDICAL HISTORY  Past Medical History:   Diagnosis Date     "Gastritis     High cholesterol     Hyperlipidemia     Hypertension     Pain     BACK PAIN    PONV (postoperative nausea and vomiting)     RLS (restless legs syndrome) 12/29/2022    Chronic, stable  She has established with neurologist, she was told that she does not have restless leg syndrome and that it may be due sciatica.    Venous insufficiency 12/09/2016   Reports history of gastritis.     SURGICAL HISTORY  Past Surgical History:   Procedure Laterality Date    LEEP N/A 11/2022    LUMBAR LAMINECTOMY DISKECTOMY  04/26/2022    Procedure: LAMINECTOMY, SPINE, LUMBAR, WITHL DISCECTOMY - L5 WITH L4-S1 MEDIAL FACETECTOMY;  Surgeon: Duane Weiss M.D.;  Location: SURGERY [facility];  Service: Neurosurgery    FORAMINOTOMY  04/26/2022    Procedure: FORAMINOTOMY, SPINE;  Surgeon: Duane Weiss M.D.;  Location: SURGERY [facility];  Service: Neurosurgery    CHOLECYSTECTOMY  01/01/2009    BREAST BIOPSY      PRIMARY C SECTION      3 times, last one in 1986    US-NEEDLE CORE BX-BREAST PANEL Left      FAMILY HISTORY  Family History   Problem Relation Age of Onset    Cancer Mother         Cervical Cancer    Cancer Father     Heart Attack Father      SOCIAL HISTORY   reports that she has never smoked. She has never used smokeless tobacco. She reports that she does not currently use alcohol. She reports that she does not use drugs.    CURRENT MEDICATIONS  Previous Medications    ATORVASTATIN (LIPITOR) 20 MG TAB    TOME 1 TABLETA POR VIA ORAL TODOS LOS WHITFIELD    CARBIDOPA-LEVODOPA (SINEMET)  MG TAB    Take 1 Tablet by mouth 3 times a day.    LEVOTHYROXINE (SYNTHROID) 25 MCG TAB    TOME BRANDY TABLETA POR VIA ORAL TODOS LOS WHITFIELD EN LA MANANA ON AN EMPTY STOMACH    METOPROLOL SR (TOPROL XL) 25 MG TABLET SR 24 HR    TOME BRANDY TABLETA TODOS LOS WHITFIELD     ALLERGIES  Cephalexin and Penicillins    PHYSICAL EXAM  BP (!) 175/90   Pulse 75   Temp 36.4 °C (97.6 °F) (Temporal)   Resp 18   Ht 1.6 m (5' 3\")   Wt 87.4 kg (192 lb 10.9 " oz)   LMP  (LMP Unknown)   SpO2 97%   BMI 34.13 kg/m²   Physical Exam  Vitals and nursing note reviewed.   Constitutional:       General: She is not in acute distress.  HENT:      Head: Normocephalic and atraumatic.   Eyes:      Extraocular Movements: Extraocular movements intact.      Pupils: Pupils are equal, round, and reactive to light.      Comments: No nystagmus   Cardiovascular:      Rate and Rhythm: Normal rate and regular rhythm.   Pulmonary:      Effort: Pulmonary effort is normal.      Breath sounds: Normal breath sounds.   Abdominal:      General: There is no distension.      Tenderness: There is no abdominal tenderness. There is no guarding.   Musculoskeletal:         General: No swelling or deformity. Normal range of motion.   Skin:     General: Skin is warm and dry.   Neurological:      General: No focal deficit present.      Mental Status: She is alert and oriented to person, place, and time.      Motor: No weakness.      Gait: Gait normal.   Psychiatric:         Mood and Affect: Mood normal.          DIAGNOSTIC STUDIES    EKG/LABS  Results for orders placed or performed during the hospital encounter of 25   POCT glucose device results    Collection Time: 25 11:37 PM   Result Value Ref Range    POC Glucose, Blood 120 (H) 65 - 99 mg/dL   EKG    Collection Time: 25 11:54 PM   Result Value Ref Range    Report       [facility] Emergency [facility].    Test Date:  2025  Pt Name:    AMOS MAK         Department: ER  MRN:        [id number]                      Room:  Gender:     Female                       Technician: [id number]  :        [id number]                   Requested By:ER TRIAGE PROTOCOL  Order #:    [id number]                    Reading MD: Jose Sanchez II, MD    Measurements  Intervals                                Axis  Rate:       66                           P:          0  LA:         0                            QRS:        -26  QRSD:        142                          T:          54  QT:         434  QTc:        455    Interpretive Statements  Atrial flutter/fibrillation  Right bundle branch block  Probable inferior infarct, acute  No ST elevation or depression.  Impression: Irregular rhythm artifact vs atrial flutter  Compared to ECG 06/29/2024 19:55:53    Electronically Signed On 03- 23:54:30 PDT by Jose Sanchez II, MD     CBC with Differential    Collection Time: 03/18/25 11:58 PM   Result Value Ref Range    WBC 12.1 (H) 4.8 - 10.8 K/uL    RBC 4.44 4.20 - 5.40 M/uL    Hemoglobin 13.0 12.0 - 16.0 g/dL    Hematocrit 39.8 37.0 - 47.0 %    MCV 89.6 81.4 - 97.8 fL    MCH 29.3 27.0 - 33.0 pg    MCHC 32.7 32.2 - 35.5 g/dL    RDW 43.5 35.9 - 50.0 fL    Platelet Count 266 164 - 446 K/uL    MPV 9.2 9.0 - 12.9 fL    Neutrophils-Polys 73.60 (H) 44.00 - 72.00 %    Lymphocytes 20.00 (L) 22.00 - 41.00 %    Monocytes 5.40 0.00 - 13.40 %    Eosinophils 0.10 0.00 - 6.90 %    Basophils 0.40 0.00 - 1.80 %    Immature Granulocytes 0.50 0.00 - 0.90 %    Nucleated RBC 0.00 0.00 - 0.20 /100 WBC    Neutrophils (Absolute) 8.92 (H) 1.82 - 7.42 K/uL    Lymphs (Absolute) 2.42 1.00 - 4.80 K/uL    Monos (Absolute) 0.65 0.00 - 0.85 K/uL    Eos (Absolute) 0.01 0.00 - 0.51 K/uL    Baso (Absolute) 0.05 0.00 - 0.12 K/uL    Immature Granulocytes (abs) 0.06 0.00 - 0.11 K/uL    NRBC (Absolute) 0.00 K/uL   Complete Metabolic Panel    Collection Time: 03/18/25 11:58 PM   Result Value Ref Range    Sodium 132 (L) 135 - 145 mmol/L    Potassium 3.5 (L) 3.6 - 5.5 mmol/L    Chloride 95 (L) 96 - 112 mmol/L    Co2 23 20 - 33 mmol/L    Anion Gap 14.0 7.0 - 16.0    Glucose 132 (H) 65 - 99 mg/dL    Bun 13 8 - 22 mg/dL    Creatinine 0.77 0.50 - 1.40 mg/dL    Calcium 8.9 8.5 - 10.5 mg/dL    Correct Calcium 9.0 8.5 - 10.5 mg/dL    AST(SGOT) 21 12 - 45 U/L    ALT(SGPT) <5 2 - 50 U/L    Alkaline Phosphatase 166 (H) 30 - 99 U/L    Total Bilirubin 1.1 0.1 - 1.5 mg/dL    Albumin 3.9 3.2 - 4.9  g/dL    Total Protein 7.6 6.0 - 8.2 g/dL    Globulin 3.7 (H) 1.9 - 3.5 g/dL    A-G Ratio 1.1 g/dL   Lipase    Collection Time: 03/18/25 11:58 PM   Result Value Ref Range    Lipase 28 11 - 82 U/L   ESTIMATED GFR    Collection Time: 03/18/25 11:58 PM   Result Value Ref Range    GFR (CKD-EPI) 84 >60 mL/min/1.73 m 2   TROPONIN    Collection Time: 03/18/25 11:58 PM   Result Value Ref Range    Troponin T <6 6 - 19 ng/L   Urinalysis    Collection Time: 03/19/25  1:41 AM    Specimen: Urine   Result Value Ref Range    Color Yellow     Character Clear     Specific Gravity 1.004 <1.035    Ph 7.0 5.0 - 8.0    Glucose Negative Negative mg/dL    Ketones Negative Negative mg/dL    Protein Negative Negative mg/dL    Bilirubin Negative Negative    Urobilinogen, Urine 0.2 <=1.0 EU/dL    Nitrite Negative Negative    Leukocyte Esterase Trace (A) Negative    Occult Blood Negative Negative    Micro Urine Req Microscopic    URINE MICROSCOPIC (W/UA)    Collection Time: 03/19/25  1:41 AM   Result Value Ref Range    WBC 3-5 /hpf    RBC 0-2 0 - 2 /hpf    Bacteria None Seen None /hpf    Epithelial Cells 0-2 0 - 5 /hpf    Urine Casts 0-2 0 - 2 /lpf      I have independently interpreted this EKG     RADIOLOGY/PROCEDURES   The attending emergency physician has independently interpreted the diagnostic imaging associated with this visit and am waiting the final reading from the radiologist.   My preliminary interpretation is a follows: CT-head: No hemorrhage, no masses     Radiologist interpretation:  CT-HEAD W/O   Final Result         1.  No acute intracranial abnormality.   2.  Atherosclerosis.                 COURSE & MEDICAL DECISION MAKING     ASSESSMENT, COURSE AND PLAN  Care Narrative:     11:52 PM - This is an emergency department evaluation of a 69 y.o. female with parkinson's disease, HLD, HTN who presents with her daughter for concerns of nausea and vomiting. Symptoms started early this afternoon. After having multiple episodes of  vomiting she now is here in the ER. Also feeling dizzy now. Epigastric pain. No chest pain. Generalized weakness. Tremor at extremities (most prominent at right arm) improves with movement. No unilateral weakness, facial droop. Plan for labs cbc, cmp, lipase, UA looking for electrolyte abnormalities, possible infection, pancreatitis, dehydration/TALIA. EKG done and is irregular looks like flutter but I think this is more likely secondary to her tremor. Monitor in room looks like sinus rhythm. Will treat with IV fluids and Zofran.     12:42 AM - Added troponin. Reviewed chart and she does have risk factors for MI.     1:43 AM - The patient was reevaluated at bedside. The patient informed me her neurologist is Dr. Herrera. Will also obtain CT head. Labs show slight increased in WBC of 12. Electrolytes within acceptable limits, mild hyponatremia. Lipase norm. Alk phos chronically elevated.  No bacteria in urine. I reviewed prior records and difficult to tell how she was diagnosed with Parkinson's. She says that her Neurologist gave her the medication for her tremor, the same that is for parkinson's. She does not recall any brain imaging done. Because of right arm tremor, worsening symptoms today with dizziness we will have imaging done to look for any signs of mass or ischemia.     3:44 AM - Reviewed CT head at this time as noted above, awaiting formal read by radiology.     4:10 AM - The patient was reevaluated at bedside with iPad . The patient reports feeling improved after medication. Informed the patient and her daughter of CT results negative for mass or other acute intracranial abnormality. The patient states she was prescribed Ursodiol for hepatitis A eight days ago and thinks this may be causing returning gastritis. Discussed  prescribing Zofran for nausea. Recommended bland diet.  She has been able to tolerate water here without recurrence of symptoms.   HYDRATION: Based on the patient's presentation of  Acute Vomiting the patient was given IV fluids. IV Hydration was used because oral hydration was not adequate alone. Upon recheck following hydration, the patient's vitals were stable.       HEART Score for Major Cardiac Events  HEART Score     History: Slightly suspicious  ECG: Normal  Age: 65+  Risk Factors: 1-2 risk factors  Troponin: Less than or equal to normal limit    Heart Score: 3    Total Score   0-3 Points = Low Score, risk of MACE 0.9-1.7%.  4-6 Points = Moderate Score, risk of MACE 12-16.6%  7-10 Points = High Score, risk of MACE 50-65%      PROBLEM LIST  #Vomiting, epigastric pain   -most likely gastritis, has had similar episodes before   -declined ant-acids due to prior intolerance   -prescribed zofran, recommended bland diet    #Tremor   -she reports diagnosis of Parkinson's. Tremor does go away with movement    -CT head done since patient said they had never had imaging and right hand with strong tremor compared to other extremities.         DISPOSITION AND DISCUSSIONS  I have discussed management of the patient with the following physicians and IVANIA's:  None    Discussion of management with other [facility] or appropriate source(s): None     Escalation of care considered, and ultimately not performed: acute inpatient care management, however at this time, the patient is most appropriate for outpatient management.    Barriers to care at this time, including but not limited to:  None known .     Decision tools and prescription drugs considered including, but not limited to: Medication modification zofran. Discussed medications she has intolerance to which is many. She declined starting antacids and will focus on bland diet instead.  .    The patient will return for new or worsening symptoms and is stable at the time of discharge.    The patient is referred to a primary physician for blood pressure management, diabetic screening, and for all other preventative health concerns.    DISPOSITION:  Patient will  be discharged home in stable condition.    FOLLOW UP:  Teresa Gonzales M.D.  [address] [id number] [phone]    Schedule an appointment as soon as possible for a visit   As needed for minor concerns    [facility], Emergency Dept  [address] [id number] [phone]    unable to stop vomiting, blood in vomit or stool, trouble breathing or other serious concerns come back      OUTPATIENT MEDICATIONS:  Discharge Medication List as of 3/19/2025  4:27 AM        START taking these medications    Details   ondansetron (ZOFRAN ODT) 4 MG TABLET DISPERSIBLE Take 1 Tablet by mouth every 6 hours as needed for Nausea/Vomiting for up to 4 days., Disp-16 Tablet, R-0, Normal              FINAL DIAGNOSIS  1. Nausea and vomiting, unspecified vomiting type    2. Epigastric pain         IWillis (Scribe), am scribing for, and in the presence of, OSITO Marquez II.    Electronically signed by: Willis Arreguin (Scribe), 3/18/2025    Jose TAMEZ II, M* personally performed the services described in this documentation, as scribed by Willis Arreguin in my presence, and it is both accurate and complete.     The note accurately reflects work and decisions made by me.  Jose Sanchez II, M.D.  3/19/2025  6:05 AM

## 2025-03-19 NOTE — ED NOTES
Discharged in stable condition, alert and oriented, ambulatory,accompanied by family.  Prescribed medication and follow up appointment instructed. Health education imparted. Instructed to come back once symptoms worsened. Pt verbalized understanding of the information given. Removed IV line and applied pressure.

## 2025-03-19 NOTE — ED TRIAGE NOTES
"Chief Complaint   Patient presents with    Dizziness     Pt reports dizziness x 3 hours. Pt reports numbness to right side of face and weakness on left side of body.    N/V    Abdominal Pain       68 yo F to triage for above complaint. Pt reports she last felt normal this AM. Pt has no unilateral deficits, no drift and no speech disturbances.     FSBG 120 in triage.      Pt placed in lobby. Pt educated on triage process. Pt encouraged to alert staff for any changes.     Patient and staff wearing appropriate PPE    BP (!) 175/90   Pulse 75   Temp 36.4 °C (97.6 °F) (Temporal)   Resp 18   Ht 1.6 m (5' 3\")   Wt 87.4 kg (192 lb 10.9 oz)   LMP  (LMP Unknown)   SpO2 97%   BMI 34.13 kg/m²     "

## 2025-03-27 ENCOUNTER — HOSPITAL ENCOUNTER (OUTPATIENT)
Dept: RADIOLOGY | Facility: MEDICAL CENTER | Age: 70
End: 2025-03-27
Attending: STUDENT IN AN ORGANIZED HEALTH CARE EDUCATION/TRAINING PROGRAM
Payer: MEDICARE

## 2025-03-27 DIAGNOSIS — Z12.31 ENCOUNTER FOR SCREENING MAMMOGRAM FOR MALIGNANT NEOPLASM OF BREAST: ICD-10-CM

## 2025-03-27 PROCEDURE — 77067 SCR MAMMO BI INCL CAD: CPT

## 2025-03-31 ENCOUNTER — RESULTS FOLLOW-UP (OUTPATIENT)
Dept: MEDICAL GROUP | Facility: PHYSICIAN GROUP | Age: 70
End: 2025-03-31

## 2025-03-31 DIAGNOSIS — R92.8 ABNORMAL MAMMOGRAM: ICD-10-CM

## 2025-04-03 ENCOUNTER — RESULTS FOLLOW-UP (OUTPATIENT)
Dept: MEDICAL GROUP | Facility: PHYSICIAN GROUP | Age: 70
End: 2025-04-03

## 2025-04-03 ENCOUNTER — HOSPITAL ENCOUNTER (OUTPATIENT)
Dept: RADIOLOGY | Facility: MEDICAL CENTER | Age: 70
End: 2025-04-03
Attending: STUDENT IN AN ORGANIZED HEALTH CARE EDUCATION/TRAINING PROGRAM
Payer: MEDICARE

## 2025-04-03 DIAGNOSIS — R92.8 ABNORMAL MAMMOGRAM: ICD-10-CM

## 2025-04-03 PROCEDURE — 76642 ULTRASOUND BREAST LIMITED: CPT | Mod: LT

## 2025-06-09 ENCOUNTER — HOSPITAL ENCOUNTER (OUTPATIENT)
Dept: LAB | Facility: MEDICAL CENTER | Age: 70
End: 2025-06-09
Attending: INTERNAL MEDICINE
Payer: MEDICARE

## 2025-06-09 LAB
ALBUMIN SERPL BCP-MCNC: 4 G/DL (ref 3.2–4.9)
ALBUMIN/GLOB SERPL: 1.1 G/DL
ALP SERPL-CCNC: 159 U/L (ref 30–99)
ALT SERPL-CCNC: <5 U/L (ref 2–50)
ANION GAP SERPL CALC-SCNC: 11 MMOL/L (ref 7–16)
AST SERPL-CCNC: 20 U/L (ref 12–45)
BILIRUB SERPL-MCNC: 0.8 MG/DL (ref 0.1–1.5)
BUN SERPL-MCNC: 14 MG/DL (ref 8–22)
CALCIUM ALBUM COR SERPL-MCNC: 9 MG/DL (ref 8.5–10.5)
CALCIUM SERPL-MCNC: 9 MG/DL (ref 8.5–10.5)
CHLORIDE SERPL-SCNC: 106 MMOL/L (ref 96–112)
CO2 SERPL-SCNC: 23 MMOL/L (ref 20–33)
CREAT SERPL-MCNC: 0.8 MG/DL (ref 0.5–1.4)
GFR SERPLBLD CREATININE-BSD FMLA CKD-EPI: 80 ML/MIN/1.73 M 2
GLOBULIN SER CALC-MCNC: 3.6 G/DL (ref 1.9–3.5)
GLUCOSE SERPL-MCNC: 94 MG/DL (ref 65–99)
POTASSIUM SERPL-SCNC: 4.2 MMOL/L (ref 3.6–5.5)
PROT SERPL-MCNC: 7.6 G/DL (ref 6–8.2)
SODIUM SERPL-SCNC: 140 MMOL/L (ref 135–145)

## 2025-06-09 PROCEDURE — 36415 COLL VENOUS BLD VENIPUNCTURE: CPT

## 2025-06-09 PROCEDURE — 80053 COMPREHEN METABOLIC PANEL: CPT

## 2025-06-21 DIAGNOSIS — E06.3 HASHIMOTO'S DISEASE: ICD-10-CM

## 2025-06-23 RX ORDER — LEVOTHYROXINE SODIUM 25 UG/1
TABLET ORAL
Qty: 90 TABLET | Refills: 1 | Status: SHIPPED | OUTPATIENT
Start: 2025-06-23

## 2025-07-10 ENCOUNTER — HOSPITAL ENCOUNTER (OUTPATIENT)
Dept: LAB | Facility: MEDICAL CENTER | Age: 70
End: 2025-07-10
Attending: INTERNAL MEDICINE
Payer: MEDICARE

## 2025-07-11 ENCOUNTER — HOSPITAL ENCOUNTER (OUTPATIENT)
Facility: MEDICAL CENTER | Age: 70
End: 2025-07-11
Attending: INTERNAL MEDICINE
Payer: MEDICARE

## 2025-07-11 LAB — H PYLORI AG STL QL IA: DETECTED

## 2025-07-11 PROCEDURE — 87338 HPYLORI STOOL AG IA: CPT

## 2025-07-19 DIAGNOSIS — E78.5 DYSLIPIDEMIA: ICD-10-CM

## 2025-07-21 RX ORDER — ATORVASTATIN CALCIUM 20 MG/1
20 TABLET, FILM COATED ORAL DAILY
Qty: 90 TABLET | Refills: 3 | Status: SHIPPED | OUTPATIENT
Start: 2025-07-21

## 2025-07-21 NOTE — TELEPHONE ENCOUNTER
Received request via: Patient    Was the patient seen in the last year in this department? Yes    Does the patient have an active prescription (recently filled or refills available) for medication(s) requested? No    [facility]    Does the patient have senior living Plus and need 100-day supply? (This applies to ALL medications) Patient does not have SCP

## 2025-08-20 ENCOUNTER — APPOINTMENT (OUTPATIENT)
Dept: RADIOLOGY | Facility: MEDICAL CENTER | Age: 70
End: 2025-08-20
Attending: EMERGENCY MEDICINE
Payer: MEDICARE

## 2025-08-20 ENCOUNTER — HOSPITAL ENCOUNTER (EMERGENCY)
Facility: MEDICAL CENTER | Age: 70
End: 2025-08-20
Attending: EMERGENCY MEDICINE
Payer: MEDICARE

## 2025-08-20 VITALS
WEIGHT: 188.05 LBS | OXYGEN SATURATION: 95 % | SYSTOLIC BLOOD PRESSURE: 144 MMHG | DIASTOLIC BLOOD PRESSURE: 64 MMHG | RESPIRATION RATE: 16 BRPM | BODY MASS INDEX: 33.31 KG/M2 | HEART RATE: 66 BPM | TEMPERATURE: 98 F

## 2025-08-20 DIAGNOSIS — K29.00 ACUTE GASTRITIS WITHOUT HEMORRHAGE, UNSPECIFIED GASTRITIS TYPE: ICD-10-CM

## 2025-08-20 DIAGNOSIS — R07.9 ACUTE CHEST PAIN: Primary | ICD-10-CM

## 2025-08-20 DIAGNOSIS — M54.9 ACUTE BACK PAIN, UNSPECIFIED BACK LOCATION, UNSPECIFIED BACK PAIN LATERALITY: ICD-10-CM

## 2025-08-20 DIAGNOSIS — I10 HYPERTENSION, UNSPECIFIED TYPE: ICD-10-CM

## 2025-08-20 LAB
ALBUMIN SERPL BCP-MCNC: 3.9 G/DL (ref 3.2–4.9)
ALBUMIN/GLOB SERPL: 1.1 G/DL
ALP SERPL-CCNC: 133 U/L (ref 30–99)
ALT SERPL-CCNC: 6 U/L (ref 2–50)
ANION GAP SERPL CALC-SCNC: 12 MMOL/L (ref 7–16)
AST SERPL-CCNC: 32 U/L (ref 12–45)
BASOPHILS # BLD AUTO: 0.5 % (ref 0–1.8)
BASOPHILS # BLD: 0.05 K/UL (ref 0–0.12)
BILIRUB SERPL-MCNC: 0.7 MG/DL (ref 0.1–1.5)
BUN SERPL-MCNC: 15 MG/DL (ref 8–22)
CALCIUM ALBUM COR SERPL-MCNC: 9.2 MG/DL (ref 8.5–10.5)
CALCIUM SERPL-MCNC: 9.1 MG/DL (ref 8.5–10.5)
CHLORIDE SERPL-SCNC: 103 MMOL/L (ref 96–112)
CO2 SERPL-SCNC: 23 MMOL/L (ref 20–33)
CREAT SERPL-MCNC: 0.78 MG/DL (ref 0.5–1.4)
EKG IMPRESSION: NORMAL
EOSINOPHIL # BLD AUTO: 0.05 K/UL (ref 0–0.51)
EOSINOPHIL NFR BLD: 0.5 % (ref 0–6.9)
ERYTHROCYTE [DISTWIDTH] IN BLOOD BY AUTOMATED COUNT: 43.8 FL (ref 35.9–50)
GFR SERPLBLD CREATININE-BSD FMLA CKD-EPI: 82 ML/MIN/1.73 M 2
GLOBULIN SER CALC-MCNC: 3.6 G/DL (ref 1.9–3.5)
GLUCOSE SERPL-MCNC: 108 MG/DL (ref 65–99)
HCT VFR BLD AUTO: 40.1 % (ref 37–47)
HGB BLD-MCNC: 13.1 G/DL (ref 12–16)
IMM GRANULOCYTES # BLD AUTO: 0.04 K/UL (ref 0–0.11)
IMM GRANULOCYTES NFR BLD AUTO: 0.4 % (ref 0–0.9)
LIPASE SERPL-CCNC: 35 U/L (ref 11–82)
LYMPHOCYTES # BLD AUTO: 2.13 K/UL (ref 1–4.8)
LYMPHOCYTES NFR BLD: 21.3 % (ref 22–41)
MCH RBC QN AUTO: 28.7 PG (ref 27–33)
MCHC RBC AUTO-ENTMCNC: 32.7 G/DL (ref 32.2–35.5)
MCV RBC AUTO: 87.9 FL (ref 81.4–97.8)
MONOCYTES # BLD AUTO: 0.69 K/UL (ref 0–0.85)
MONOCYTES NFR BLD AUTO: 6.9 % (ref 0–13.4)
NEUTROPHILS # BLD AUTO: 7.06 K/UL (ref 1.82–7.42)
NEUTROPHILS NFR BLD: 70.4 % (ref 44–72)
NRBC # BLD AUTO: 0 K/UL
NRBC BLD-RTO: 0 /100 WBC (ref 0–0.2)
NT-PROBNP SERPL IA-MCNC: 55 PG/ML (ref 0–125)
PLATELET # BLD AUTO: 258 K/UL (ref 164–446)
PMV BLD AUTO: 10 FL (ref 9–12.9)
POTASSIUM SERPL-SCNC: 3.7 MMOL/L (ref 3.6–5.5)
PROT SERPL-MCNC: 7.5 G/DL (ref 6–8.2)
RBC # BLD AUTO: 4.56 M/UL (ref 4.2–5.4)
SODIUM SERPL-SCNC: 138 MMOL/L (ref 135–145)
TROPONIN T SERPL-MCNC: 7 NG/L (ref 6–19)
TROPONIN T SERPL-MCNC: <6 NG/L (ref 6–19)
WBC # BLD AUTO: 10 K/UL (ref 4.8–10.8)

## 2025-08-20 PROCEDURE — 83690 ASSAY OF LIPASE: CPT

## 2025-08-20 PROCEDURE — 700102 HCHG RX REV CODE 250 W/ 637 OVERRIDE(OP): Performed by: EMERGENCY MEDICINE

## 2025-08-20 PROCEDURE — 93005 ELECTROCARDIOGRAM TRACING: CPT | Mod: TC

## 2025-08-20 PROCEDURE — 99285 EMERGENCY DEPT VISIT HI MDM: CPT

## 2025-08-20 PROCEDURE — 93005 ELECTROCARDIOGRAM TRACING: CPT | Mod: TC | Performed by: EMERGENCY MEDICINE

## 2025-08-20 PROCEDURE — A9270 NON-COVERED ITEM OR SERVICE: HCPCS | Performed by: EMERGENCY MEDICINE

## 2025-08-20 PROCEDURE — 85025 COMPLETE CBC W/AUTO DIFF WBC: CPT

## 2025-08-20 PROCEDURE — 71045 X-RAY EXAM CHEST 1 VIEW: CPT

## 2025-08-20 PROCEDURE — 80053 COMPREHEN METABOLIC PANEL: CPT

## 2025-08-20 PROCEDURE — 83880 ASSAY OF NATRIURETIC PEPTIDE: CPT

## 2025-08-20 PROCEDURE — 36415 COLL VENOUS BLD VENIPUNCTURE: CPT

## 2025-08-20 PROCEDURE — 84484 ASSAY OF TROPONIN QUANT: CPT

## 2025-08-20 RX ORDER — METRONIDAZOLE 250 MG/1
250 TABLET ORAL EVERY 8 HOURS
COMMUNITY

## 2025-08-20 RX ORDER — URSODIOL 300 MG/1
1200 CAPSULE ORAL DAILY
COMMUNITY

## 2025-08-20 RX ORDER — TETRACYCLINE HYDROCHLORIDE 500 MG/1
500 CAPSULE ORAL 4 TIMES DAILY
COMMUNITY

## 2025-08-20 RX ORDER — OMEPRAZOLE 40 MG/1
40 CAPSULE, DELAYED RELEASE ORAL DAILY
COMMUNITY

## 2025-08-20 RX ORDER — ALUMINA, MAGNESIA, AND SIMETHICONE 2400; 2400; 240 MG/30ML; MG/30ML; MG/30ML
10 SUSPENSION ORAL 4 TIMES DAILY PRN
Qty: 560 ML | Refills: 0 | Status: SHIPPED | OUTPATIENT
Start: 2025-08-20 | End: 2025-09-03

## 2025-08-20 RX ORDER — ASPIRIN 325 MG
325 TABLET ORAL ONCE
Status: COMPLETED | OUTPATIENT
Start: 2025-08-20 | End: 2025-08-20

## 2025-08-20 RX ADMIN — LIDOCAINE HYDROCHLORIDE 30 ML: 20 SOLUTION ORAL; TOPICAL at 18:48

## 2025-08-20 RX ADMIN — NITROGLYCERIN 1 INCH: 20 OINTMENT TOPICAL at 18:46

## 2025-08-20 RX ADMIN — ASPIRIN 325 MG: 325 TABLET ORAL at 18:48

## 2025-08-20 ASSESSMENT — PAIN DESCRIPTION - PAIN TYPE: TYPE: ACUTE PAIN

## 2025-08-20 ASSESSMENT — FIBROSIS 4 INDEX: FIB4 SCORE: 2.41

## 2025-08-20 ASSESSMENT — HEART SCORE
HISTORY: SLIGHTLY SUSPICIOUS
HEART SCORE: 4
RISK FACTORS: >2 RISK FACTORS OR HX OF ATHEROSCLEROTIC DISEASE
TROPONIN: LESS THAN OR EQUAL TO NORMAL LIMIT
AGE: 65+
ECG: NORMAL

## (undated) DEVICE — GLOVE BIOGEL INDICATOR SZ 8 SURGICAL PF LTX - (50/BX 4BX/CA)

## (undated) DEVICE — SODIUM CHL IRRIGATION 0.9% 1000ML (12EA/CA)

## (undated) DEVICE — SLEEVE, VASO, THIGH, MED

## (undated) DEVICE — PACK NEURO - (2EA/CA)

## (undated) DEVICE — MASK ANESTHESIA ADULT  - (100/CA)

## (undated) DEVICE — HEADREST PRONEVIEW LARGE - (10/CA)

## (undated) DEVICE — FORCEPS IRRIGATING 8 X 1.5MM (5EA/BX)

## (undated) DEVICE — GLOVE SZ 7 BIOGEL PI MICRO - PF LF (50PR/BX 4BX/CA)

## (undated) DEVICE — ARMREST CRADLE FOAM - (2PR/PK 12PR/CA)

## (undated) DEVICE — SET EXTENSION WITH 2 PORTS (48EA/CA) ***PART #2C8610 IS A SUBSTITUTE*****

## (undated) DEVICE — GLOVE BIOGEL ECLIPSE PF LATEX SIZE 8.0  (50PR/BX)

## (undated) DEVICE — PROTECTOR ULNA NERVE - (36PR/CA)

## (undated) DEVICE — KIT EVACUATER 3 SPRING PVC LF 1/8 DRAIN SIZE (10EA/CA)"

## (undated) DEVICE — KIT SURGIFLO W/OUT THROMBIN - (6EA/CA)

## (undated) DEVICE — LACTATED RINGERS INJ. 500 ML - (24EA/CA)

## (undated) DEVICE — CHLORAPREP 26 ML APPLICATOR - ORANGE TINT(25/CA)

## (undated) DEVICE — SUTURE GENERAL

## (undated) DEVICE — TOWEL STOP TIMEOUT SAFETY FLAG (40EA/CA)

## (undated) DEVICE — MIDAS LUBRICATOR DIFFUSER PACK (4EA/CA)

## (undated) DEVICE — SUTURE 2-0 VICRYL PLUS CT-1 - 8 X 18 INCH(12/BX)

## (undated) DEVICE — GLOVE BIOGEL PI INDICATOR SZ 7.0 SURGICAL PF LF - (50/BX 4BX/CA)

## (undated) DEVICE — DRESSING TRANSPARENT FILM TEGADERM 2.375 X 2.75"  (100EA/BX)"

## (undated) DEVICE — SET LEADWIRE 5 LEAD BEDSIDE DISPOSABLE ECG (1SET OF 5/EA)

## (undated) DEVICE — SUCTION INSTRUMENT YANKAUER BULBOUS TIP W/O VENT (50EA/CA)

## (undated) DEVICE — BOVIE BLADE COATED &INSULATED - 25/PK

## (undated) DEVICE — SUTURE 4-0 MONOCRYL PLUS PS-1 - 27 INCH (36/BX)

## (undated) DEVICE — ELECTRODE 850 FOAM ADHESIVE - HYDROGEL RADIOTRNSPRNT (50/PK)

## (undated) DEVICE — GLOVE BIOGEL PI ORTHO SZ 6 SURGICAL PF LF (40PR/BX)

## (undated) DEVICE — CANISTER SUCTION 3000ML MECHANICAL FILTER AUTO SHUTOFF MEDI-VAC NONSTERILE LF DISP  (40EA/CA)

## (undated) DEVICE — SEALER BIPOLAR 2.3 AQUAMANTYS

## (undated) DEVICE — SENSOR SPO2 NEO LNCS ADHESIVE (20/BX) SEE USER NOTES

## (undated) DEVICE — DRAPE STRLE REG TOWEL 18X24 - (10/BX 4BX/CA)"

## (undated) DEVICE — ELECTRODE DUAL RETURN W/ CORD - (50/PK)

## (undated) DEVICE — TUBING CLEARLINK DUO-VENT - C-FLO (48EA/CA)

## (undated) DEVICE — GOWN SURGEONS X-LARGE - DISP. (30/CA)

## (undated) DEVICE — BLADE SURGICAL CLIPPER - (50EA/CA)

## (undated) DEVICE — KIT ANESTHESIA W/CIRCUIT & 3/LT BAG W/FILTER (20EA/CA)

## (undated) DEVICE — SYRINGE NON SAFETY 10 CC 20 GA X 1-1/2 IN (100/BX 4BX/CA)

## (undated) DEVICE — SUTURE 0 VICRYL PLUS CT-1 - 8 X 18 INCH (12/BX)

## (undated) DEVICE — GLOVE BIOGEL PI INDICATOR SZ 6.5 SURGICAL PF LF - (50/BX 4BX/CA)

## (undated) DEVICE — LACTATED RINGERS INJ 1000 ML - (14EA/CA 60CA/PF)

## (undated) DEVICE — NEPTUNE 4 PORT MANIFOLD - (20/PK)

## (undated) DEVICE — TUBING C&T SET FLYING LEADS DRAIN TUBING (10EA/BX)